# Patient Record
Sex: MALE | Race: WHITE | Employment: OTHER | URBAN - METROPOLITAN AREA
[De-identification: names, ages, dates, MRNs, and addresses within clinical notes are randomized per-mention and may not be internally consistent; named-entity substitution may affect disease eponyms.]

---

## 2017-03-10 RX ORDER — CELECOXIB 200 MG/1
CAPSULE ORAL
Qty: 90 CAP | Refills: 0 | Status: SHIPPED | OUTPATIENT
Start: 2017-03-10 | End: 2017-07-15 | Stop reason: SDUPTHER

## 2017-03-16 ENCOUNTER — TELEPHONE (OUTPATIENT)
Dept: INTERNAL MEDICINE CLINIC | Age: 72
End: 2017-03-16

## 2017-03-19 RX ORDER — HYDROCHLOROTHIAZIDE 25 MG/1
TABLET ORAL
Qty: 90 TAB | Refills: 1 | Status: SHIPPED | OUTPATIENT
Start: 2017-03-19 | End: 2017-12-03 | Stop reason: SDUPTHER

## 2017-07-16 RX ORDER — CELECOXIB 200 MG/1
CAPSULE ORAL
Qty: 90 CAP | Refills: 0 | Status: SHIPPED | OUTPATIENT
Start: 2017-07-16 | End: 2017-10-11 | Stop reason: SDUPTHER

## 2017-07-23 RX ORDER — ATORVASTATIN CALCIUM 10 MG/1
TABLET, FILM COATED ORAL
Qty: 90 TAB | Refills: 0 | Status: SHIPPED | OUTPATIENT
Start: 2017-07-23 | End: 2017-11-01 | Stop reason: SDUPTHER

## 2017-08-08 ENCOUNTER — OFFICE VISIT (OUTPATIENT)
Dept: INTERNAL MEDICINE CLINIC | Age: 72
End: 2017-08-08

## 2017-08-08 VITALS
HEIGHT: 73 IN | BODY MASS INDEX: 23.06 KG/M2 | HEART RATE: 76 BPM | WEIGHT: 174 LBS | TEMPERATURE: 98.6 F | SYSTOLIC BLOOD PRESSURE: 126 MMHG | OXYGEN SATURATION: 96 % | RESPIRATION RATE: 16 BRPM | DIASTOLIC BLOOD PRESSURE: 76 MMHG

## 2017-08-08 DIAGNOSIS — Z71.89 ACP (ADVANCE CARE PLANNING): ICD-10-CM

## 2017-08-08 DIAGNOSIS — Z96.652 H/O TOTAL KNEE REPLACEMENT, LEFT: ICD-10-CM

## 2017-08-08 DIAGNOSIS — E78.00 PURE HYPERCHOLESTEROLEMIA: ICD-10-CM

## 2017-08-08 DIAGNOSIS — Z13.39 SCREENING FOR ALCOHOLISM: ICD-10-CM

## 2017-08-08 DIAGNOSIS — M15.9 GENERALIZED OSTEOARTHRITIS OF MULTIPLE SITES: ICD-10-CM

## 2017-08-08 DIAGNOSIS — R73.01 IMPAIRED FASTING GLUCOSE: ICD-10-CM

## 2017-08-08 DIAGNOSIS — K64.9 HEMORRHOIDS, UNSPECIFIED HEMORRHOID TYPE: ICD-10-CM

## 2017-08-08 DIAGNOSIS — Z00.00 MEDICARE ANNUAL WELLNESS VISIT, SUBSEQUENT: Primary | ICD-10-CM

## 2017-08-08 DIAGNOSIS — I10 HYPERTENSION, ESSENTIAL: ICD-10-CM

## 2017-08-08 DIAGNOSIS — Z23 ENCOUNTER FOR IMMUNIZATION: ICD-10-CM

## 2017-08-08 RX ORDER — OXYCODONE AND ACETAMINOPHEN 5; 325 MG/1; MG/1
1 TABLET ORAL
Qty: 30 TAB | Refills: 0 | Status: SHIPPED | OUTPATIENT
Start: 2017-08-08 | End: 2018-08-10 | Stop reason: SDUPTHER

## 2017-08-08 RX ORDER — GUAIFENESIN 100 MG/5ML
81 LIQUID (ML) ORAL DAILY
COMMUNITY
End: 2019-08-20 | Stop reason: ALTCHOICE

## 2017-08-08 RX ORDER — AMOXICILLIN 500 MG/1
2000 CAPSULE ORAL ONCE
Qty: 4 CAP | Refills: 3 | Status: SHIPPED | OUTPATIENT
Start: 2017-08-08 | End: 2017-08-08

## 2017-08-08 NOTE — ACP (ADVANCE CARE PLANNING)
Advance Care Planning (ACP) Provider Note - Comprehensive     Date of ACP Conversation: 08/08/17  Persons included in Conversation:  patient      Authorized Decision Maker (if patient is incapable of making informed decisions): This person is:  Healthcare Agent/Medical Power of  under Advance Directive        General ACP for ALL Patients with Decision Making Capacity:  Importance of advance care planning, including choosing a healthcare agent to communicate patient's healthcare decisions if patient lost the ability to make decisions, such as after a sudden illness or accident  Understanding of the healthcare agent role was assessed and information provided  Opportunity offered to explore how cultural, Judaism, spiritual, or personal beliefs would affect decisions for future care  Exploration of values, goals, and preferences if recovery is not expected, even with continued medical treatment in the event of: Imminent death or severe, permanent brain injury     For Serious or Chronic Illness:  His medical problems were reviewed with them. Understanding of CPR, goals and expected outcomes, benefits and burdens discussed. Information on CPR success rates provided (e.g. for CPR in hospital, survival to d/c at two weeks is 22%, for chronically ill or elderly/frail survival is less than 3%); Individual asked to communicate understanding of information in his/her own words. Understanding of medical conditions    Interventions Provided:  Reviewed existing Advance Directive   Recommended communicating the plan and making copies for the healthcare agent, personal physician, and others as appropriate (e.g., health system)  Recommended review of completed ACP document annually or upon change in health status      He has an advanced directive - a copy has been provided & still reflects him wishes. Reviewed DNR/DNI and patient is not interested.

## 2017-08-08 NOTE — PATIENT INSTRUCTIONS
Medicare Wellness Visit, Male    The best way to live healthy is to have a healthy lifestyle by eating a well-balanced diet, exercising regularly, limiting alcohol and stopping smoking. Regular physical exams and screening tests are another way to keep healthy. Preventive exams provided by your health care provider can find health problems before they become diseases or illnesses. Preventive services including immunizations, screening tests, monitoring and exams can help you take care of your own health. All people over age 72 should have a pneumovax  and and a prevnar shot to prevent pneumonia. These are once in a lifetime unless you and your provider decide differently. All people over 65 should have a yearly flu shot and a tetanus vaccine every 10 years. Screening for diabetes mellitus with a blood sugar test should be done every year. Glaucoma is a disease of the eye due to increased ocular pressure that can lead to blindness and it should be done every year by an eye professional.    Cardiovascular screening tests that check for elevated lipids (fatty part of blood) which can lead to heart disease and strokes should be done every 5 years. Colorectal screening that evaluates for blood or polyps in your colon should be done yearly as a stool test or every five years as a flexible sigmoidoscope or every 10 years as a colonoscopy up to age 76. Men up to age 76 may need a screening blood test for prostate cancer at certain intervals, depending on their personal and family history. This decision is between the patient and his provider. If you have been a smoker or had family history of abdominal aortic aneurysms, you and your provider may decide to schedule an ultrasound test of your aorta. Hepatitis C screening is also recommended for anyone born between [birth year] through [address]. A shingles vaccine is also recommended once in a lifetime after age 61.     Your Medicare Wellness Exam is recommended annually. Here is a list of your current Health Maintenance items with a due date:  Health Maintenance Due   Topic Date Due    DTaP/Tdap/Td  (1 - Tdap) 02/15/1966    Shingles Vaccine  12/15/2004    Pneumococcal Vaccine (2 of 2 - PCV13) 11/05/2015    Colonoscopy  01/01/2016    Glaucoma Screening   10/21/2016    Annual Well Visit  12/11/2016    Flu Vaccine  08/01/2017            Advance Directives: Care Instructions  Your Care Instructions  An advance directive is a legal way to state your wishes at the end of your life. It tells your family and your doctor what to do if you can no longer say what you want. There are two main types of advance directives. You can change them any time that your wishes change. · A living will tells your family and your doctor your wishes about life support and other treatment. · A durable power of  for health care lets you name a person to make treatment decisions for you when you can't speak for yourself. This person is called a health care agent. If you do not have an advance directive, decisions about your medical care may be made by a doctor or a  who doesn't know you. It may help to think of an advance directive as a gift to the people who care for you. If you have one, they won't have to make tough decisions by themselves. Follow-up care is a key part of your treatment and safety. Be sure to make and go to all appointments, and call your doctor if you are having problems. It's also a good idea to know your test results and keep a list of the medicines you take. How can you care for yourself at home? · Discuss your wishes with your loved ones and your doctor. This way, there are no surprises. · Many states have a unique form. Or you might use a universal form that has been approved by many states. This kind of form can sometimes be completed and stored online.  Your electronic copy will then be available wherever you have a connection to the Internet. In most cases, doctors will respect your wishes even if you have a form from a different state. · You don't need a  to do an advance directive. But you may want to get legal advice. · Think about these questions when you prepare an advance directive:  ¨ Who do you want to make decisions about your medical care if you are not able to? Many people choose a family member or close friend. ¨ Do you know enough about life support methods that might be used? If not, talk to your doctor so you understand. ¨ What are you most afraid of that might happen? You might be afraid of having pain, losing your independence, or being kept alive by machines. ¨ Where would you prefer to die? Choices include your home, a hospital, or a nursing home. ¨ Would you like to have information about hospice care to support you and your family? ¨ Do you want to donate organs when you die? ¨ Do you want certain Denominational practices performed before you die? If so, put your wishes in the advance directive. · Read your advance directive every year, and make changes as needed. When should you call for help? Be sure to contact your doctor if you have any questions. Where can you learn more? Go to http://wally-payal.info/. Enter R264 in the search box to learn more about \"Advance Directives: Care Instructions. \"  Current as of: November 17, 2016  Content Version: 11.3  © 9476-3116 [employer]. Care instructions adapted under license by [employer] (which disclaims liability or warranty for this information). If you have questions about a medical condition or this instruction, always ask your healthcare professional. [address] any warranty or liability for your use of this information.

## 2017-08-08 NOTE — PROGRESS NOTES
Medicare wellness. Has a cyst on buttocks he would like you to look at. Requesting refill on Amoxicillin for dental procedures to have on hand.  Colonoscopy done at [facility], will request.

## 2017-08-08 NOTE — PROGRESS NOTES
Alex Tiwari is a 67 y.o. male who was seen in clinic today (8/8/2017) for a full physical.      Assessment & Plan:   Diagnoses and all orders for this visit:    1. Medicare annual wellness visit, subsequent    2. ACP (advance care planning)    3. Encounter for immunization  -     pneumococcal 13 kyle conj dip (PREVNAR-13) 0.5 mL syrg injection; 0.5 mL by IntraMUSCular route once for 1 dose.  -     diph,Pertuss,Acell,,Tet Vac-PF (ADACEL) 2 Lf-(2.5-5-3-5 mcg)-5Lf/0.5 mL susp; 0.5 mL by IntraMUSCular route once for 1 dose.  -     varicella zoster vacine live (VARICELLA-ZOSTER VACINE LIVE) 19,400 unit/0.65 mL susr injection; 1 Vial by SubCUTAneous route once for 1 dose. 4. Screening for alcoholism    5. Hypertension, essential- well controlled, continue current treatment pending review of labs   -     METABOLIC PANEL, COMPREHENSIVE  -     CBC W/O DIFF    6. Pure hypercholesterolemia- at goal, continue current treatment pending review of labs   -     METABOLIC PANEL, COMPREHENSIVE  -     LIPID PANEL    7. Impaired fasting glucose- due for labs, watch diet  -     METABOLIC PANEL, COMPREHENSIVE  -     HEMOGLOBIN A1C WITH EAG    8. Generalized osteoarthritis of multiple sites- this is a chronic problem that is stable. Per review of available records and patients , there are no sign of overuse, misuse, diversion, or concerning side effects. Today we reviewed: the risk of overdose, addiction, and dependency proper storage and disposal of medications the goals of treatment (improve functionality, quality of life, and pain) alternative treatment options including non-narcotic modalities the risks and benefits of continuing with a narcotic based pain regimen. Naloxone prescription is not indicated. The following changes were made to the treatment plan: nothing - medications refilled. [facility]  reviewed. Will defer UDS due to infrequent use & refills.   Will defer pain contact at this time, but will monitor usage closely via . -     oxyCODONE-acetaminophen (PERCOCET) 5-325 mg per tablet; Take 1 Tab by mouth daily as needed for Pain. 9. H/O total knee replacement, left  -     amoxicillin (AMOXIL) 500 mg capsule; Take 4 Caps by mouth once for 1 dose. 4 capsules prior to dermatology or dental appointments    10. Hemorrhoids, unspecified hemorrhoid type- declined exam, wants to talk to specialist, info provided. -     REFERRAL TO COLON AND RECTAL SURGERY         Follow-up Disposition:  Return in about 1 year (around 8/8/2018) for FULL PHYSICAL - 30 minutes. ------------------------------------------------------------------------------------------    Subjective: Annual Wellness Visit- Subsequent Visit    End of Life Planning: This was discussed with him today and he has an advanced directive - a copy has been provided. Reviewed DNR/DNI and patient is not interested. Depression Screen:   PHQ over the last two weeks 8/8/2017   Little interest or pleasure in doing things Not at all   Feeling down, depressed or hopeless Not at all   Total Score PHQ 2 0       Fall Risk:   Fall Risk Assessment, last 12 mths 8/8/2017   Able to walk? Yes   Fall in past 12 months? No       Abuse Screen:  Abuse Screening Questionnaire 8/8/2017   Do you ever feel afraid of your partner? N   Are you in a relationship with someone who physically or mentally threatens you? N   Is it safe for you to go home? Y       Alcohol Risk Factor Screening: On any occasion during the past 3 months, have you had more than 4 drinks containing alcohol? No  Do you average more than 14 drinks per week? No    Hearing Loss:  wears hearing aides    Activities of Daily Living:  Self-care. Requires assistance with: no ADLs  Exercise: very active    Cognition Screen:  appropriate for age attention/concentration and appropriate safety awareness    Adult Nutrition Screen:  No risk factors noted.   He has lost some weight due to changing his eating habits      Health Maintenance  Daily Aspirin: yes  AAA Screening: n/a (IPPE only)  Glaucoma Screening: Records requested      Immunizations:     Influenza: he will consider. Tetanus: not UTD- script provided. Shingles: not UTD - script provided. Pneumonia: due for Prevnar & script provided. Cancer screening:    Prostate: n/a, reviewed guidelines. Colon: UTD. No record available in the chart. Patient Care Team:  Xenia Agustin MD as PCP - General (Internal Medicine)  Mary Perez DO (Dermatology)  Tracy Mcdonald MD (Orthopedic Surgery)       In addition to the above issues we also reviewed the following acute and/or chronic problems:    Cardiovascular Review  The patient has hypertension and hyperlipidemia. Since last visit: no changes. He reports taking medications as instructed, no medication side effects noted, patient does not perform home BP monitoring. Diet and Lifestyle: generally follows a low fat low cholesterol diet, generally follows a low sodium diet, exercises regularly. Labs: reviewed and discussed with patient. Lab Results   Component Value Date/Time    Sodium 141 07/29/2016 03:20 AM    Potassium 3.6 07/29/2016 03:20 AM    Cholesterol, total 166 03/18/2016 10:50 AM    LDL, calculated 92 03/18/2016 10:50 AM    Triglyceride 106 03/18/2016 10:50 AM    INR 1.1 07/22/2016 09:24 AM        Chronic Pain  He presents do to generalized osteoarthritis that is effecting multiple joints. He describes the pain as aching. It is intermittent. He denies weakness, denies numbness, denies paresthesias. Exacerbating factors identifiable by patient are overuse. He has tried the following: NSAIDs and Oxycodone. These have been: effective. He is using Celebrex daily and narcotic prn (only at night).  reviewed: yes        The following sections were reviewed & updated as appropriate: PMH, PSH, FH, and SH.       Patient Active Problem List   Diagnosis Code    Hypertension, essential I10    Hyperlipidemia E78.5    Skin cancer C44.90    Pre-diabetes R73.03    Chronic lower back pain M54.5, G89.29    ACP (advance care planning) Z71.89    RBBB (right bundle branch block) I45.10    Mechanical loosening of internal left knee prosthetic joint ([employer] [facility] [address] T84.033A          Prior to Admission medications    Medication Sig Start Date End Date Taking? Authorizing Provider   aspirin 81 mg chewable tablet Take 81 mg by mouth daily. Yes Historical Provider   atorvastatin (LIPITOR) 10 mg tablet TAKE 1 TABLET BY MOUTH DAILY. 7/23/17  Yes Collette Beverage, MD   celecoxib (CELEBREX) 200 mg capsule TAKE 1 CAPSULE BY MOUTH DAILY 7/16/17  Yes Collette Beverage, MD   hydroCHLOROthiazide (HYDRODIURIL) 25 mg tablet TAKE 1 TABLET BY MOUTH EVERY DAY 3/19/17  Yes Collette Beverage, MD   amoxicillin (AMOXIL) 500 mg capsule Take 2,000 mg by mouth once. 4 capsules prior to dermatology or dental appointments 7/8/15  Yes Historical Provider   multivitamin (ONE A DAY) tablet Take 1 Tab by mouth daily. Yes Historical Provider          Allergies   Allergen Reactions    Acetaminophen Other (comments)     UNABLE TO URINATE, CAUSES FLANK PAIN              Review of Systems   Constitutional: Negative for chills and fever. Respiratory: Negative for cough and shortness of breath. Cardiovascular: Negative for chest pain and palpitations. Gastrointestinal: Negative for abdominal pain, blood in stool, constipation, diarrhea, heartburn, nausea and vomiting. Genitourinary:        He reports: nocturia x 3. He denies: urinary hesitancy, urinary frequency, weak stream.   Musculoskeletal: Positive for back pain (L sided lower back, only at night, sharp, no trauma, no radiation) and joint pain. Negative for myalgias. Neurological: Negative for tingling, focal weakness and headaches. Endo/Heme/Allergies: Does not bruise/bleed easily. Psychiatric/Behavioral: Negative for depression.  The patient is not nervous/anxious and does not have insomnia. Objective:   Physical Exam   Constitutional: No distress. HENT:   Mouth/Throat: Mucous membranes are normal.   Hearing aides bilaterally   Eyes: Conjunctivae are normal. No scleral icterus. Neck: Neck supple. Cardiovascular: Regular rhythm and normal heart sounds. No murmur heard. Pulmonary/Chest: Effort normal and breath sounds normal. He has no wheezes. He has no rales. Abdominal: Soft. Bowel sounds are normal. He exhibits no mass. There is no hepatosplenomegaly. There is no tenderness. Musculoskeletal: He exhibits no edema. Lumbar back: He exhibits normal range of motion, no tenderness, no bony tenderness, no pain and no spasm. Lymphadenopathy:     He has no cervical adenopathy. Skin: No rash noted. Psychiatric: He has a normal mood and affect. His behavior is normal.          Visit Vitals    /76    Pulse 76    Temp 98.6 °F (37 °C) (Oral)    Resp 16    Ht 6' 0.6\" (1.844 m)    Wt 174 lb (78.9 kg)    SpO2 96%    BMI 23.21 kg/m2          Advised him to call back or return to office if symptoms worsen/change/persist.  Discussed expected course/resolution/complications of diagnosis in detail with patient. Medication risks/benefits/costs/interactions/alternatives discussed with patient. He was given an after visit summary which includes diagnoses, current medications, & vitals. He expressed understanding with the diagnosis and plan.         Levander Canavan, MD

## 2017-08-08 NOTE — MR AVS SNAPSHOT
Visit Information Date & Time Provider Department Dept. Phone Encounter #  
 8/8/2017  9:00 AM Johnathan Wilson, [address] [facility] Internal Medicine [phone] [id number] Follow-up Instructions Return in about 1 year (around 8/8/2018) for FULL PHYSICAL - 30 minutes. Upcoming Health Maintenance Date Due DTaP/Tdap/Td series (1 - Tdap) 2/15/1966 ZOSTER VACCINE AGE 60> 12/15/2004 Pneumococcal 65+ High/Highest Risk (2 of 2 - PCV13) 11/5/2015 COLONOSCOPY 1/1/2016 GLAUCOMA SCREENING Q2Y 10/21/2016 MEDICARE YEARLY EXAM 12/11/2016 INFLUENZA AGE 9 TO ADULT 8/1/2017 Allergies as of 8/8/2017  Review Complete On: 8/8/2017 By: Ang Bonilla RN Severity Noted Reaction Type Reactions Acetaminophen  07/22/2016    Other (comments) UNABLE TO URINATE, CAUSES FLANK PAIN Current Immunizations  Reviewed on 8/8/2017 Name Date Pneumococcal Polysaccharide (PPSV-23) 11/5/2014 Reviewed by Ang Bonilla RN on 8/8/2017 at  9:02 AM  
You Were Diagnosed With   
  
 Codes Comments Medicare annual wellness visit, subsequent    -  Primary ICD-10-CM: Z00.00 ICD-9-CM: V70.0 ACP (advance care planning)     ICD-10-CM: Z71.89 ICD-9-CM: V65.49 Encounter for immunization     ICD-10-CM: I67 ICD-9-CM: V03.89 Screening for alcoholism     ICD-10-CM: Z13.89 ICD-9-CM: V79.1 Hypertension, essential     ICD-10-CM: I10 
ICD-9-CM: 401.9 Pure hypercholesterolemia     ICD-10-CM: E78.00 ICD-9-CM: 272.0 Impaired fasting glucose     ICD-10-CM: R73.01 
ICD-9-CM: 790.21 Generalized osteoarthritis of multiple sites     ICD-10-CM: M15.9 ICD-9-CM: 715.09   
 H/O total knee replacement, left     ICD-10-CM: H30.699 ICD-9-CM: V43.65 Hemorrhoids, unspecified hemorrhoid type     ICD-10-CM: K64.9 ICD-9-CM: 315. 6 Vitals BP Pulse Temp Resp Height(growth percentile) Weight(growth percentile) 126/76 76 98.6 °F (37 °C) (Oral) 16 6' 0.6\" (1.844 m) 174 lb (78.9 kg) SpO2 BMI Smoking Status 96% 23.21 kg/m2 Former Smoker BMI and BSA Data Body Mass Index Body Surface Area  
 23.21 kg/m 2 2.01 m 2 Preferred Pharmacy Pharmacy Name Phone [facility]/PHARMACY #[id number] [id number] [facility] [phone] Your Updated Medication List  
  
   
This list is accurate as of: 17  9:47 AM.  Always use your most recent med list.  
  
  
  
  
 amoxicillin 500 mg capsule Commonly known as:  AMOXIL Take 4 Caps by mouth once for 1 dose. 4 capsules prior to dermatology or dental appointments  
  
 aspirin 81 mg chewable tablet Take 81 mg by mouth daily. atorvastatin 10 mg tablet Commonly known as:  LIPITOR  
TAKE 1 TABLET BY MOUTH DAILY. celecoxib 200 mg capsule Commonly known as:  CELEBREX  
TAKE 1 CAPSULE BY MOUTH DAILY  
  
 diph,Pertuss(Acell),Tet Vac-PF 2 Lf-(2.5-5-3-5 mcg)-5Lf/0.5 mL susp Commonly known as:  ADACEL  
0.5 mL by IntraMUSCular route once for 1 dose.  
  
 hydroCHLOROthiazide 25 mg tablet Commonly known as:  HYDRODIURIL  
TAKE 1 TABLET BY MOUTH EVERY DAY  
  
 multivitamin tablet Commonly known as:  ONE A DAY Take 1 Tab by mouth daily. oxyCODONE-acetaminophen 5-325 mg per tablet Commonly known as:  PERCOCET Take 1 Tab by mouth daily as needed for Pain. pneumococcal 13 kyle conj dip 0.5 mL Syrg injection Commonly known as:  PREVNAR-13  
0.5 mL by IntraMUSCular route once for 1 dose. varicella zoster vacine live 19,400 unit/0.65 mL Susr injection Commonly known as:  varicella-zoster vacine live 1 Vial by SubCUTAneous route once for 1 dose. Prescriptions Printed Refills  
 pneumococcal 13 kyle conj dip (PREVNAR-13) 0.5 mL syrg injection 0 Si.5 mL by IntraMUSCular route once for 1 dose. Class: Print Route: IntraMUSCular diph,Pertuss,Acell,,Tet Vac-PF (ADACEL) 2 Lf-(2.5-5-3-5 mcg)-5Lf/0.5 mL susp 0 Si.5 mL by IntraMUSCular route once for 1 dose. Class: Print Route: IntraMUSCular  
 varicella zoster vacine live (VARICELLA-ZOSTER VACINE LIVE) 19,400 unit/0.65 mL susr injection 0 Si Vial by SubCUTAneous route once for 1 dose. Class: Print Route: SubCUTAneous  
 oxyCODONE-acetaminophen (PERCOCET) 5-325 mg per tablet 0 Sig: Take 1 Tab by mouth daily as needed for Pain. Class: Print Route: Oral  
  
Prescriptions Sent to Pharmacy Refills  
 amoxicillin (AMOXIL) 500 mg capsule 3 Sig: Take 4 Caps by mouth once for 1 dose. 4 capsules prior to dermatology or dental appointments Class: Normal  
 Pharmacy: [facility]/pharmacy #[id number], [address] [place] [address] Ph #: [phone] Route: Oral  
  
We Performed the Following CBC W/O DIFF [35137 CPT(R)] HEMOGLOBIN A1C WITH EAG [99549 CPT(R)] LIPID PANEL [15498 CPT(R)] METABOLIC PANEL, COMPREHENSIVE [60122 CPT(R)] REFERRAL TO COLON AND RECTAL SURGERY [REF17 Custom] Follow-up Instructions Return in about 1 year (around 2018) for FULL PHYSICAL - 30 minutes. Referral Information Referral ID Referred By Referred To  
  
 [id number] [facility] [address] Visits Status Start Date End Date 1 New Request 17 If your referral has a status of pending review or denied, additional information will be sent to support the outcome of this decision. Patient Instructions Medicare Wellness Visit, Male The best way to live healthy is to have a healthy lifestyle by eating a well-balanced diet, exercising regularly, limiting alcohol and stopping smoking. Regular physical exams and screening tests are another way to keep healthy. Preventive exams provided by your health care provider can find health problems before they become diseases or illnesses. Preventive services including immunizations, screening tests, monitoring and exams can help you take care of your own health. All people over age 72 should have a pneumovax  and and a prevnar shot to prevent pneumonia. These are once in a lifetime unless you and your provider decide differently. All people over 65 should have a yearly flu shot and a tetanus vaccine every 10 years. Screening for diabetes mellitus with a blood sugar test should be done every year. Glaucoma is a disease of the eye due to increased ocular pressure that can lead to blindness and it should be done every year by an eye professional. 
 
Cardiovascular screening tests that check for elevated lipids (fatty part of blood) which can lead to heart disease and strokes should be done every 5 years. Colorectal screening that evaluates for blood or polyps in your colon should be done yearly as a stool test or every five years as a flexible sigmoidoscope or every 10 years as a colonoscopy up to age 76. Men up to age 76 may need a screening blood test for prostate cancer at certain intervals, depending on their personal and family history. This decision is between the patient and his provider. If you have been a smoker or had family history of abdominal aortic aneurysms, you and your provider may decide to schedule an ultrasound test of your aorta. Hepatitis C screening is also recommended for anyone born between [birth year] through [address]. A shingles vaccine is also recommended once in a lifetime after age 61. Your Medicare Wellness Exam is recommended annually. Here is a list of your current Health Maintenance items with a due date: 
Health Maintenance Due Topic Date Due  
 DTaP/Tdap/Td  (1 - Tdap) 02/15/1966  Shingles Vaccine  12/15/2004  Pneumococcal Vaccine (2 of 2 - PCV13) 11/05/2015  Colonoscopy  01/01/2016  Glaucoma Screening   10/21/2016 [address] [place] [address] Annual Well Visit  12/11/2016  Flu Vaccine  08/01/2017 Advance Directives: Care Instructions Your Care Instructions An advance directive is a legal way to state your wishes at the end of your life. It tells your family and your doctor what to do if you can no longer say what you want. There are two main types of advance directives. You can change them any time that your wishes change. · A living will tells your family and your doctor your wishes about life support and other treatment. · A durable power of  for health care lets you name a person to make treatment decisions for you when you can't speak for yourself. This person is called a health care agent. If you do not have an advance directive, decisions about your medical care may be made by a doctor or a  who doesn't know you. It may help to think of an advance directive as a gift to the people who care for you. If you have one, they won't have to make tough decisions by themselves. Follow-up care is a key part of your treatment and safety. Be sure to make and go to all appointments, and call your doctor if you are having problems. It's also a good idea to know your test results and keep a list of the medicines you take. How can you care for yourself at home? · Discuss your wishes with your loved ones and your doctor. This way, there are no surprises. · Many states have a unique form. Or you might use a universal form that has been approved by many states. This kind of form can sometimes be completed and stored online. Your electronic copy will then be available wherever you have a connection to the Internet. In most cases, doctors will respect your wishes even if you have a form from a different state. · You don't need a  to do an advance directive. But you may want to get legal advice. · Think about these questions when you prepare an advance directive: ¨ Who do you want to make decisions about your medical care if you are not able to? Many people choose a family member or close friend. ¨ Do you know enough about life support methods that might be used? If not, talk to your doctor so you understand. ¨ What are you most afraid of that might happen? You might be afraid of having pain, losing your independence, or being kept alive by machines. ¨ Where would you prefer to die? Choices include your home, a hospital, or a nursing home. ¨ Would you like to have information about hospice care to support you and your family? ¨ Do you want to donate organs when you die? ¨ Do you want certain Quaker practices performed before you die? If so, put your wishes in the advance directive. · Read your advance directive every year, and make changes as needed. When should you call for help? Be sure to contact your doctor if you have any questions. Where can you learn more? Go to http://wally-payal.info/. Enter R264 in the search box to learn more about \"Advance Directives: Care Instructions. \" Current as of: November 17, 2016 Content Version: 11.3 © 4373-2717 [employer]. Care instructions adapted under license by [employer] (which disclaims liability or warranty for this information). If you have questions about a medical condition or this instruction, always ask your healthcare professional. [place] [address] any warranty or liability for your use of this information. Introducing [facility]! Dear Daniela Su: 
Thank you for requesting a Swipely account. Our records indicate that you already have an active Swipely account. You can access your account anytime at [email or web address] Did you know that you can access your hospital and ER discharge instructions at any time in WhenSoon? You can also review all of your test results from your hospital stay or ER visit. Additional Information If you have questions, please visit the Frequently Asked Questions section of the WhenSoon website at [email or web address] Remember, WhenSoon is NOT to be used for urgent needs. For medical emergencies, dial 911. Now available from your iPhone and Android! Please provide this summary of care documentation to your next provider. Your primary care clinician is listed as Xenia Agustin. If you have any questions after today's visit, please call [phone].

## 2017-08-18 ENCOUNTER — HOSPITAL ENCOUNTER (OUTPATIENT)
Dept: LAB | Age: 72
Discharge: HOME | End: 2017-08-18
Payer: MEDICARE

## 2017-08-18 PROCEDURE — 36415 COLL VENOUS BLD VENIPUNCTURE: CPT

## 2017-08-18 PROCEDURE — 80061 LIPID PANEL: CPT

## 2017-08-18 PROCEDURE — 85027 COMPLETE CBC AUTOMATED: CPT

## 2017-08-18 PROCEDURE — 80053 COMPREHEN METABOLIC PANEL: CPT

## 2017-08-18 PROCEDURE — 83036 HEMOGLOBIN GLYCOSYLATED A1C: CPT

## 2017-08-19 LAB
ALBUMIN SERPL-MCNC: 4.3 G/DL (ref 3.5–4.8)
ALBUMIN/GLOB SERPL: 1.7 {RATIO} (ref 1.2–2.2)
ALP SERPL-CCNC: 77 IU/L (ref 39–117)
ALT SERPL-CCNC: 13 IU/L (ref 0–44)
AST SERPL-CCNC: 19 IU/L (ref 0–40)
BILIRUB SERPL-MCNC: 0.7 MG/DL (ref 0–1.2)
BUN SERPL-MCNC: 20 MG/DL (ref 8–27)
BUN/CREAT SERPL: 20 (ref 10–24)
CALCIUM SERPL-MCNC: 9.4 MG/DL (ref 8.6–10.2)
CHLORIDE SERPL-SCNC: 99 MMOL/L (ref 96–106)
CHOLEST SERPL-MCNC: 179 MG/DL (ref 100–199)
CO2 SERPL-SCNC: 26 MMOL/L (ref 18–29)
CREAT SERPL-MCNC: 1 MG/DL (ref 0.76–1.27)
ERYTHROCYTE [DISTWIDTH] IN BLOOD BY AUTOMATED COUNT: 13.8 % (ref 12.3–15.4)
EST. AVERAGE GLUCOSE BLD GHB EST-MCNC: 126 MG/DL
GLOBULIN SER CALC-MCNC: 2.5 G/DL (ref 1.5–4.5)
GLUCOSE SERPL-MCNC: 106 MG/DL (ref 65–99)
HBA1C MFR BLD: 6 % (ref 4.8–5.6)
HCT VFR BLD AUTO: 46.4 % (ref 37.5–51)
HDLC SERPL-MCNC: 58 MG/DL
HGB BLD-MCNC: 15.8 G/DL (ref 12.6–17.7)
LDLC SERPL CALC-MCNC: 94 MG/DL (ref 0–99)
MCH RBC QN AUTO: 32 PG (ref 26.6–33)
MCHC RBC AUTO-ENTMCNC: 34.1 G/DL (ref 31.5–35.7)
MCV RBC AUTO: 94 FL (ref 79–97)
PLATELET # BLD AUTO: 258 X10E3/UL (ref 150–379)
POTASSIUM SERPL-SCNC: 3.7 MMOL/L (ref 3.5–5.2)
PROT SERPL-MCNC: 6.8 G/DL (ref 6–8.5)
RBC # BLD AUTO: 4.94 X10E6/UL (ref 4.14–5.8)
SODIUM SERPL-SCNC: 143 MMOL/L (ref 134–144)
TRIGL SERPL-MCNC: 134 MG/DL (ref 0–149)
VLDLC SERPL CALC-MCNC: 27 MG/DL (ref 5–40)
WBC # BLD AUTO: 8.5 X10E3/UL (ref 3.4–10.8)

## 2017-10-11 RX ORDER — CELECOXIB 200 MG/1
CAPSULE ORAL
Qty: 90 CAP | Refills: 2 | Status: SHIPPED | OUTPATIENT
Start: 2017-10-11 | End: 2018-07-22 | Stop reason: SDUPTHER

## 2017-11-01 RX ORDER — ATORVASTATIN CALCIUM 10 MG/1
TABLET, FILM COATED ORAL
Qty: 90 TAB | Refills: 3 | Status: SHIPPED | OUTPATIENT
Start: 2017-11-01 | End: 2018-10-18 | Stop reason: SDUPTHER

## 2017-12-03 RX ORDER — HYDROCHLOROTHIAZIDE 25 MG/1
TABLET ORAL
Qty: 90 TAB | Refills: 2 | Status: SHIPPED | OUTPATIENT
Start: 2017-12-03 | End: 2018-08-29 | Stop reason: SDUPTHER

## 2018-04-12 ENCOUNTER — TELEPHONE (OUTPATIENT)
Dept: INTERNAL MEDICINE CLINIC | Age: 73
End: 2018-04-12

## 2018-04-12 NOTE — TELEPHONE ENCOUNTER
Verified patient identity with two identifiers. Spoke with patient by phone, states this was given last fall at [facility].  Verified patient identity with two identifiers. Spoke with [facility] and verified date of TDAP immunization. Chart updated.

## 2018-07-13 ENCOUNTER — TELEPHONE (OUTPATIENT)
Dept: INTERNAL MEDICINE CLINIC | Age: 73
End: 2018-07-13

## 2018-07-13 NOTE — TELEPHONE ENCOUNTER
Pt's dentist called. Noticed on exam significant changes with bite in the last year. Nothing pathological at this time, but he did bring up the possibility of Padget. Pt has regular f/u next month and will address at that time.

## 2018-07-22 RX ORDER — CELECOXIB 200 MG/1
CAPSULE ORAL
Qty: 90 CAP | Refills: 1 | Status: SHIPPED | OUTPATIENT
Start: 2018-07-22 | End: 2019-01-15 | Stop reason: SDUPTHER

## 2018-08-10 ENCOUNTER — OFFICE VISIT (OUTPATIENT)
Dept: INTERNAL MEDICINE CLINIC | Age: 73
End: 2018-08-10

## 2018-08-10 VITALS
BODY MASS INDEX: 22.35 KG/M2 | HEART RATE: 76 BPM | WEIGHT: 168.6 LBS | TEMPERATURE: 98.3 F | OXYGEN SATURATION: 96 % | RESPIRATION RATE: 16 BRPM | HEIGHT: 73 IN | DIASTOLIC BLOOD PRESSURE: 78 MMHG | SYSTOLIC BLOOD PRESSURE: 108 MMHG

## 2018-08-10 DIAGNOSIS — Z13.39 SCREENING FOR ALCOHOLISM: ICD-10-CM

## 2018-08-10 DIAGNOSIS — Z13.31 SCREENING FOR DEPRESSION: ICD-10-CM

## 2018-08-10 DIAGNOSIS — Z23 ENCOUNTER FOR IMMUNIZATION: ICD-10-CM

## 2018-08-10 DIAGNOSIS — E78.00 PURE HYPERCHOLESTEROLEMIA: ICD-10-CM

## 2018-08-10 DIAGNOSIS — Z51.81 MEDICATION MONITORING ENCOUNTER: ICD-10-CM

## 2018-08-10 DIAGNOSIS — M15.9 PRIMARY OSTEOARTHRITIS INVOLVING MULTIPLE JOINTS: ICD-10-CM

## 2018-08-10 DIAGNOSIS — Z00.00 MEDICARE ANNUAL WELLNESS VISIT, SUBSEQUENT: Primary | ICD-10-CM

## 2018-08-10 DIAGNOSIS — L98.9 SKIN LESION: ICD-10-CM

## 2018-08-10 DIAGNOSIS — R63.4 WEIGHT LOSS, NON-INTENTIONAL: ICD-10-CM

## 2018-08-10 DIAGNOSIS — I10 HYPERTENSION, ESSENTIAL: ICD-10-CM

## 2018-08-10 DIAGNOSIS — Z12.5 PROSTATE CANCER SCREENING: ICD-10-CM

## 2018-08-10 DIAGNOSIS — R35.1 BENIGN PROSTATIC HYPERPLASIA WITH NOCTURIA: ICD-10-CM

## 2018-08-10 DIAGNOSIS — N40.1 BENIGN PROSTATIC HYPERPLASIA WITH NOCTURIA: ICD-10-CM

## 2018-08-10 DIAGNOSIS — Z71.89 ACP (ADVANCE CARE PLANNING): ICD-10-CM

## 2018-08-10 DIAGNOSIS — R73.01 IMPAIRED FASTING GLUCOSE: ICD-10-CM

## 2018-08-10 RX ORDER — TAMSULOSIN HYDROCHLORIDE 0.4 MG/1
0.4 CAPSULE ORAL DAILY
Qty: 30 CAP | Refills: 1 | Status: SHIPPED | OUTPATIENT
Start: 2018-08-10 | End: 2018-10-01 | Stop reason: SDUPTHER

## 2018-08-10 RX ORDER — OXYCODONE AND ACETAMINOPHEN 5; 325 MG/1; MG/1
1 TABLET ORAL
Qty: 28 TAB | Refills: 0 | Status: SHIPPED | OUTPATIENT
Start: 2018-08-10 | End: 2019-08-20

## 2018-08-10 NOTE — PROGRESS NOTES
Shy Donaldson is a 68 y.o. male who was seen in clinic today (8/10/2018) for a full physical.        Assessment & Plan:   Diagnoses and all orders for this visit:    1. Medicare annual wellness visit, subsequent    2. Encounter for immunization  -     varicella-zoster recombinant, PF, (SHINGRIX, PF,) 50 mcg/0.5 mL susr injection; 0.5 ml IM once and then repeat in 2-6 months. 3. ACP (advance care planning)    4. Screening for alcoholism  -     Annual  Alcohol Screen 15 min ()    5. Screening for depression  -     Depression Screen Annual    6. Pure hypercholesterolemia- well controlled, continue current treatment pending review of labs   -     CBC WITH AUTOMATED DIFF  -     LIPID PANEL    7. Hypertension, essential- at goal, continue current treatment pending review of labs   -     CBC WITH AUTOMATED DIFF    8. Impaired fasting glucose- due for labs, diet has been poor due to weight loss so curious to see what labs are  -     HEMOGLOBIN A1C WITH EAG    9. Primary osteoarthritis involving multiple joints- this is a chronic problem that is stable. Per review of available records and patients , there are no sign of overuse, misuse, diversion, or concerning side effects. Today we reviewed: the risk of overdose, addiction, and dependency proper storage and disposal of medications the goals of treatment (improve functionality, quality of life, and pain) alternative treatment options including non-narcotic modalities the risks and benefits of continuing with a narcotic based pain regimen. The following changes were made to the treatment plan: nothing - medications refilled. [facility]  reviewed and pain contract signed. We had a sujata and good discussion about concerns regarding regular use of narcotics. We discussed increasing Celebrex from daily to BID, but he is having some GI issues that maybe attributed to NSAIDs so will not make any changes. He is using meds sparingly (2-3/month).    - oxyCODONE-acetaminophen (PERCOCET) 5-325 mg per tablet; Take 1 Tab by mouth every four (4) hours as needed for Pain. Max Daily Amount: 6 Tabs. -     COMPLIANCE DRUG SCREEN/PRESCRIPTION MONITORING    10. Medication monitoring encounter  -     COMPLIANCE DRUG SCREEN/PRESCRIPTION MONITORING    11. Skin lesion- new dx, is concerning for skin cancer, he has f/u with dermatologist scheduled    12. Weight loss, non-intentional- this is a new problem, symptoms are: fluctuating, differential dx reviewed with the patient, exact etiology is unclear at this time. Initially sounded like it was due to shelter and diet changes (not having a set schedule so not eating regularly), however he has really ramped up his calorie intake with only slight increase in weight. Reviewed w/u. Due to his new BPH symptoms prostate cancer is always a possibility.     -     CBC WITH AUTOMATED DIFF  -     METABOLIC PANEL, COMPREHENSIVE    13. Benign prostatic hyperplasia with nocturia- new dx, new of the last year, reviewed BPH vs prostate ca, he is asking about Urolift procedure, will start w/ trying meds below first, reviewed expectations, and reviewed when to consider surgery. Will hold off on urology referral but if medication do not help will have him see specialist (regardless of PSA). -     tamsulosin (FLOMAX) 0.4 mg capsule; Take 1 Cap by mouth daily. 14. Prostate cancer screening  -     PSA SCREENING (SCREENING)         Follow-up Disposition:  Return in about 1 year (around 8/10/2019), or if symptoms worsen or fail to improve.        ------------------------------------------------------------------------------------------    Subjective: Annual Wellness Visit- Subsequent Visit    End of Life Planning: This was discussed with him today and he has an advanced directive - a copy has been provided. Reviewed DNR/DNI and patient is not interested.       Depression Screen:   PHQ over the last two weeks 8/10/2018   Little interest or pleasure in doing things Not at all   Feeling down, depressed, irritable, or hopeless Not at all   Total Score PHQ 2 0       Fall Risk:   Fall Risk Assessment, last 12 mths 8/10/2018   Able to walk? Yes   Fall in past 12 months? No       Abuse Screen:  Abuse Screening Questionnaire 8/10/2018   Do you ever feel afraid of your partner? N   Are you in a relationship with someone who physically or mentally threatens you? N   Is it safe for you to go home? Y         Alcohol Risk Factor Screening: On any occasion during the past 3 months, have you had more than 4 drinks containing alcohol? No  Do you average more than 14 drinks per week? No    Hearing Loss: The patient wears hearing aids. Cognition Screen:  Has your family/caregiver stated any concerns about your memory: no    Activities of Daily Living:    Requires assistance with: no ADLs  Home contains: handrails and grab bars  Exercise: very active    Adult Nutrition Screen:  Unplanned weight loss (5 pts)      Health Maintenance  Daily Aspirin: yes  AAA Screening: n/a (IPPE only)  Glaucoma Screening: UTD      Immunizations:     Influenza: he will plan on getting it this fall. Tetanus: up to date. Shingles: due for Shingrix - script provided. Pneumonia: up to date. Cancer screening:    Prostate: reviewed guidelines, n/a. Colon: guidelines reviewed, UTD. Patient Care Team:  Yajaira Miranda MD as PCP - General (Internal Medicine)  Falguni Carmona DO (Dermatology)  Valdemar Wharton MD (Orthopedic Surgery)  Marivel Tanner MD (Optometry)       In addition to the above issues we also reviewed the following acute and/or chronic problems:    Cardiovascular Review  The patient has hypertension and hyperlipidemia. Since last visit: weight has decreased. He reports taking medications as instructed, no medication side effects noted, patient does not perform home BP monitoring.   Diet and Lifestyle: not attempting to follow a low fat, low cholesterol diet, not attempting to follow a low sodium diet, exercises regularly. Labs: reviewed and discussed with patient. Chronic Pain (> 3 months)  He RTC today to discuss his osteoarthritis that is affecting all joints. Significant changes since last visit: none. He reports his symptoms are stable. He is able to do his normal daily activities. He reports using Celebrex 1 tab daily w/ good relief. Least pain over the last week has been 0/10. Worst pain over the last week has been 7/10. He reports the following adverse side effects: none. Pill count is consistent with her prescription: did not bring in, he is out    Aberrant behaviors: none.  reviewed: yes  Concomitant use of a benzodiazepine: no  Personal or family history of psychiatric, addiction, or substance abuse: no  Urine Drug Screen: due - order placed. Pain agreement on file: not on file - completed today             The following sections were reviewed & updated as appropriate: PMH, PSH, FH, and SH. Patient Active Problem List   Diagnosis Code    Hypertension, essential I10    Hyperlipidemia E78.5    Skin cancer C44.90    Pre-diabetes R73.03    Chronic lower back pain M54.5, G89.29    RBBB (right bundle branch block) I45.10          Prior to Admission medications    Medication Sig Start Date End Date Taking? Authorizing Provider   celecoxib (CELEBREX) 200 mg capsule TAKE ONE CAPSULE BY MOUTH EVERY DAY 7/22/18  Yes Susan Flores MD   hydroCHLOROthiazide (HYDRODIURIL) 25 mg tablet TAKE 1 TABLET BY MOUTH EVERY DAY 12/3/17  Yes Susan Flores MD   atorvastatin (LIPITOR) 10 mg tablet TAKE 1 TABLET BY MOUTH EVERY DAY 11/1/17  Yes Susan Flores MD   aspirin 81 mg chewable tablet Take 81 mg by mouth daily. Yes Historical Provider   oxyCODONE-acetaminophen (PERCOCET) 5-325 mg per tablet Take 1 Tab by mouth daily as needed for Pain.  8/8/17  Yes Susan Flores MD   multivitamin (ONE A DAY) tablet Take 1 Tab by mouth daily. Yes Historical Provider          Allergies   Allergen Reactions    Acetaminophen Other (comments)     UNABLE TO URINATE, CAUSES FLANK PAIN              Review of Systems   Constitutional: Positive for weight loss (He reports retiring and weight decreased to the low 160's and has started to pay attention to diet and weight is up to 168, he has increased calorie intake signifcantly, no obvious reason for the decrease. , he reports no appetite). Negative for chills and fever. Respiratory: Negative for cough and shortness of breath. Cardiovascular: Negative for chest pain and palpitations. Gastrointestinal: Negative for abdominal pain, blood in stool, constipation, diarrhea, heartburn, nausea and vomiting. He reports increase in belching, chronic   Genitourinary:        He reports: nocturia x 3 (new in the last 1-2 yrs), urinary hesitancy, urinary frequency. He denies: weak stream.       Denies trouble getting or maintaining an erection. Denies trouble with AM erections. Musculoskeletal: Positive for joint pain. Negative for myalgias. Neurological: Negative for tingling, focal weakness and headaches. Endo/Heme/Allergies: Does not bruise/bleed easily. Psychiatric/Behavioral: Negative for depression. The patient is not nervous/anxious and does not have insomnia. Objective:   Physical Exam   Eyes: Conjunctivae are normal. No scleral icterus. Neck: No thyromegaly present. Cardiovascular: Regular rhythm and normal heart sounds. No murmur heard. Pulses:       Dorsalis pedis pulses are 2+ on the right side, and 2+ on the left side. Posterior tibial pulses are 2+ on the right side, and 2+ on the left side. Pulmonary/Chest: Effort normal and breath sounds normal. He has no wheezes. He has no rales. Abdominal: Soft. Bowel sounds are normal. He exhibits no mass. There is no hepatosplenomegaly. There is no tenderness. Musculoskeletal: He exhibits no edema. Lymphadenopathy:     He has no cervical adenopathy. Skin:   R shoulder there is a 1.5cm lesion, erythematous ring and raised brown/black   Psychiatric: He has a normal mood and affect. His behavior is normal.          Visit Vitals    /78    Pulse 76    Temp 98.3 °F (36.8 °C) (Oral)    Resp 16    Ht 6' 0.5\" (1.842 m)    Wt 168 lb 9.6 oz (76.5 kg)    SpO2 96%    BMI 22.55 kg/m2          Advised him to call back or return to office if symptoms worsen/change/persist.  Discussed expected course/resolution/complications of diagnosis in detail with patient. Medication risks/benefits/costs/interactions/alternatives discussed with patient. He was given an after visit summary which includes diagnoses, current medications, & vitals. He expressed understanding with the diagnosis and plan. Aspects of this note may have been generated using voice recognition software. Despite editing, there may be some syntax errors.        Ian Li MD

## 2018-08-10 NOTE — PATIENT INSTRUCTIONS
Medicare Wellness Visit, Male    The best way to live healthy is to have a lifestyle where you eat a well-balanced diet, exercise regularly, limit alcohol use, and quit all forms of tobacco/nicotine, if applicable. Regular preventive services are another way to keep healthy. Preventive services (vaccines, screening tests, monitoring & exams) can help personalize your care plan, which helps you manage your own care. Screening tests can find health problems at the earliest stages, when they are easiest to treat. [facility] follows the current, evidence-based guidelines published by the [facility] ([facility]) when recommending preventive services for our patients. Because we follow these guidelines, sometimes recommendations change over time as research supports it. (For example, a prostate screening blood test is no longer routinely recommended for men with no symptoms.)    Of course, you and your provider may decide to screen more often for some diseases, based on your risk and co-morbidities (chronic disease you are already diagnosed with). Preventive services for you include:    - Medicare offers their members a free annual wellness visit, which is time for you and your primary care provider to discuss and plan for your preventive service needs. Take advantage of this benefit every year!    -All people over age 72 should receive the recommended pneumonia vaccines. Current USPSTF guidelines recommend a series of two vaccines for the best pneumonia protection.     -All adults should have a yearly flu vaccine and a tetanus vaccine every 10 years.  All adults age 61 years should receive a shingles vaccine once in their lifetime.      -All adults age 38-68 years who are overweight should have a diabetes screening test once every three years.     -Other screening tests & preventive services for persons with diabetes include: an eye exam to screen for diabetic retinopathy, a kidney function test, a foot exam, and stricter control over your cholesterol.     -Cardiovascular screening for adults with routine risk involves an electrocardiogram (ECG) at intervals determined by the provider.     -Colorectal cancer screenings should be done for adults age 54-65 years with normal risk. There are a number of acceptable methods of screening for this type of cancer. Each test has its own benefits and drawbacks. Discuss with your provider what is most appropriate for you during your annual wellness visit. The different tests include: colonoscopy (considered the best screening method), a fecal occult blood test, a fecal DNA test, and sigmoidoscopy.    -All adults born between [facility] should be screened once for Hepatitis C.    -An Abdominal Aortic Aneurysm (AAA) Screening is recommended for men age 73-68 who has ever smoked in their lifetime. Here is a list of your current Health Maintenance items (your personalized list of preventive services) with a due date:  Health Maintenance Due   Topic Date Due    Flu Vaccine  08/01/2018    Annual Well Visit  08/09/2018          Learning About Darryl Eisenberg  What is a living will? A living will is a legal form you use to write down the kind of care you want at the end of your life. It is used by the health professionals who will treat you if you aren't able to decide for yourself. If you put your wishes in writing, your loved ones and others will know what kind of care you want. They won't need to guess. This can ease your mind and be helpful to others. A living will is not the same as an estate or property will. An estate will explains what you want to happen with your money and property after you die. Is a living will a legal document? A living will is a legal document. Each state has its own laws about living junior. If you move to another state, make sure that your living will is legal in the state where you now live.  Or you might use a universal form that has been approved by many states. This kind of form can sometimes be completed and stored online. Your electronic copy will then be available wherever you have a connection to the Internet. In most cases, doctors will respect your wishes even if you have a form from a different state. · You don't need an  to complete a living will. But legal advice can be helpful if your state's laws are unclear, your health history is complicated, or your family can't agree on what should be in your living will. · You can change your living will at any time. Some people find that their wishes about end-of-life care change as their health changes. · In addition to making a living will, think about completing a medical power of  form. This form lets you name the person you want to make end-of-life treatment decisions for you (your \"health care agent\") if you're not able to. Many hospitals and nursing homes will give you the forms you need to complete a living will and a medical power of . · Your living will is used only if you can't make or communicate decisions for yourself anymore. If you become able to make decisions again, you can accept or refuse any treatment, no matter what you wrote in your living will. · Your state may offer an online registry. This is a place where you can store your living will online so the doctors and nurses who need to treat you can find it right away. What should you think about when creating a living will? Talk about your end-of-life wishes with your family members and your doctor. Let them know what you want. That way the people making decisions for you won't be surprised by your choices. Think about these questions as you make your living will:  · Do you know enough about life support methods that might be used?  If not, talk to your doctor so you know what might be done if you can't breathe on your own, your heart stops, or you're unable to swallow. · What things would you still want to be able to do after you receive life-support methods? Would you want to be able to walk? To speak? To eat on your own? To live without the help of machines? · If you have a choice, where do you want to be cared for? In your home? At a hospital or nursing home? · Do you want certain Adventist practices performed if you become very ill? · If you have a choice at the end of your life, where would you prefer to die? At home? In a hospital or nursing home? Somewhere else? · Would you prefer to be buried or cremated? · Do you want your organs to be donated after you die? What should you do with your living will? · Make sure that your family members and your health care agent have copies of your living will. · Give your doctor a copy of your living will to keep in your medical record. If you have more than one doctor, make sure that each one has a copy. · You may want to put a copy of your living will where it can be easily found. Where can you learn more? Go to http://wallyHardMetricspayal.info/. Enter O396 in the search box to learn more about \"Learning About Living [employer] [employer]. \"  Current as of: August 8, 2016  Content Version: 11.3  © 4317-3254 [employer]. Care instructions adapted under license by [employer] (which disclaims liability or warranty for this information). If you have questions about a medical condition or this instruction, always ask your healthcare professional. [place] [address] any warranty or liability for your use of this information. Abnormal Weight Loss: Care Instructions  Your Care Instructions    There are two types of weight loss-normal and abnormal. The normal kind happens when you are trying to lose weight by exercising more or eating less. The abnormal kind happens when you are not trying to lose weight. Many medical problems can cause abnormal weight loss.  These include problems with your thyroid gland, long-term infections, mouth or throat problems that make it hard to eat, and digestive problems. They also include depression and cancer. Some medicines also may cause you to lose weight. You can work with your doctor to find the cause of your weight loss. You will probably need tests to do this. Follow-up care is a key part of your treatment and safety. Be sure to make and go to all appointments, and call your doctor if you are having problems. It's also a good idea to know your test results and keep a list of the medicines you take. How can you care for yourself at home? · Weigh yourself at the same time every day. It's best to do it first thing in the morning after you empty your bladder. Be sure to always wear the same amount of clothing. · Write down any changes in your weight and the possible causes. Discuss these with your doctor. · Your doctor may want you to change your diet. Do your best to follow his or her advice. · Ask your doctor if you should see a dietitian. This is a person who can help you plan meals that work best for your lifestyle. · Note any changes in bowel habits. These may include changes in how often you have a bowel movement. Other changes include the color and size of your stools and how solid they are. · If you are prescribed medicines, take them exactly as prescribed. Call your doctor if you think you are having a problem with your medicine. You will get more details on the specific medicines your doctor prescribes. When should you call for help? Watch closely for changes in your health, and be sure to contact your doctor if:    · You do not get better as expected.     · You continue to lose weight. Where can you learn more? Go to http://wally-payal.info/. Enter A790 in the search box to learn more about \"Abnormal Weight Loss: Care Instructions. \"  Current as of: October 9, 2017  Content Version: 11.7  © 3067-1746 [employer]. Care instructions adapted under license by [employer] (which disclaims liability or warranty for this information). If you have questions about a medical condition or this instruction, always ask your healthcare professional. [place] [address] any warranty or liability for your use of this information.

## 2018-08-10 NOTE — ACP (ADVANCE CARE PLANNING)
Advance Care Planning    Advance Care Planning (ACP) Provider Note - Comprehensive     Date of ACP Conversation: 08/10/18  Persons included in Conversation:  patient  Length of ACP Conversation in minutes: <16 minutes (Non-Billable)    Authorized Decision Maker (if patient is incapable of making informed decisions): This person is: Healthcare Agent/Medical Power of  under Advance Directive      He has an advanced directive - a copy has been provided & still reflects him wishes. Reviewed DNR/DNI and patient is not interested. General ACP for ALL Patients with Decision Making Capacity:  Importance of advance care planning, including choosing a healthcare agent to communicate patient's healthcare decisions if patient lost the ability to make decisions, such as after a sudden illness or accident  Understanding of the healthcare agent role was assessed and information provided  Opportunity offered to explore how cultural, Mormonism, spiritual, or personal beliefs would affect decisions for future care  Exploration of values, goals, and preferences if recovery is not expected, even with continued medical treatment in the event of: Imminent death or severe, permanent brain injury    For Serious or Chronic Illness:  Understanding of CPR, goals and expected outcomes, benefits and burdens discussed.   Understanding of medical condition  Information on CPR success rates provided (e.g. for CPR in hospital, survival to d/c at two weeks is 22%, for chronically ill or elderly/frail survival is less than 3%)    Interventions Provided:  Recommended communicating the plan and making copies for the healthcare agent, personal physician, and others as appropriate (e.g., health system)  Recommended review of completed ACP document annually or upon change in health status

## 2018-08-10 NOTE — LETTER
Name:Nataly Nieves AXP:2[birth year]2 MR #:[id number] Provider Name:Pravin Burr MD  
*[id number]* BSMG-491 (5/16) Page 1 of 5 Initial [employer] CONTROLLED SUBSTANCE AGREEMENT I may be prescribed medications that are controlled substances as part  of my treatment plan for management of my medical condition(s). The goal of my treatment plan is to maintain and/or improve my health and wellbeing. Because controlled substances have an increased risk of abuse or harm, continual re-evaluation is needed determine if the goals of my treatment plan are being met for my safety and the safety of others. Amos Briones  am entering into this Controlled Substance Agreement with my provider, Luis Dennis MD at [address] [place] [address] . I understand that successful treatment requires mutual trust and honesty between me and my provider. I understand that there are state and federal laws and regulations which apply to the medications that my provider may prescribe that must be followed. I understand there are risks and benefits ts of taking the medicines that my provider may prescribe. I understand and agree that following this Agreement is necessary in continuing my provider-patient relationship and success of my treatment plan. As a part of my treatment plan, I agree to the following: COMMUNICATION: 
 
1. I will communicate fully with my provider about my medical condition(s), including the effect on my daily life and how well my medications are helping. I will tell my provider all of the medications that I take for any reason, including medications I receive from another health care provider, and will notify my provider about all issues, problems or concerns, including any side effects, which may be related to my medications. I understand that this information allows my provider to adjust my treatment plan to help manage my medical condition.  I understand that this information will become part of my permanent medical record. 2. I will notify my provider if I have a history of alcohol/drug misuse/addiction or if I have had treatment for alcohol/drug addiction in the past, or if I have a new problem with or concern about alcohol/drug use/addiction, because this increases the likelihood of high risk behaviors and may lead to serious medical conditions. 3. Females Only: I will notify my provider if I am or become pregnant, or if I intend to become pregnant, or if I intend to breastfeed. I understand that communication of these issues with my provider is important, due to possible effects my medication could have on an unborn fetus or breastfeeding child. Name:.Nitin Melendrez BF MR #:[id number] Provider Name:Pravin Schaefer MD  
*CZGC-[id number]* BSMG-491 () Page 2 of 5 Initial [employer] MISUSE OF MEDICATIONS / DRUGS: 
 
1. I agree to take all controlled substances as prescribed, and will not misuse or abuse any controlled substances prescribed by my provider. For my safety, I will not increase the amount of medicine I take without first talking with and getting permission from my provider. 2. If I have a medical emergency, another health care provider may prescribe me medication. If I seek emergency treatment, I will notify my provider within seventy-two (72) hours. 3. I understand that my provider may discuss my use and/or possible misuse/abuse of controlled substances and alcohol, as appropriate, with any health care provider involved in my care, pharmacist or legal authority. ILLEGAL DRUGS: 
 
1. I will not use illegal drugs of any kind, including but not limited to marijuana, heroin, cocaine, or any prescription drug which is not prescribed to me. DRUG DIVERSION / PRESCRIPTION FRAUD: 
 
1. I will not share, sell, trade, give away, or otherwise misuse my prescriptions or medications. 2. I will not alter any prescriptions provided to me by my provider. SINGLE PROVIDER: 
 
1. I agree that all controlled substances that I take will be prescribed only by my provider (or his/her covering provider) under this Agreement. This agreement does not prevent me from seeking emergency medical treatment or receiving pain management related to a surgery. PROTECTING MEDICATIONS: 
 
1. I am responsible for keeping my prescriptions and medications in a safe and secure place including safeguarding them from loss or theft. I understand that lost, stolen or damaged/destroyed prescriptions or medications will not be replaced. Name:.Nitin Melendrez ABP:[birth year] MR #:[id number] Provider Name:Pravin Schaefer MD  
*[id number]* BSMG-491 (5/16) Page 3 of 5 Initial EPIS PRESCRIPTION RENEWALS/REFILLS: 
 
1. I will follow my controlled substance medication schedule as prescribed by my provider. 2. I understand and agree that I will make any requests for renewals or refills of my prescriptions only at the time of an office visit or during my providers regular office hours subject to the prescription refill requirements of the individual practice. 3. I understand that my provider may not call in prescriptions for controlled substances to my pharmacy. 4. I understand that my provider may adjust or discontinue these medications as deemed appropriate for my medical treatment plan. This Agreement does not guarantee the prescription of controlled medications. 5. I agree that if my medications are adjusted or discontinued, I will properly dispose of any remaining medications. I understand that I will be required to dispose of any remaining controlled medications prior to being provided with any prescriptions for other controlled medications.  
 
 
1. I authorize my provider and my pharmacy to cooperate fully with any local, state, or federal law enforcement agency in the investigation of any possible misuse, sale, or other diversion of my controlled substance prescriptions or medications. RISKS: 
 
 
1. I understand that if I do not adhere to this Agreement in any way, my provider may change my prescriptions, stop prescribing controlled substances or end our provider-patient relationship. 2. If my provider decides to stop prescribing medication, or decides to end our provider-patient relationship,my provider may require that I taper my medications slowly. If necessary, my provider may also provide a prescription for other medications to treat my withdrawal symptoms. UNDERSTANDING THIS AGREEMENT: 
 
I understand that my provider may adjust or stop my prescriptions for controlled substances based on my medical condition and my treatment plan. I understand that this Agreement does not guarantee that I will be prescribed medications or controlled substances. I understand that controlled substances may be just one part 
of my treatment plan. My initial on each page and my signature below shows that I have read each page of this Agreement, I have had an opportunity to ask questions, and all of my questions have been answered to my satisfaction by my provider. By signing below, I agree to comply with this Agreement, and I understand that if I do not follow the Agreements listed above, my provider may stop 
 
 
 
_________________________________________  Date/Time 8/10/2018 11:01 AM   
             (Patient Signature)

## 2018-08-18 LAB
ALBUMIN SERPL-MCNC: 4.4 G/DL (ref 3.5–4.8)
ALBUMIN/GLOB SERPL: 1.8 {RATIO} (ref 1.2–2.2)
ALP SERPL-CCNC: 76 IU/L (ref 39–117)
ALT SERPL-CCNC: 17 IU/L (ref 0–44)
AST SERPL-CCNC: 22 IU/L (ref 0–40)
BASOPHILS # BLD AUTO: 0 X10E3/UL (ref 0–0.2)
BASOPHILS NFR BLD AUTO: 0 %
BILIRUB SERPL-MCNC: 0.7 MG/DL (ref 0–1.2)
BUN SERPL-MCNC: 30 MG/DL (ref 8–27)
BUN/CREAT SERPL: 30 (ref 10–24)
CALCIUM SERPL-MCNC: 9.4 MG/DL (ref 8.6–10.2)
CHLORIDE SERPL-SCNC: 98 MMOL/L (ref 96–106)
CHOLEST SERPL-MCNC: 163 MG/DL (ref 100–199)
CO2 SERPL-SCNC: 26 MMOL/L (ref 20–29)
CREAT SERPL-MCNC: 1.01 MG/DL (ref 0.76–1.27)
EOSINOPHIL # BLD AUTO: 0.2 X10E3/UL (ref 0–0.4)
EOSINOPHIL NFR BLD AUTO: 2 %
ERYTHROCYTE [DISTWIDTH] IN BLOOD BY AUTOMATED COUNT: 13.4 % (ref 12.3–15.4)
EST. AVERAGE GLUCOSE BLD GHB EST-MCNC: 126 MG/DL
GLOBULIN SER CALC-MCNC: 2.5 G/DL (ref 1.5–4.5)
GLUCOSE SERPL-MCNC: 109 MG/DL (ref 65–99)
HBA1C MFR BLD: 6 % (ref 4.8–5.6)
HCT VFR BLD AUTO: 44.4 % (ref 37.5–51)
HDLC SERPL-MCNC: 63 MG/DL
HGB BLD-MCNC: 15.4 G/DL (ref 13–17.7)
IMM GRANULOCYTES # BLD: 0 X10E3/UL (ref 0–0.1)
IMM GRANULOCYTES NFR BLD: 0 %
LDLC SERPL CALC-MCNC: 82 MG/DL (ref 0–99)
LYMPHOCYTES # BLD AUTO: 1.9 X10E3/UL (ref 0.7–3.1)
LYMPHOCYTES NFR BLD AUTO: 17 %
MCH RBC QN AUTO: 31.7 PG (ref 26.6–33)
MCHC RBC AUTO-ENTMCNC: 34.7 G/DL (ref 31.5–35.7)
MCV RBC AUTO: 91 FL (ref 79–97)
MONOCYTES # BLD AUTO: 1 X10E3/UL (ref 0.1–0.9)
MONOCYTES NFR BLD AUTO: 9 %
NEUTROPHILS # BLD AUTO: 7.9 X10E3/UL (ref 1.4–7)
NEUTROPHILS NFR BLD AUTO: 72 %
PLATELET # BLD AUTO: 244 X10E3/UL (ref 150–379)
POTASSIUM SERPL-SCNC: 3.3 MMOL/L (ref 3.5–5.2)
PROT SERPL-MCNC: 6.9 G/DL (ref 6–8.5)
PSA SERPL-MCNC: 2.2 NG/ML (ref 0–4)
RBC # BLD AUTO: 4.86 X10E6/UL (ref 4.14–5.8)
SODIUM SERPL-SCNC: 143 MMOL/L (ref 134–144)
TRIGL SERPL-MCNC: 91 MG/DL (ref 0–149)
VLDLC SERPL CALC-MCNC: 18 MG/DL (ref 5–40)
WBC # BLD AUTO: 11.2 X10E3/UL (ref 3.4–10.8)

## 2018-08-20 NOTE — PROGRESS NOTES
Results released to patient via [employer]. All labs are stable or at goal for him except for a few subtle abnormalities. WBC just barely high, will monitor. Low K, first time, will monitor. A1c and FBS stable. PSA acceptable.

## 2018-08-22 ENCOUNTER — HOSPITAL ENCOUNTER (OUTPATIENT)
Dept: LAB | Age: 73
Discharge: HOME | End: 2018-08-22
Payer: MEDICARE

## 2018-08-22 PROCEDURE — 82570 ASSAY OF URINE CREATININE: CPT

## 2018-08-28 LAB — DRUGS UR: NORMAL

## 2018-08-28 NOTE — PROGRESS NOTES
Results released to patient via [facility]. MIKA [employer] + for THC. Will not be able to refill narcotics.

## 2018-08-29 RX ORDER — HYDROCHLOROTHIAZIDE 25 MG/1
TABLET ORAL
Qty: 90 TAB | Refills: 3 | Status: SHIPPED | OUTPATIENT
Start: 2018-08-29 | End: 2018-11-21 | Stop reason: SDUPTHER

## 2018-10-01 DIAGNOSIS — N40.1 BENIGN PROSTATIC HYPERPLASIA WITH NOCTURIA: ICD-10-CM

## 2018-10-01 DIAGNOSIS — R35.1 BENIGN PROSTATIC HYPERPLASIA WITH NOCTURIA: ICD-10-CM

## 2018-10-01 RX ORDER — TAMSULOSIN HYDROCHLORIDE 0.4 MG/1
0.4 CAPSULE ORAL DAILY
Qty: 90 CAP | Refills: 1 | Status: SHIPPED | OUTPATIENT
Start: 2018-10-01 | End: 2019-03-29 | Stop reason: SDUPTHER

## 2018-10-10 ENCOUNTER — OFFICE VISIT (OUTPATIENT)
Dept: INTERNAL MEDICINE CLINIC | Age: 73
End: 2018-10-10

## 2018-10-10 VITALS
HEART RATE: 96 BPM | HEIGHT: 73 IN | SYSTOLIC BLOOD PRESSURE: 116 MMHG | WEIGHT: 169.3 LBS | OXYGEN SATURATION: 96 % | BODY MASS INDEX: 22.44 KG/M2 | TEMPERATURE: 97.9 F | RESPIRATION RATE: 16 BRPM | DIASTOLIC BLOOD PRESSURE: 78 MMHG

## 2018-10-10 DIAGNOSIS — M54.50 CHRONIC MIDLINE LOW BACK PAIN WITHOUT SCIATICA: ICD-10-CM

## 2018-10-10 DIAGNOSIS — R35.1 BENIGN PROSTATIC HYPERPLASIA WITH NOCTURIA: ICD-10-CM

## 2018-10-10 DIAGNOSIS — R14.2 BELCHING SYMPTOM: ICD-10-CM

## 2018-10-10 DIAGNOSIS — Z23 ENCOUNTER FOR IMMUNIZATION: ICD-10-CM

## 2018-10-10 DIAGNOSIS — G89.29 CHRONIC MIDLINE LOW BACK PAIN WITHOUT SCIATICA: ICD-10-CM

## 2018-10-10 DIAGNOSIS — N40.1 BENIGN PROSTATIC HYPERPLASIA WITH NOCTURIA: ICD-10-CM

## 2018-10-10 DIAGNOSIS — R63.4 WEIGHT LOSS, UNINTENTIONAL: Primary | ICD-10-CM

## 2018-10-10 NOTE — PROGRESS NOTES
Had some weight loss. Wants to discuss letter from dentist. Flomax helping. A number of things he would like to discuss. William Sandoval is a 68 y.o. male  who present for routine immunizations. he  denies any symptoms , reactions or allergies that would exclude them from being immunized today. Risks and adverse reactions were discussed and the VIS was given to them. All questions were addressed. he was observed for 5 min post injection. There were no reactions observed.     Aamir Shepherd RN

## 2018-10-10 NOTE — PROGRESS NOTES
Doyle Wong is a 68 y.o. male who was seen in clinic today (10/10/2018). Assessment & Plan:   Diagnoses and all orders for this visit:    1. Weight loss, unintentional- this is a chronic problem, symptoms are: worsened, differential dx reviewed with the patient, and favor due to poor PO intake. Reviewed nothing to suggest cancer or medications. Reviewed ways to increase PO intake, encouraged 3 meals per day, and can continue shakes/supplements. Encouraged to weight himself weekly and update me in 1 month. Recent labs reviewed and w/o any specific etiology but several lab abnormalities, likely not of a clinical significance. 2. Benign prostatic hyperplasia with nocturia- improved on medications, reviewed expectations, no changes. 3. Chronic midline low back pain without sciatica- stable, intermittent, reviewed + UDS and pt is okay with not getting any further refills of pain medications, he is aware that he can get medications for emergencies/surgeries just not chronic prescription. 4. Belching symptom- this is a chronic problem but new to me, symptoms are: stable, differential dx reviewed with the patient, exact etiology is unclear at this time. Reviewed trial of PPI/H2B to see if GERD related and he will try this. Did review probiotics and monitoring foods (lactose). Red flags were reviewed with the patient to RTC or notify me. 5. Encounter for immunization  -     INFLUENZA VACCINE INACTIVATED (IIV), SUBUNIT, ADJUVANTED, IM  -     ADMIN INFLUENZA VIRUS VAC         Follow-up Disposition:  Return if symptoms worsen or fail to improve. Subjective: Cyn Gonzales was seen today for Weight Loss    He RTC to today to talk about weight loss. He reports he is not a \"great eater\". Only eating 1-2 meals per day. They don't sit down for family meal.  He reports weight was down to 160 at home. He made a conscious effort to increase PO intake. He is having 1-2 shakes per day.    We reviewed his chart and he has lost 20+ lbs loss in 3-4 yrs. Colonoscopy in '16 was normal.  PSA level was 2.2 a few months ago. We discussed his abnormal UDS. He reports using marijuana on a regular basis. He reports it helps his mood. Has not had any side effect. He is asking about what this means. We wanted to discuss the conversation I had with his dentist.  Dentist raised the concerns about Pagets. He is not having any pain. He was asking about an OTC medication: Niacinamide. Brief Labs:     Lab Results   Component Value Date/Time    Sodium 143 08/17/2018 09:24 AM    Potassium 3.3 08/17/2018 09:24 AM    Creatinine 1.01 08/17/2018 09:24 AM    Cholesterol, total 163 08/17/2018 09:24 AM    HDL Cholesterol 63 08/17/2018 09:24 AM    LDL, calculated 82 08/17/2018 09:24 AM    Triglyceride 91 08/17/2018 09:24 AM    Hemoglobin A1c 6.0 08/17/2018 09:24 AM          Prior to Admission medications    Medication Sig Start Date End Date Taking? Authorizing Provider   tamsulosin (FLOMAX) 0.4 mg capsule Take 1 Cap by mouth daily. 10/1/18  Yes Zabrina Bell MD   hydroCHLOROthiazide (HYDRODIURIL) 25 mg tablet TAKE 1 TABLET BY MOUTH EVERY DAY 8/29/18  Yes Zabrina Bell MD   oxyCODONE-acetaminophen (PERCOCET) 5-325 mg per tablet Take 1 Tab by mouth every four (4) hours as needed for Pain. Max Daily Amount: 6 Tabs. 8/10/18  Yes Zabrina Bell MD   celecoxib (CELEBREX) 200 mg capsule TAKE ONE CAPSULE BY MOUTH EVERY DAY 7/22/18  Yes Zabrina Bell MD   atorvastatin (LIPITOR) 10 mg tablet TAKE 1 TABLET BY MOUTH EVERY DAY 11/1/17  Yes Zabrina Bell MD   aspirin 81 mg chewable tablet Take 81 mg by mouth daily. Yes Historical Provider   multivitamin (ONE A DAY) tablet Take 1 Tab by mouth daily.    Yes Historical Provider          Allergies   Allergen Reactions    Acetaminophen Other (comments)     UNABLE TO URINATE, CAUSES FLANK PAIN           Review of Systems   Constitutional: Positive for weight loss. Negative for chills, fever and malaise/fatigue. Respiratory: Negative for cough and shortness of breath. Cardiovascular: Negative for chest pain, palpitations and leg swelling. Gastrointestinal: Negative for abdominal pain, constipation, diarrhea, heartburn, nausea and vomiting. He reports increase in belching   Genitourinary: Negative for frequency, hematuria and urgency. Skin: Negative for rash. Neurological: Negative for tingling, sensory change, focal weakness and headaches. Objective:   Physical Exam   Constitutional: No distress. Eyes: Conjunctivae are normal. No scleral icterus. Cardiovascular: Regular rhythm and normal heart sounds. No murmur heard. Pulmonary/Chest: Effort normal and breath sounds normal. He has no wheezes. He has no rales. Abdominal: Soft. Bowel sounds are normal. He exhibits no mass. There is no hepatosplenomegaly. There is no tenderness. Lymphadenopathy:     He has no cervical adenopathy. Psychiatric: He has a normal mood and affect. His behavior is normal.         Visit Vitals    /78    Pulse 96    Temp 97.9 °F (36.6 °C) (Oral)    Resp 16    Ht 6' 0.5\" (1.842 m)    Wt 169 lb 4.8 oz (76.8 kg)    SpO2 96%    BMI 22.65 kg/m2         Disclaimer:  Advised him to call back or return to office if symptoms worsen/change/persist.  Discussed expected course/resolution/complications of diagnosis in detail with patient. Medication risks/benefits/costs/interactions/alternatives discussed with patient. He was given an after visit summary which includes diagnoses, current medications, & vitals. He expressed understanding with the diagnosis and plan. Aspects of this note may have been generated using voice recognition software. Despite editing, there may be some syntax errors.        Mitch Cardona MD

## 2018-10-18 RX ORDER — ATORVASTATIN CALCIUM 10 MG/1
TABLET, FILM COATED ORAL
Qty: 90 TAB | Refills: 3 | Status: SHIPPED | OUTPATIENT
Start: 2018-10-18 | End: 2019-10-05 | Stop reason: SDUPTHER

## 2018-11-21 NOTE — TELEPHONE ENCOUNTER
Last OV: 10-10-18  Next visit: 8-13-19  Last labs: 8-17-18    Refill request for HCTZ forwarded to provider for approval.

## 2018-11-22 RX ORDER — HYDROCHLOROTHIAZIDE 25 MG/1
TABLET ORAL
Qty: 90 TAB | Refills: 1 | Status: SHIPPED | OUTPATIENT
Start: 2018-11-22 | End: 2019-12-30

## 2019-01-15 RX ORDER — CELECOXIB 200 MG/1
CAPSULE ORAL
Qty: 90 CAP | Refills: 1 | Status: SHIPPED | OUTPATIENT
Start: 2019-01-15 | End: 2019-08-06 | Stop reason: SDUPTHER

## 2019-03-14 PROBLEM — K64.1 GRADE II HEMORRHOIDS: Status: ACTIVE | Noted: 2019-03-14

## 2019-03-29 DIAGNOSIS — N40.1 BENIGN PROSTATIC HYPERPLASIA WITH NOCTURIA: ICD-10-CM

## 2019-03-29 DIAGNOSIS — R35.1 BENIGN PROSTATIC HYPERPLASIA WITH NOCTURIA: ICD-10-CM

## 2019-03-29 RX ORDER — TAMSULOSIN HYDROCHLORIDE 0.4 MG/1
CAPSULE ORAL
Qty: 90 CAP | Refills: 1 | Status: SHIPPED | OUTPATIENT
Start: 2019-03-29 | End: 2020-09-15

## 2019-08-06 RX ORDER — CELECOXIB 200 MG/1
CAPSULE ORAL
Qty: 30 CAP | Refills: 5 | Status: SHIPPED | OUTPATIENT
Start: 2019-08-06 | End: 2019-12-03 | Stop reason: SDUPTHER

## 2019-08-20 ENCOUNTER — OFFICE VISIT (OUTPATIENT)
Dept: INTERNAL MEDICINE CLINIC | Age: 74
End: 2019-08-20

## 2019-08-20 VITALS
TEMPERATURE: 98.7 F | BODY MASS INDEX: 22.08 KG/M2 | SYSTOLIC BLOOD PRESSURE: 102 MMHG | DIASTOLIC BLOOD PRESSURE: 68 MMHG | HEART RATE: 76 BPM | RESPIRATION RATE: 16 BRPM | WEIGHT: 163 LBS | OXYGEN SATURATION: 97 % | HEIGHT: 72 IN

## 2019-08-20 DIAGNOSIS — N40.1 BENIGN PROSTATIC HYPERPLASIA WITH NOCTURIA: ICD-10-CM

## 2019-08-20 DIAGNOSIS — R73.03 PRE-DIABETES: ICD-10-CM

## 2019-08-20 DIAGNOSIS — Z71.89 ADVANCED CARE PLANNING/COUNSELING DISCUSSION: ICD-10-CM

## 2019-08-20 DIAGNOSIS — Z00.00 MEDICARE ANNUAL WELLNESS VISIT, SUBSEQUENT: Primary | ICD-10-CM

## 2019-08-20 DIAGNOSIS — Z12.5 PROSTATE CANCER SCREENING: ICD-10-CM

## 2019-08-20 DIAGNOSIS — E78.00 PURE HYPERCHOLESTEROLEMIA: ICD-10-CM

## 2019-08-20 DIAGNOSIS — M15.9 PRIMARY OSTEOARTHRITIS INVOLVING MULTIPLE JOINTS: ICD-10-CM

## 2019-08-20 DIAGNOSIS — Z13.6 SCREENING FOR CARDIOVASCULAR CONDITION: ICD-10-CM

## 2019-08-20 DIAGNOSIS — Z13.31 SCREENING FOR DEPRESSION: ICD-10-CM

## 2019-08-20 DIAGNOSIS — R63.4 WEIGHT LOSS, UNINTENTIONAL: ICD-10-CM

## 2019-08-20 DIAGNOSIS — Z23 ENCOUNTER FOR IMMUNIZATION: ICD-10-CM

## 2019-08-20 DIAGNOSIS — Z13.39 SCREENING FOR ALCOHOLISM: ICD-10-CM

## 2019-08-20 DIAGNOSIS — I10 HYPERTENSION, ESSENTIAL: ICD-10-CM

## 2019-08-20 DIAGNOSIS — R35.1 BENIGN PROSTATIC HYPERPLASIA WITH NOCTURIA: ICD-10-CM

## 2019-08-20 RX ORDER — AMOXICILLIN 500 MG/1
CAPSULE ORAL
Refills: 3 | COMMUNITY
Start: 2019-08-01

## 2019-08-20 NOTE — ACP (ADVANCE CARE PLANNING)
Advance Care Planning (ACP) Provider Note - Comprehensive     Date of ACP Conversation: 08/20/19  Persons included in Conversation:  patient  Length of ACP Conversation in minutes: <16 minutes (Non-Billable)    Authorized Decision Maker (if patient is incapable of making informed decisions):   Healthcare Agent/Medical Power of  under Advance Directive      He has an advanced directive - a copy has been provided & still reflects him wishes. Reviewed DNR/DNI and patient is not interested. General ACP for ALL Patients with Decision Making Capacity:  Importance of advance care planning, including choosing a healthcare agent to communicate patient's healthcare decisions if patient lost the ability to make decisions, such as after a sudden illness or accident  Understanding of the healthcare agent role was assessed and information provided  Opportunity offered to explore how cultural, Alevism, spiritual, or personal beliefs would affect decisions for future care  Exploration of values, goals, and preferences if recovery is not expected, even with continued medical treatment in the event of: Imminent death or severe, permanent brain injury    For Serious or Chronic Illness:  Understanding of CPR, goals and expected outcomes, benefits and burdens discussed.   Understanding of medical condition  Information on CPR success rates provided (e.g. for CPR in hospital, survival to d/c at two weeks is 22%, for chronically ill or elderly/frail survival is less than 3%)    Interventions Provided:  Recommended communicating the plan and making copies for the healthcare agent, personal physician, and others as appropriate (e.g., health system)  Recommended review of completed ACP document annually or upon change in health status

## 2019-08-20 NOTE — PATIENT INSTRUCTIONS
Medicare Wellness Visit, Male    The best way to live healthy is to have a lifestyle where you eat a well-balanced diet, exercise regularly, limit alcohol use, and quit all forms of tobacco/nicotine, if applicable. Regular preventive services are another way to keep healthy. Preventive services (vaccines, screening tests, monitoring & exams) can help personalize your care plan, which helps you manage your own care. Screening tests can find health problems at the earliest stages, when they are easiest to treat. [facility] follows the current, evidence-based guidelines published by the [facility] ([facility]) when recommending preventive services for our patients. Because we follow these guidelines, sometimes recommendations change over time as research supports it. (For example, a prostate screening blood test is no longer routinely recommended for men with no symptoms.)  Of course, you and your doctor may decide to screen more often for some diseases, based on your risk and co-morbidities (chronic disease you are already diagnosed with). Preventive services for you include:  - Medicare offers their members a free annual wellness visit, which is time for you and your primary care provider to discuss and plan for your preventive service needs. Take advantage of this benefit every year!  -All adults over age 72 should receive the recommended pneumonia vaccines. Current USPSTF guidelines recommend a series of two vaccines for the best pneumonia protection.   -All adults should have a flu vaccine yearly and an ECG.  All adults age 61 and older should receive a shingles vaccine once in their lifetime.    -All adults age 38-68 who are overweight should have a diabetes screening test once every three years.   -Other screening tests & preventive services for persons with diabetes include: an eye exam to screen for diabetic retinopathy, a kidney function test, a foot exam, and stricter control over your cholesterol.   -Cardiovascular screening for adults with routine risk involves an electrocardiogram (ECG) at intervals determined by the provider.   -Colorectal cancer screening should be done for adults age 54-65 with no increased risk factors for colorectal cancer. There are a number of acceptable methods of screening for this type of cancer. Each test has its own benefits and drawbacks. Discuss with your provider what is most appropriate for you during your annual wellness visit. The different tests include: colonoscopy (considered the best screening method), a fecal occult blood test, a fecal DNA test, and sigmoidoscopy.  -All adults born between [facility] should be screened once for Hepatitis C.  -An Abdominal Aortic Aneurysm (AAA) Screening is recommended for men age 73-68 who has ever smoked in their lifetime. Here is a list of your current Health Maintenance items (your personalized list of preventive services) with a due date:  Health Maintenance Due   Topic Date Due    Shingles Vaccine (1 of 2) 02/15/1995    AAA Screening  02/15/2010    Glaucoma Screening   07/28/2019    Flu Vaccine  08/01/2019    Annual Well Visit  08/11/2019          Learning About Living Felicia  What is a living will? A living will is a legal form you use to write down the kind of care you want at the end of your life. It is used by the health professionals who will treat you if you aren't able to decide for yourself. If you put your wishes in writing, your loved ones and others will know what kind of care you want. They won't need to guess. This can ease your mind and be helpful to others. A living will is not the same as an estate or property will. An estate will explains what you want to happen with your money and property after you die. Is a living will a legal document? A living will is a legal document. Each state has its own laws about living junior.  If you move to another state, make sure that your living will is legal in the state where you now live. Or you might use a universal form that has been approved by many states. This kind of form can sometimes be completed and stored online. Your electronic copy will then be available wherever you have a connection to the Internet. In most cases, doctors will respect your wishes even if you have a form from a different state. · You don't need an  to complete a living will. But legal advice can be helpful if your state's laws are unclear, your health history is complicated, or your family can't agree on what should be in your living will. · You can change your living will at any time. Some people find that their wishes about end-of-life care change as their health changes. · In addition to making a living will, think about completing a medical power of  form. This form lets you name the person you want to make end-of-life treatment decisions for you (your \"health care agent\") if you're not able to. Many hospitals and nursing homes will give you the forms you need to complete a living will and a medical power of . · Your living will is used only if you can't make or communicate decisions for yourself anymore. If you become able to make decisions again, you can accept or refuse any treatment, no matter what you wrote in your living will. · Your state may offer an online registry. This is a place where you can store your living will online so the doctors and nurses who need to treat you can find it right away. What should you think about when creating a living will? Talk about your end-of-life wishes with your family members and your doctor. Let them know what you want. That way the people making decisions for you won't be surprised by your choices. Think about these questions as you make your living will:  · Do you know enough about life support methods that might be used?  If not, talk to your doctor so you know what might be done if you can't breathe on your own, your heart stops, or you're unable to swallow. · What things would you still want to be able to do after you receive life-support methods? Would you want to be able to walk? To speak? To eat on your own? To live without the help of machines? · If you have a choice, where do you want to be cared for? In your home? At a hospital or nursing home? · Do you want certain Anabaptism practices performed if you become very ill? · If you have a choice at the end of your life, where would you prefer to die? At home? In a hospital or nursing home? Somewhere else? · Would you prefer to be buried or cremated? · Do you want your organs to be donated after you die? What should you do with your living will? · Make sure that your family members and your health care agent have copies of your living will. · Give your doctor a copy of your living will to keep in your medical record. If you have more than one doctor, make sure that each one has a copy. · You may want to put a copy of your living will where it can be easily found. Where can you learn more? Go to http://wally-payla.info/. Enter D871 in the search box to learn more about \"Learning About Living Irena Perez. \"  Current as of: August 8, 2016  Content Version: 11.3  © 7453-2648 [employer]. Care instructions adapted under license by [employer] (which disclaims liability or warranty for this information). If you have questions about a medical condition or this instruction, always ask your healthcare professional. [address] any warranty or liability for your use of this information.

## 2019-08-20 NOTE — PROGRESS NOTES
Zahida Roberson is a 76 y.o. male who was seen in clinic today (8/20/2019) for a full physical.          Assessment & Plan:   Diagnoses and all orders for this visit:    1. Medicare annual wellness visit, subsequent    2. Advanced care planning/counseling discussion    3. Encounter for immunization    4. Screening for alcoholism  -     UT ANNUAL ALCOHOL SCREEN 15 MIN    5. Screening for depression  -     DEPRESSION SCREEN ANNUAL    6. Prostate cancer screening  -     PSA SCREENING (SCREENING)    7. Screening for cardiovascular condition  -     US EXAM SCREENING AAA; Future    8. Weight loss, unintentional- chronic issue, overall stable the last year, feel this is life style related (sleeping 8-10 hrs and only eating 1-2 meals/day). Will repeat labs and check CXR. Reassured this has been present for a few years and w/o any new symptoms do not think there is anything pathologic.  -     METABOLIC PANEL, COMPREHENSIVE  -     CBC W/O DIFF  -     XR CHEST PA LAT; Future    9. Hypertension, essential- well controlled, low today, continue current treatment pending review of labs   -     METABOLIC PANEL, COMPREHENSIVE    10. Pure hypercholesterolemia- at goal, continue current treatment pending review of labs   -     METABOLIC PANEL, COMPREHENSIVE  -     LIPID PANEL    11. Benign prostatic hyperplasia with nocturia- well controlled off meds, okay to stay off meds    12. Primary osteoarthritis involving multiple joints- chronic, diffuse, using meds daily w/ okay relief, reviewed can increase to BID as needed, continue to f/u with surgeon prn. 13. Pre-diabetes- due for labs, will continue to monitor, due to weight loss do not restrict diet. -     METABOLIC PANEL, COMPREHENSIVE  -     HEMOGLOBIN A1C WITH EAG         Follow-up and Dispositions    · Return in about 6 months (around 2/20/2020), or if symptoms worsen or fail to improve, for Regular follow up. ------------------------------------------------------------------------------------------    Subjective: Annual Wellness Visit- Subsequent Visit    End of Life Planning: This was discussed with him today and he has an advanced directive - a copy has been provided. Reviewed DNR/DNI and patient is not interested. Depression Screen:   3 most recent PHQ Screens 8/20/2019   Little interest or pleasure in doing things Not at all   Feeling down, depressed, irritable, or hopeless Not at all   Total Score PHQ 2 0       Fall Risk:   Fall Risk Assessment, last 12 mths 8/20/2019   Able to walk? Yes   Fall in past 12 months? No       Abuse Screen:  Abuse Screening Questionnaire 8/20/2019   Do you ever feel afraid of your partner? N   Are you in a relationship with someone who physically or mentally threatens you? N   Is it safe for you to go home? Y         Alcohol Risk Factor Screening: On any occasion during the past 3 months, have you had more than 4 drinks containing alcohol? No  Do you average more than 14 drinks per week? No    Hearing Loss: Hearing is good, wears hearing aides.     Cognition Screen:  Has your family/caregiver stated any concerns about your memory: no  Cognition: appears appropriate for age attention/concentration and appears appropriate safety awareness    Activities of Daily Living:    Requires assistance with: no ADLs  Home contains: handrails and grab bars  Exercise: very active    Adult Nutrition Screen:  Unplanned weight loss (5 pts) - see A/P      Health Maintenance  Daily Aspirin: recommended to stop aspirin  AAA Screening: order placed  Glaucoma Screening: Records requested      Immunizations:    Influenza: he will plan on getting it this fall   Tetanus: up to date   Shingles: not up to date - he is on waiting list   Pneumonia: up to date  Cancer screening:   Prostate: guidelines reviewed, will do today and plan on stopping  Colon: guidelines reviewed, up to date      Patient Care Team:  Jeremías Flores MD as PCP - General (Internal Medicine)  Indira Devries DO (Dermatology)  Gonzalo Corbett MD (Orthopedic Surgery)  Hermelinda Johnson MD (Optometry)       In addition to the above issues we also reviewed the following acute and/or chronic problems:    Cardiovascular Review  The patient has hypertension and hyperlipidemia. Since last visit: weight has decreased. He reports taking medications as instructed, no medication side effects noted, patient does not perform home BP monitoring. Diet and Lifestyle: generally follows a low fat low cholesterol diet, generally follows a low sodium diet, exercises regularly. Labs: reviewed and discussed with patient. Urology Review  He is here today because of BPH. He is off Flomax. He reports noticing no improvement on medications. He stopped and it has not gotten worse. His symptoms include: nocturia x 2-3. He denies: urinary hesitancy, urinary frequency, double voiding, weak stream.      Chronic Pain (> 3 months)  He RTC today to discuss his osteoarthritis that is affecting all joints. Significant changes since last visit: did see ortho, told he should get a shoulder replacement. Due to tennis will defer. He reports his symptoms are stable. He is able to do his normal daily activities. He reports using Celebrex 1 tab daily w/ good relief. The following sections were reviewed & updated as appropriate: PMH, PSH, FH, and SH. Patient Active Problem List   Diagnosis Code    Hypertension, essential I10    Hyperlipidemia E78.5    Skin cancer C44.90    Pre-diabetes R73.03    Chronic lower back pain M54.5, G89.29    RBBB (right bundle branch block) I45.10    Benign prostatic hyperplasia with nocturia N40.1, R35.1    Grade II hemorrhoids K64.1          Prior to Admission medications    Medication Sig Start Date End Date Taking?  Authorizing Provider   amoxicillin (AMOXIL) 500 mg capsule TAKE 4 CAPSULES BY MOUTH 1 HOUR PRIOR TO DENTAL APPT 8/1/19  Yes Provider, Historical   celecoxib (CELEBREX) 200 mg capsule TAKE ONE CAPSULE BY MOUTH EVERY DAY 8/6/19  Yes Radha Lee MD   hydroCHLOROthiazide (HYDRODIURIL) 25 mg tablet TAKE 1 TABLET BY MOUTH EVERY DAY 11/22/18  Yes Radha Lee MD   atorvastatin (LIPITOR) 10 mg tablet TAKE 1 TABLET BY MOUTH EVERY DAY 10/18/18  Yes Radha Lee MD   aspirin 81 mg chewable tablet Take 81 mg by mouth daily. Yes Provider, Historical   multivitamin (ONE A DAY) tablet Take 1 Tab by mouth daily. Yes Provider, Historical   tamsulosin (FLOMAX) 0.4 mg capsule TAKE 1 CAPSULE BY MOUTH EVERY DAY 3/29/19   Radha Lee MD          Allergies   Allergen Reactions    Acetaminophen Other (comments)     UNABLE TO URINATE, CAUSES FLANK PAIN              Review of Systems   Constitutional: Positive for weight loss. Negative for chills and fever. Respiratory: Negative for cough and shortness of breath. Cardiovascular: Negative for chest pain and palpitations. Gastrointestinal: Negative for abdominal pain, blood in stool, constipation, diarrhea, heartburn, nausea and vomiting. Musculoskeletal: Positive for back pain and joint pain. Negative for myalgias. Neurological: Negative for tingling, focal weakness and headaches. Endo/Heme/Allergies: Does not bruise/bleed easily. Psychiatric/Behavioral: Negative for depression. The patient is not nervous/anxious and does not have insomnia. Objective:   Physical Exam   Constitutional: No distress. HENT:   Mouth/Throat: Mucous membranes are normal.   Eyes: Conjunctivae are normal. No scleral icterus. Neck: Neck supple. No thyromegaly present. Cardiovascular: Regular rhythm and normal heart sounds. No murmur heard. Pulmonary/Chest: Effort normal and breath sounds normal. He has no wheezes. He has no rales. Abdominal: Soft. Bowel sounds are normal. He exhibits no mass. There is no hepatosplenomegaly.  There is no tenderness. Musculoskeletal: He exhibits no edema. Lymphadenopathy:     He has no cervical adenopathy. Skin: No rash noted. Psychiatric: He has a normal mood and affect. His behavior is normal.          Visit Vitals  /68   Pulse 76   Temp 98.7 °F (37.1 °C) (Oral)   Resp 16   Ht 6' (1.829 m)   Wt 163 lb (73.9 kg)   SpO2 97%   BMI 22.11 kg/m²          Advised him to call back or return to office if symptoms worsen/change/persist.  Discussed expected course/resolution/complications of diagnosis in detail with patient. Medication risks/benefits/costs/interactions/alternatives discussed with patient. He was given an after visit summary which includes diagnoses, current medications, & vitals. He expressed understanding with the diagnosis and plan. Aspects of this note may have been generated using voice recognition software. Despite editing, there may be some syntax errors.        Samson Malave MD

## 2019-08-28 ENCOUNTER — HOSPITAL ENCOUNTER (OUTPATIENT)
Dept: ULTRASOUND IMAGING | Age: 74
Discharge: HOME | End: 2019-08-28
Attending: INTERNAL MEDICINE
Payer: MEDICARE

## 2019-08-28 ENCOUNTER — HOSPITAL ENCOUNTER (OUTPATIENT)
Dept: LAB | Age: 74
Discharge: HOME | End: 2019-08-28
Payer: MEDICARE

## 2019-08-28 ENCOUNTER — HOSPITAL ENCOUNTER (OUTPATIENT)
Dept: GENERAL RADIOLOGY | Age: 74
Discharge: HOME | End: 2019-08-28
Attending: INTERNAL MEDICINE
Payer: MEDICARE

## 2019-08-28 DIAGNOSIS — R63.4 WEIGHT LOSS, UNINTENTIONAL: ICD-10-CM

## 2019-08-28 DIAGNOSIS — Z13.6 SCREENING FOR CARDIOVASCULAR CONDITION: ICD-10-CM

## 2019-08-28 PROCEDURE — 84153 ASSAY OF PSA TOTAL: CPT

## 2019-08-28 PROCEDURE — 76706 US ABDL AORTA SCREEN AAA: CPT

## 2019-08-28 PROCEDURE — 80053 COMPREHEN METABOLIC PANEL: CPT

## 2019-08-28 PROCEDURE — 71046 X-RAY EXAM CHEST 2 VIEWS: CPT

## 2019-08-28 PROCEDURE — 85027 COMPLETE CBC AUTOMATED: CPT

## 2019-08-28 PROCEDURE — 83036 HEMOGLOBIN GLYCOSYLATED A1C: CPT

## 2019-08-28 PROCEDURE — 80061 LIPID PANEL: CPT

## 2019-08-28 PROCEDURE — 36415 COLL VENOUS BLD VENIPUNCTURE: CPT

## 2019-08-29 LAB
ALBUMIN SERPL-MCNC: 4.3 G/DL (ref 3.5–4.8)
ALBUMIN/GLOB SERPL: 1.7 {RATIO} (ref 1.2–2.2)
ALP SERPL-CCNC: 74 IU/L (ref 39–117)
ALT SERPL-CCNC: 15 IU/L (ref 0–44)
AST SERPL-CCNC: 18 IU/L (ref 0–40)
BILIRUB SERPL-MCNC: 0.7 MG/DL (ref 0–1.2)
BUN SERPL-MCNC: 21 MG/DL (ref 8–27)
BUN/CREAT SERPL: 22 (ref 10–24)
CALCIUM SERPL-MCNC: 9.4 MG/DL (ref 8.6–10.2)
CHLORIDE SERPL-SCNC: 101 MMOL/L (ref 96–106)
CHOLEST SERPL-MCNC: 181 MG/DL (ref 100–199)
CO2 SERPL-SCNC: 28 MMOL/L (ref 20–29)
CREAT SERPL-MCNC: 0.94 MG/DL (ref 0.76–1.27)
ERYTHROCYTE [DISTWIDTH] IN BLOOD BY AUTOMATED COUNT: 12.8 % (ref 12.3–15.4)
EST. AVERAGE GLUCOSE BLD GHB EST-MCNC: 117 MG/DL
GLOBULIN SER CALC-MCNC: 2.5 G/DL (ref 1.5–4.5)
GLUCOSE SERPL-MCNC: 92 MG/DL (ref 65–99)
HBA1C MFR BLD: 5.7 % (ref 4.8–5.6)
HCT VFR BLD AUTO: 43.9 % (ref 37.5–51)
HDLC SERPL-MCNC: 61 MG/DL
HGB BLD-MCNC: 15.1 G/DL (ref 13–17.7)
LDLC SERPL CALC-MCNC: 103 MG/DL (ref 0–99)
MCH RBC QN AUTO: 32.2 PG (ref 26.6–33)
MCHC RBC AUTO-ENTMCNC: 34.4 G/DL (ref 31.5–35.7)
MCV RBC AUTO: 94 FL (ref 79–97)
PLATELET # BLD AUTO: 239 X10E3/UL (ref 150–450)
POTASSIUM SERPL-SCNC: 3.9 MMOL/L (ref 3.5–5.2)
PROT SERPL-MCNC: 6.8 G/DL (ref 6–8.5)
PSA SERPL-MCNC: 1.9 NG/ML (ref 0–4)
RBC # BLD AUTO: 4.69 X10E6/UL (ref 4.14–5.8)
SODIUM SERPL-SCNC: 143 MMOL/L (ref 134–144)
TRIGL SERPL-MCNC: 87 MG/DL (ref 0–149)
VLDLC SERPL CALC-MCNC: 17 MG/DL (ref 5–40)
WBC # BLD AUTO: 9.2 X10E3/UL (ref 3.4–10.8)

## 2019-08-29 NOTE — PROGRESS NOTES
Results released to patient via [employer]. All labs are stable or at goal for him. FBS and A1c improved w/ weight loss. Lipids slightly worse but still acceptable. PSA stable to lower. Previously elevated WBC improved.

## 2019-09-20 DIAGNOSIS — R63.4 WEIGHT LOSS, UNINTENTIONAL: Primary | ICD-10-CM

## 2019-09-24 ENCOUNTER — CLINICAL SUPPORT (OUTPATIENT)
Dept: INTERNAL MEDICINE CLINIC | Age: 74
End: 2019-09-24

## 2019-09-24 ENCOUNTER — HOSPITAL ENCOUNTER (OUTPATIENT)
Dept: LAB | Age: 74
Discharge: HOME | End: 2019-09-24
Payer: MEDICARE

## 2019-09-24 DIAGNOSIS — Z23 ENCOUNTER FOR IMMUNIZATION: ICD-10-CM

## 2019-09-24 PROCEDURE — 84443 ASSAY THYROID STIM HORMONE: CPT

## 2019-09-24 PROCEDURE — 36415 COLL VENOUS BLD VENIPUNCTURE: CPT

## 2019-09-24 NOTE — PROGRESS NOTES
Pharmacy Note - Immunizations    Verle Paget is a 76 y.o.  male  who present for a flu shot. He denies any symptoms, reactions or allergies that would exclude them from being immunized today. Risks and adverse reactions were discussed and the VIS was given to them. All questions were addressed. Verbal order received from Dr. Liliam Malhotra    Patient was observed for 10 min post injection. There were no reactions observed.     Woodrow Bishop, PHARMD BCACP

## 2019-09-24 NOTE — PATIENT INSTRUCTIONS
Vaccine Information Statement    Influenza (Flu) Vaccine (Inactivated or Recombinant): What You Need to Know    Many Vaccine Information Statements are available in Latvian and other languages. See www.immunize.org/vis  Hojas de información sobre vacunas están disponibles en español y en muchos otros idiomas. Visite www.immunize.org/vis    1. Why get vaccinated? Influenza vaccine can prevent influenza (flu). Flu is a contagious disease that spreads around the [facility] every year, usually between October and May. Anyone can get the flu, but it is more dangerous for some people. Infants and young children, people 72years of age and older, pregnant women, and people with certain health conditions or a weakened immune system are at greatest risk of flu complications. Pneumonia, bronchitis, sinus infections and ear infections are examples of flu-related complications. If you have a medical condition, such as heart disease, cancer or diabetes, flu can make it worse. Flu can cause fever and chills, sore throat, muscle aches, fatigue, cough, headache, and runny or stuffy nose. Some people may have vomiting and diarrhea, though this is more common in children than adults. Each year thousands of people in the [facility] die from flu, and many more are hospitalized. Flu vaccine prevents millions of illnesses and flu-related visits to the doctor each year. 2. Influenza vaccines     CDC recommends everyone 10months of age and older get vaccinated every flu season. Children 6 months through 6years of age may need 2 doses during a single flu season. Everyone else needs only 1 dose each flu season. It takes about 2 weeks for protection to develop after vaccination. There are many flu viruses, and they are always changing. Each year a new flu vaccine is made to protect against three or four viruses that are likely to cause disease in the upcoming flu season.  Even when the vaccine doesnt exactly match these viruses, it may still provide some protection. Influenza vaccine does not cause flu. Influenza vaccine may be given at the same time as other vaccines. 3. Talk with your health care provider    Tell your vaccine provider if the person getting the vaccine:   Has had an allergic reaction after a previous dose of influenza vaccine, or has any severe, life-threatening allergies.  Has ever had Guillain-Barré Syndrome (also called GBS). In some cases, your health care provider may decide to postpone influenza vaccination to a future visit. People with minor illnesses, such as a cold, may be vaccinated. People who are moderately or severely ill should usually wait until they recover before getting influenza vaccine. Your health care provider can give you more information. 4. Risks of a reaction     Soreness, redness, and swelling where shot is given, fever, muscle aches, and headache can happen after influenza vaccine.  There may be a very small increased risk of Guillain-Barré Syndrome (GBS) after inactivated influenza vaccine (the flu shot). Brenna Laboy children who get the flu shot along with pneumococcal vaccine (PCV13), and/or DTaP vaccine at the same time might be slightly more likely to have a seizure caused by fever. Tell your health care provider if a child who is getting flu vaccine has ever had a seizure. People sometimes faint after medical procedures, including vaccination. Tell your provider if you feel dizzy or have vision changes or ringing in the ears. As with any medicine, there is a very remote chance of a vaccine causing a severe allergic reaction, other serious injury, or death. 5. What if there is a serious problem? An allergic reaction could occur after the vaccinated person leaves the clinic.  If you see signs of a severe allergic reaction (hives, swelling of the face and throat, difficulty breathing, a fast heartbeat, dizziness, or weakness), call 9-1-1 and get the person to the nearest hospital.    For other signs that concern you, call your health care provider. Adverse reactions should be reported to the Vaccine Adverse Event Reporting System (VAERS). Your health care provider will usually file this report, or you can do it yourself. Visit the VAERS website at www.vaers. [facility].gov or call 6-947.447.4435. VAERS is only for reporting reactions, and VAERS staff do not give medical advice. 6. The National Vaccine Injury Compensation Program    The [facility] Vaccine Injury Compensation Program (VICP) is a federal program that was created to compensate people who may have been injured by certain vaccines. Visit the VICP website at www.hrsa.gov/vaccinecompensation or call 2-418.234.5462 to learn about the program and about filing a claim. There is a time limit to file a claim for compensation. 7. How can I learn more?  Ask your health care provider.  Call your local or state health department.  Contact the Centers for Disease Control and Prevention (CDC):  - Call 0-366.907.7283 (1-800-CDC-INFO) or  - Visit CDCs influenza website at www.cdc.gov/flu    Vaccine Information Statement (Interim)  Inactivated Influenza Vaccine   8/15/2019  42 MURALI Caraballo [id number]   Department of Health and Human Services  Centers for Disease Control and Prevention    Office Use Only

## 2019-09-25 ENCOUNTER — TELEPHONE (OUTPATIENT)
Dept: INTERNAL MEDICINE CLINIC | Age: 74
End: 2019-09-25

## 2019-09-25 LAB — TSH SERPL DL<=0.005 MIU/L-ACNC: 0.83 UIU/ML (ref 0.45–4.5)

## 2019-09-25 NOTE — TELEPHONE ENCOUNTER
Spoke with patient and let him know that he had the live shingles vaccine and that there is a new shingles vaccine that is more effective that he can get.

## 2019-09-25 NOTE — TELEPHONE ENCOUNTER
Kandace Forrest (Self) [phone] (H)     Pt says that he had the shingles vaccine last year the one dose. He thought it was suppose to be 2 doses, but pharm told him the one dose was ordered. Should he get the two dose?

## 2019-10-06 RX ORDER — ATORVASTATIN CALCIUM 10 MG/1
TABLET, FILM COATED ORAL
Qty: 90 TAB | Refills: 1 | Status: SHIPPED | OUTPATIENT
Start: 2019-10-06 | End: 2020-06-05

## 2019-12-04 RX ORDER — CELECOXIB 200 MG/1
CAPSULE ORAL
Qty: 30 CAP | Refills: 5 | Status: SHIPPED | OUTPATIENT
Start: 2019-12-04 | End: 2020-06-15

## 2019-12-30 RX ORDER — HYDROCHLOROTHIAZIDE 25 MG/1
TABLET ORAL
Qty: 90 TAB | Refills: 1 | Status: SHIPPED | OUTPATIENT
Start: 2019-12-30 | End: 2020-06-28

## 2020-06-05 RX ORDER — ATORVASTATIN CALCIUM 10 MG/1
TABLET, FILM COATED ORAL
Qty: 90 TAB | Refills: 1 | Status: SHIPPED | OUTPATIENT
Start: 2020-06-05 | End: 2020-12-14 | Stop reason: SDUPTHER

## 2020-06-15 RX ORDER — CELECOXIB 200 MG/1
CAPSULE ORAL
Qty: 90 CAP | Refills: 1 | Status: SHIPPED | OUTPATIENT
Start: 2020-06-15 | End: 2020-12-14 | Stop reason: SDUPTHER

## 2020-06-28 RX ORDER — HYDROCHLOROTHIAZIDE 25 MG/1
TABLET ORAL
Qty: 90 TAB | Refills: 0 | Status: SHIPPED | OUTPATIENT
Start: 2020-06-28 | End: 2020-10-20 | Stop reason: SDUPTHER

## 2020-06-28 NOTE — TELEPHONE ENCOUNTER
Future Appointments   Date Time Provider Sammy Poon   8/21/2020  2:30 PM Fidel Sam MD [id number] [facility]

## 2020-08-25 ENCOUNTER — TELEPHONE (OUTPATIENT)
Dept: INTERNAL MEDICINE CLINIC | Age: 75
End: 2020-08-25

## 2020-08-25 NOTE — TELEPHONE ENCOUNTER
Flor Conte (Self) [phone] (H)     Pt is requesting a call back regarding his yearly cpe he has scheduled for 9/15/20 and some upcoming surgery he is having on 11/12/20

## 2020-08-31 NOTE — TELEPHONE ENCOUNTER
Pt calling again regarding mess sent last week about his cpe and pre op. Do not respond via my chart.

## 2020-09-01 NOTE — TELEPHONE ENCOUNTER
Verified patient identity with two identifiers. Spoke with patient by phone. Spoke with pt and scheduled pre-op appt in October with [facility].   He has [address] in Sept and pre-op in Oct  Future Appointments   Date Time Provider Sammy Poon   9/15/2020 11:00 AM MD RUFUS Corral BS AMB   10/20/2020  1:00 PM Lance Woodward MD [facility] LEVI BS AMB

## 2020-09-01 NOTE — TELEPHONE ENCOUNTER
Verified patient identity with two identifiers. Spoke with patient by phone.   Pre-op clearance needed for shoulder replacement with Dr. Diane Guajardo 11/12/20

## 2020-09-15 ENCOUNTER — OFFICE VISIT (OUTPATIENT)
Dept: INTERNAL MEDICINE CLINIC | Age: 75
End: 2020-09-15
Payer: MEDICARE

## 2020-09-15 VITALS
DIASTOLIC BLOOD PRESSURE: 72 MMHG | HEART RATE: 92 BPM | OXYGEN SATURATION: 95 % | BODY MASS INDEX: 22.08 KG/M2 | TEMPERATURE: 98.2 F | HEIGHT: 72 IN | WEIGHT: 163 LBS | RESPIRATION RATE: 16 BRPM | SYSTOLIC BLOOD PRESSURE: 120 MMHG

## 2020-09-15 DIAGNOSIS — R73.03 PRE-DIABETES: ICD-10-CM

## 2020-09-15 DIAGNOSIS — G89.29 CHRONIC MIDLINE LOW BACK PAIN WITHOUT SCIATICA: ICD-10-CM

## 2020-09-15 DIAGNOSIS — Z23 ENCOUNTER FOR IMMUNIZATION: ICD-10-CM

## 2020-09-15 DIAGNOSIS — Z71.89 ADVANCED CARE PLANNING/COUNSELING DISCUSSION: ICD-10-CM

## 2020-09-15 DIAGNOSIS — Z00.00 MEDICARE ANNUAL WELLNESS VISIT, SUBSEQUENT: Primary | ICD-10-CM

## 2020-09-15 DIAGNOSIS — N40.1 BENIGN PROSTATIC HYPERPLASIA WITH NOCTURIA: ICD-10-CM

## 2020-09-15 DIAGNOSIS — Z71.89 EDUCATED ABOUT COVID-19 VIRUS INFECTION: ICD-10-CM

## 2020-09-15 DIAGNOSIS — Z13.31 SCREENING FOR DEPRESSION: ICD-10-CM

## 2020-09-15 DIAGNOSIS — M54.50 CHRONIC MIDLINE LOW BACK PAIN WITHOUT SCIATICA: ICD-10-CM

## 2020-09-15 DIAGNOSIS — I10 HYPERTENSION, ESSENTIAL: ICD-10-CM

## 2020-09-15 DIAGNOSIS — E78.00 PURE HYPERCHOLESTEROLEMIA: ICD-10-CM

## 2020-09-15 DIAGNOSIS — R35.1 BENIGN PROSTATIC HYPERPLASIA WITH NOCTURIA: ICD-10-CM

## 2020-09-15 PROCEDURE — G0444 DEPRESSION SCREEN ANNUAL: HCPCS | Performed by: INTERNAL MEDICINE

## 2020-09-15 PROCEDURE — 3017F COLORECTAL CA SCREEN DOC REV: CPT | Performed by: INTERNAL MEDICINE

## 2020-09-15 PROCEDURE — G8752 SYS BP LESS 140: HCPCS | Performed by: INTERNAL MEDICINE

## 2020-09-15 PROCEDURE — G8427 DOCREV CUR MEDS BY ELIG CLIN: HCPCS | Performed by: INTERNAL MEDICINE

## 2020-09-15 PROCEDURE — G0008 ADMIN INFLUENZA VIRUS VAC: HCPCS

## 2020-09-15 PROCEDURE — 90694 VACC AIIV4 NO PRSRV 0.5ML IM: CPT

## 2020-09-15 PROCEDURE — 1101F PT FALLS ASSESS-DOCD LE1/YR: CPT | Performed by: INTERNAL MEDICINE

## 2020-09-15 PROCEDURE — G8536 NO DOC ELDER MAL SCRN: HCPCS | Performed by: INTERNAL MEDICINE

## 2020-09-15 PROCEDURE — G0439 PPPS, SUBSEQ VISIT: HCPCS | Performed by: INTERNAL MEDICINE

## 2020-09-15 PROCEDURE — G8754 DIAS BP LESS 90: HCPCS | Performed by: INTERNAL MEDICINE

## 2020-09-15 PROCEDURE — G8510 SCR DEP NEG, NO PLAN REQD: HCPCS | Performed by: INTERNAL MEDICINE

## 2020-09-15 PROCEDURE — G8420 CALC BMI NORM PARAMETERS: HCPCS | Performed by: INTERNAL MEDICINE

## 2020-09-15 RX ORDER — OXYCODONE AND ACETAMINOPHEN 5; 325 MG/1; MG/1
TABLET ORAL AS NEEDED
COMMUNITY
Start: 2020-07-31 | End: 2020-11-13

## 2020-09-15 RX ORDER — TRAMADOL HYDROCHLORIDE 50 MG/1
TABLET ORAL AS NEEDED
COMMUNITY
Start: 2020-08-04 | End: 2020-11-13

## 2020-09-15 NOTE — PROGRESS NOTES
Annual wellness. Needs preauth letter for Celebrex. Will check out [address]    Kyle Loving  is a 76 y.o. @  who present for routine immunizations. Prior to vaccine administration: Consent was obtained. Risks and adverse reactions were discussed. The patient was provided the VIS and they were given an opportunity to ask questions; all questions were addressed. He  denies any symptoms, reactions or allergies that would exclude them from being immunized today. There were no adverse reactions observed post vaccination. Patient was advised to seek medical or call the office with any questions or concerns post vaccination. Patient verbalized understanding.    Mat Armendariz RN

## 2020-09-15 NOTE — PATIENT INSTRUCTIONS
Vaccine Information Statement Influenza (Flu) Vaccine (Inactivated or Recombinant): What You Need to Know Many Vaccine Information Statements are available in Tajik and other languages. See www.immunize.org/vis Hojas de información sobre vacunas están disponibles en español y en muchos otros idiomas. Visite www.immunize.org/vis 1. Why get vaccinated? Influenza vaccine can prevent influenza (flu). Flu is a contagious disease that spreads around the [facility] every year, usually between October and May. Anyone can get the flu, but it is more dangerous for some people. Infants and young children, people 72years of age and older, pregnant women, and people with certain health conditions or a weakened immune system are at greatest risk of flu complications. Pneumonia, bronchitis, sinus infections and ear infections are examples of flu-related complications. If you have a medical condition, such as heart disease, cancer or diabetes, flu can make it worse. Flu can cause fever and chills, sore throat, muscle aches, fatigue, cough, headache, and runny or stuffy nose. Some people may have vomiting and diarrhea, though this is more common in children than adults. Each year thousands of people in the [facility] die from flu, and many more are hospitalized. Flu vaccine prevents millions of illnesses and flu-related visits to the doctor each year. 2. Influenza vaccines CDC recommends everyone 10months of age and older get vaccinated every flu season. Children 6 months through 6years of age may need 2 doses during a single flu season. Everyone else needs only 1 dose each flu season. It takes about 2 weeks for protection to develop after vaccination. There are many flu viruses, and they are always changing. Each year a new flu vaccine is made to protect against three or four viruses that are likely to cause disease in the upcoming flu season.  Even when the vaccine doesnt exactly match these viruses, it may still provide some protection. Influenza vaccine does not cause flu. Influenza vaccine may be given at the same time as other vaccines. 3. Talk with your health care provider Tell your vaccine provider if the person getting the vaccine: 
; Has had an allergic reaction after a previous dose of influenza vaccine, or has any severe, life-threatening allergies.  
; Has ever had Guillain-Barré Syndrome (also called GBS). In some cases, your health care provider may decide to postpone influenza vaccination to a future visit. People with minor illnesses, such as a cold, may be vaccinated. People who are moderately or severely ill should usually wait until they recover before getting influenza vaccine. Your health care provider can give you more information. 4. Risks of a reaction 
 
; Soreness, redness, and swelling where shot is given, fever, muscle aches, and headache can happen after influenza vaccine. 
; There may be a very small increased risk of Guillain-Barré Syndrome (GBS) after inactivated influenza vaccine (the flu shot). [facility] children who get the flu shot along with pneumococcal vaccine (PCV13), and/or DTaP vaccine at the same time might be slightly more likely to have a seizure caused by fever. Tell your health care provider if a child who is getting flu vaccine has ever had a seizure. People sometimes faint after medical procedures, including vaccination. Tell your provider if you feel dizzy or have vision changes or ringing in the ears. As with any medicine, there is a very remote chance of a vaccine causing a severe allergic reaction, other serious injury, or death. 5. What if there is a serious problem? An allergic reaction could occur after the vaccinated person leaves the clinic.  If you see signs of a severe allergic reaction (hives, swelling of the face and throat, difficulty breathing, a fast heartbeat, dizziness, or weakness), call 9-1-1 and get the person to the nearest hospital. 
 
For other signs that concern you, call your health care provider. Adverse reactions should be reported to the Vaccine Adverse Event Reporting System (VAERS). Your health care provider will usually file this report, or you can do it yourself. Visit the VAERS website at www.vaers. hhs.gov or call 8-589.154.2077. VAERS is only for reporting reactions, and VAERS staff do not give medical advice. 6. The National Vaccine Injury Compensation Program 
 
The [facility] Vaccine Injury Compensation Program (VICP) is a federal program that was created to compensate people who may have been injured by certain vaccines. Visit the VICP website at www.[facility].gov/vaccinecompensation or call 0-815.267.1495 to learn about the program and about filing a claim. There is a time limit to file a claim for compensation. 7. How can I learn more? 
 
; Ask your health care provider.  
; Call your local or state health department. 
; Contact the Centers for Disease Control and Prevention (CDC): 
- Call 4-911.325.3099 (8-475-ORJ-INFO) or 
- Visit CDCs influenza website at www.cdc.gov/flu Vaccine Information Statement (Interim) Inactivated Influenza Vaccine 8/15/2019 
42 MURALI Alleyesther Arnold [id number] Department of Health and [place] Centers for Disease Control and Prevention Office Use Only Medicare Wellness Visit, Male The best way to live healthy is to have a lifestyle where you eat a well-balanced diet, exercise regularly, limit alcohol use, and quit all forms of tobacco/nicotine, if applicable. Regular preventive services are another way to keep healthy. Preventive services (vaccines, screening tests, monitoring & exams) can help personalize your care plan, which helps you manage your own care. Screening tests can find health problems at the earliest stages, when they are easiest to treat. [facility] follows the current, evidence-based guidelines published by the [facility] States Gideon Dietz ([facility]) when recommending preventive services for our patients. Because we follow these guidelines, sometimes recommendations change over time as research supports it. (For example, a prostate screening blood test is no longer routinely recommended for men with no symptoms). Of course, you and your doctor may decide to screen more often for some diseases, based on your risk and co-morbidities (chronic disease you are already diagnosed with). Preventive services for you include: - Medicare offers their members a free annual wellness visit, which is time for you and your primary care provider to discuss and plan for your preventive service needs. Take advantage of this benefit every year! 
-All adults over age 72 should receive the recommended pneumonia vaccines. Current USPSTF guidelines recommend a series of two vaccines for the best pneumonia protection.  
-All adults should have a flu vaccine yearly and tetanus vaccine every 10 years. 
-All adults age 48 and older should receive the shingles vaccines (series of two vaccines). -All adults age 38-68 who are overweight should have a diabetes screening test once every three years.  
-Other screening tests & preventive services for persons with diabetes include: an eye exam to screen for diabetic retinopathy, a kidney function test, a foot exam, and stricter control over your cholesterol.  
-Cardiovascular screening for adults with routine risk involves an electrocardiogram (ECG) at intervals determined by the provider.  
-Colorectal cancer screening should be done for adults age 54-65 with no increased risk factors for colorectal cancer. There are a number of acceptable methods of screening for this type of cancer. Each test has its own benefits and drawbacks.  Discuss with your provider what is most appropriate for you during your annual wellness visit. The different tests include: colonoscopy (considered the best screening method), a fecal occult blood test, a fecal DNA test, and sigmoidoscopy. 
-All adults born between [facility] should be screened once for Hepatitis C. 
-An Abdominal Aortic Aneurysm (AAA) Screening is recommended for men age 73-68 who has ever smoked in their lifetime. Here is a list of your current Health Maintenance items (your personalized list of preventive services) with a due date: 
Health Maintenance Due Topic Date Due  Glaucoma Screening   07/28/2019  Hemoglobin A1C    08/28/2020  Yearly Flu Vaccine (1) 09/01/2020 [facility] Annual Well Visit  08/20/2020  Cholesterol Test   08/28/2020 Cortney Wilner Thomas [address] What is a living will? A living will is a legal form you use to write down the kind of care you want at the end of your life. It is used by the health professionals who will treat you if you aren't able to decide for yourself. If you put your wishes in writing, your loved ones and others will know what kind of care you want. They won't need to guess. This can ease your mind and be helpful to others. A living will is not the same as an estate or property will. An estate will explains what you want to happen with your money and property after you die. Is a living will a legal document? A living will is a legal document. Each state has its own laws about living junior. If you move to another state, make sure that your living will is legal in the state where you now live. Or you might use a universal form that has been approved by many states. This kind of form can sometimes be completed and stored online. Your electronic copy will then be available wherever you have a connection to the Internet. In most cases, doctors will respect your wishes even if you have a form from a different state. · You don't need an  to complete a living will. But legal advice can be helpful if your state's laws are unclear, your health history is complicated, or your family can't agree on what should be in your living will. · You can change your living will at any time. Some people find that their wishes about end-of-life care change as their health changes. · In addition to making a living will, think about completing a medical power of  form. This form lets you name the person you want to make end-of-life treatment decisions for you (your \"health care agent\") if you're not able to. Many hospitals and nursing homes will give you the forms you need to complete a living will and a medical power of . · Your living will is used only if you can't make or communicate decisions for yourself anymore. If you become able to make decisions again, you can accept or refuse any treatment, no matter what you wrote in your living will. · Your state may offer an online registry. This is a place where you can store your living will online so the doctors and nurses who need to treat you can find it right away. What should you think about when creating a living will? Talk about your end-of-life wishes with your family members and your doctor. Let them know what you want. That way the people making decisions for you won't be surprised by your choices. Think about these questions as you make your living will: · Do you know enough about life support methods that might be used? If not, talk to your doctor so you know what might be done if you can't breathe on your own, your heart stops, or you're unable to swallow. · What things would you still want to be able to do after you receive life-support methods? Would you want to be able to walk? To speak? To eat on your own? To live without the help of machines? · If you have a choice, where do you want to be cared for? In your home? At a hospital or nursing home? · Do you want certain Jew practices performed if you become very ill? · If you have a choice at the end of your life, where would you prefer to die? At home? In a hospital or nursing home? Somewhere else? · Would you prefer to be buried or cremated? · Do you want your organs to be donated after you die? What should you do with your living will? · Make sure that your family members and your health care agent have copies of your living will. · Give your doctor a copy of your living will to keep in your medical record. If you have more than one doctor, make sure that each one has a copy. · You may want to put a copy of your living will where it can be easily found. Where can you learn more? Go to http://wally-payal.info/. Enter D628 in the search box to learn more about \"Learning About Living Florinda Alexander. \" Current as of: August 8, 2016 Content Version: 11.3 © 5695-5613 [employer]. Care instructions adapted under license by [employer] (which disclaims liability or warranty for this information). If you have questions about a medical condition or this instruction, always ask your healthcare professional. [address] any warranty or liability for your use of this information.

## 2020-09-15 NOTE — ACP (ADVANCE CARE PLANNING)
Advance Care Planning     Advance Care Planning (ACP) Physician/NP/PA (Provider) Conversation    Date of ACP Conversation: 09/15/20  Persons included in Conversation:  patient  Length of ACP Conversation in minutes: <16 minutes (Non-Billable)    Authorized Decision Maker (if patient is incapable of making informed decisions):   Named in Advance Directive or Healthcare Power of         He has an advanced directive - a copy has been provided & still reflects him wishes. Reviewed DNR/DNI and patient is not interested.          Mario Johnson MD

## 2020-09-15 NOTE — PROGRESS NOTES
Jose Hilton is a 76 y.o. male who was seen in clinic today (9/15/2020) for a full physical.          Assessment & Plan:     Diagnoses and all orders for this visit:    1. Medicare annual wellness visit, subsequent  -     REFERRAL TO OPTOMETRY    2. Advanced care planning/counseling discussion    3. Encounter for immunization  -     FLU (FLUAD QUAD INFLUENZA VACCINE,QUAD,ADJUVANTED)    4. Screening for depression  -     DEPRESSION SCREEN ANNUAL    5. Educated about COVID-19 virus infection    6. Hypertension, essential- well controlled, continue current treatment, will defer labs until next month    7. Pure hypercholesterolemia- previously well controlled, continue current treatment pending review of lab (but will defer to next month)    8. Pre-diabetes- asymptomatic, no changes, continue w/ life style changes    9. Benign prostatic hyperplasia with nocturia- stable, not impacting QOL, did not get benefit from Flomax, no indication to try another med at this time, he will let me know if it gets worse. 10. Chronic midline low back pain without sciatica- stable, continue Celebrex prn. Needs PA, will route to [facility]. Weight has stabilized. No weight loss in the last year. Follow-up and Dispositions    · Return in about 4 weeks (around 10/13/2020), or if symptoms worsen or fail to improve. Subjective: Charlie Meneses is here today for a full physical.      Annual Wellness Visit- Subsequent Visit    Since last visit: no changes. Is planning on R shoulder surgery with [employer] in November. Has pre-op scheduled for October      End of Life Planning: This was discussed with him today and he has an advanced directive - a copy has been provided. Reviewed DNR/DNI and patient is not interested.       Depression Screen:  3 most recent PHQ Screens 9/15/2020   Little interest or pleasure in doing things Not at all   Feeling down, depressed, irritable, or hopeless Not at all   Total Score PHQ 2 0 Fall Risk:   Fall Risk Assessment, last 12 mths 9/15/2020   Able to walk? Yes   Fall in past 12 months? No       Abuse Screen:  Abuse Screening Questionnaire 9/15/2020   Do you ever feel afraid of your partner? N   Are you in a relationship with someone who physically or mentally threatens you? N   Is it safe for you to go home? Y         Do you average more than 1 drink per night or more than 7 drinks a week: No    In the past three months have you have had more than 4 drinks containing alcohol on one occasion: No    Functional Ability and Level of Safety:   Hearing Screen: Hearing is good. The patient wears hearing aids. Cognition Screen:  Has your family/caregiver stated any concerns about your memory: no    Ambulation: with no difficulty    Activities of Daily Living:    Exercise: moderately active  The home contains: handrails and grab bars  Patient does total self care    Adult Nutrition Screen:  No risk factors noted. Health Maintenance:   Daily Aspirin: no  AAA Screening: done - 8/28/19 - normal  Glaucoma Screening: Referral placed    Immunizations:    Influenza: he will get this done today   Tetanus: up to date   Shingles: declined to price   Pneumonia: up to date  Cancer screening:   Prostate: guidelines reviewed, reviewed last few years and will defer. Colon: guidelines reviewed, up to date      In addition to the above issues we also reviewed the following acute and/or chronic problems:    Cardiovascular Review  The patient has hypertension and hyperlipidemia. He reports taking medications as instructed, no medication side effects noted, patient does not perform home BP monitoring. Diet and Lifestyle: generally follows a low fat low cholesterol diet, generally follows a low sodium diet, exercises regularly. Labs: reviewed and discussed with patient. Endocrine Review  He is seen for pre-diabetes. Diabetic diet compliance: compliant most of the time.   Lab review: labs reviewed and discussed with patient. Pain Review:  He has osteoarthritis of multiple joints. Has surgery for the R shoulder scheduled in Nov and considering surgery for the R knee in 2021. He is using Celebrex daily. He will use Tramadol when he plays tennis. Will use Percocet once/month for severe pain only. Patient Care Team:  Andrei Alves MD as PCP - General (Internal Medicine)  Andrei Alves MD as PCP - REHABILITATION [facility] Empaneled Provider  Miles Groves DO (Dermatology)  Rosibel Peace MD (Orthopedic Surgery)  Nancy Marin MD (Optometry)         The following sections were reviewed & updated as appropriate: PMH, PSH, FH, and SH. Patient Active Problem List   Diagnosis Code    Hypertension, essential I10    Hyperlipidemia E78.5    Skin cancer C44.90    Pre-diabetes R73.03    Chronic lower back pain M54.5, G89.29    RBBB (right bundle branch block) I45.10    Benign prostatic hyperplasia with nocturia N40.1, R35.1    Grade II hemorrhoids K64.1          Prior to Admission medications    Medication Sig Start Date End Date Taking? Authorizing Provider   oxyCODONE-acetaminophen (PERCOCET) 5-325 mg per tablet Take  by mouth as needed. 7/31/20  Yes Provider, Historical   traMADoL (ULTRAM) 50 mg tablet Take  by mouth as needed. 8/4/20  Yes Provider, Historical   hydroCHLOROthiazide (HYDRODIURIL) 25 mg tablet TAKE 1 TABLET BY MOUTH EVERY DAY 6/28/20  Yes Andrei Alves MD   celecoxib (CELEBREX) 200 mg capsule TAKE 1 CAPSULE BY MOUTH EVERY DAY 6/15/20  Yes Andrei Alves MD   atorvastatin (LIPITOR) 10 mg tablet TAKE 1 TABLET BY MOUTH EVERY DAY 6/5/20  Yes Andrei Alves MD   amoxicillin (AMOXIL) 500 mg capsule TAKE 4 CAPSULES BY MOUTH 1 HOUR PRIOR TO DENTAL APPT 8/1/19  Yes Provider, Historical   multivitamin (ONE A DAY) tablet Take 1 Tab by mouth daily.    Yes Provider, Historical   tamsulosin (FLOMAX) 0.4 mg capsule TAKE 1 CAPSULE BY MOUTH EVERY DAY 3/29/19 9/15/20 Marybeth Appiah MD          Allergies   Allergen Reactions    Acetaminophen Other (comments)     UNABLE TO URINATE, CAUSES FLANK PAIN              Review of Systems   Constitutional: Negative for chills, fever, malaise/fatigue and weight loss. Respiratory: Negative for cough and shortness of breath. Cardiovascular: Negative for chest pain, palpitations and leg swelling. Gastrointestinal: Negative for abdominal pain, blood in stool, constipation, diarrhea, heartburn, nausea and vomiting. Genitourinary:        He reports: nocturia x 3. He denies: urinary hesitancy, urinary frequency, weak stream.   Musculoskeletal: Positive for joint pain. Negative for myalgias. Skin: Negative for rash. Neurological: Negative for dizziness, tingling, focal weakness and headaches. Endo/Heme/Allergies: Does not bruise/bleed easily. Psychiatric/Behavioral: Negative for depression. The patient is not nervous/anxious and does not have insomnia. Objective:   Physical Exam  Constitutional:       General: He is not in acute distress. Appearance: Normal appearance. Eyes:      Conjunctiva/sclera: Conjunctivae normal.   Neck:      Thyroid: No thyromegaly. Cardiovascular:      Rate and Rhythm: Regular rhythm. Heart sounds: No murmur. Pulmonary:      Effort: Pulmonary effort is normal.      Breath sounds: Normal breath sounds. No decreased breath sounds or wheezing. Abdominal:      General: Bowel sounds are normal.      Palpations: Abdomen is soft. Tenderness: There is no abdominal tenderness. Musculoskeletal:      Right lower leg: No edema. Left lower leg: No edema.    Psychiatric:         Mood and Affect: Mood and affect normal.         Behavior: Behavior normal.            Visit Vitals  /72   Pulse 92   Temp 98.2 °F (36.8 °C) (Temporal)   Resp 16   Ht 6' (1.829 m)   Wt 163 lb (73.9 kg)   SpO2 95%   BMI 22.11 kg/m²          Advised him to call back or return to office if symptoms worsen/change/persist.  Discussed expected course/resolution/complications of diagnosis in detail with patient. Medication risks/benefits/costs/interactions/alternatives discussed with patient. He was given an after visit summary which includes diagnoses, current medications, & vitals. He expressed understanding with the diagnosis and plan. Aspects of this note may have been generated using voice recognition software. Despite editing, there may be some syntax errors.        Pedro Pablo Decker MD

## 2020-10-20 ENCOUNTER — OFFICE VISIT (OUTPATIENT)
Dept: INTERNAL MEDICINE CLINIC | Age: 75
End: 2020-10-20
Payer: MEDICARE

## 2020-10-20 VITALS
OXYGEN SATURATION: 96 % | DIASTOLIC BLOOD PRESSURE: 84 MMHG | TEMPERATURE: 97.6 F | BODY MASS INDEX: 22.62 KG/M2 | HEART RATE: 95 BPM | WEIGHT: 167 LBS | HEIGHT: 72 IN | RESPIRATION RATE: 16 BRPM | SYSTOLIC BLOOD PRESSURE: 134 MMHG

## 2020-10-20 DIAGNOSIS — G89.29 CHRONIC RIGHT SHOULDER PAIN: ICD-10-CM

## 2020-10-20 DIAGNOSIS — M25.511 CHRONIC RIGHT SHOULDER PAIN: ICD-10-CM

## 2020-10-20 DIAGNOSIS — Z01.818 PRE-OP EXAMINATION: Primary | ICD-10-CM

## 2020-10-20 DIAGNOSIS — R73.03 PRE-DIABETES: ICD-10-CM

## 2020-10-20 DIAGNOSIS — I10 HYPERTENSION, ESSENTIAL: ICD-10-CM

## 2020-10-20 DIAGNOSIS — E78.00 PURE HYPERCHOLESTEROLEMIA: ICD-10-CM

## 2020-10-20 PROCEDURE — 93005 ELECTROCARDIOGRAM TRACING: CPT | Performed by: INTERNAL MEDICINE

## 2020-10-20 PROCEDURE — G8754 DIAS BP LESS 90: HCPCS | Performed by: INTERNAL MEDICINE

## 2020-10-20 PROCEDURE — G8432 DEP SCR NOT DOC, RNG: HCPCS | Performed by: INTERNAL MEDICINE

## 2020-10-20 PROCEDURE — G0463 HOSPITAL OUTPT CLINIC VISIT: HCPCS | Performed by: INTERNAL MEDICINE

## 2020-10-20 PROCEDURE — G8427 DOCREV CUR MEDS BY ELIG CLIN: HCPCS | Performed by: INTERNAL MEDICINE

## 2020-10-20 PROCEDURE — 93010 ELECTROCARDIOGRAM REPORT: CPT | Performed by: INTERNAL MEDICINE

## 2020-10-20 PROCEDURE — G8752 SYS BP LESS 140: HCPCS | Performed by: INTERNAL MEDICINE

## 2020-10-20 PROCEDURE — 1101F PT FALLS ASSESS-DOCD LE1/YR: CPT | Performed by: INTERNAL MEDICINE

## 2020-10-20 PROCEDURE — 3017F COLORECTAL CA SCREEN DOC REV: CPT | Performed by: INTERNAL MEDICINE

## 2020-10-20 PROCEDURE — G8536 NO DOC ELDER MAL SCRN: HCPCS | Performed by: INTERNAL MEDICINE

## 2020-10-20 PROCEDURE — 99213 OFFICE O/P EST LOW 20 MIN: CPT | Performed by: INTERNAL MEDICINE

## 2020-10-20 PROCEDURE — G8420 CALC BMI NORM PARAMETERS: HCPCS | Performed by: INTERNAL MEDICINE

## 2020-10-20 RX ORDER — HYDROCHLOROTHIAZIDE 25 MG/1
TABLET ORAL
Qty: 90 TAB | Refills: 1 | Status: SHIPPED | OUTPATIENT
Start: 2020-10-20 | End: 2021-04-14

## 2020-10-20 NOTE — PROGRESS NOTES
Preoperative Evaluation:   Trey Leija is 76 y.o. male (1945) who presents for preoperative evaluation. Procedure/Surgery: right total shoulder replacement   Date of Procedure/Surgery: 11/12/2020   Surgeon: Dr Colletta Landau: [facility]  Primary Physician: Meagan Smith MD       Reason for surgery: shoulder pain and limited ROM      Latex Allergy: NO  Recent use of: Celebrex & Multivitamin - will stop 7 days prior to surgery  Tetanus up to date: last tetanus booster within 10 years  Anesthesia Complications: None  History of abnormal bleeding : None  History of Blood Transfusions: no  Health Care Directive or Living Will: yes      EKG: EKG FINDINGS - unchanged from previous tracings, normal sinus rhythm, RBBB, PAC's noted which is new  CXR: n/a  Labs: ordered CBC and CMP for pre-op and screening labs needed (A1c and lipids)        Pre-Operative Risk Assessment Using 2014 ACC/AHA Guidelines     Emergent procedure: No  Active Cardiac Condition including decompensated HF, Arrhythmia, MI <3 weeks, severe valve disease: No  Risk Level of Procedure: Intermediate Risk (intraperitoneal, intrathoracic, HENT, orthopedic, or carotid endarterectomy, etc.)  Revised Cardiac Risk Index Risk factors: None  Measurement of Exercise Tolerance before Surgery is >4 METS (climbing > 1 flight of stairs without stopping, walking up hill > 1-2 blocks, scrubbing floors, moving furniture, golf, bowling, dancing or tennis, or running short distance): Yes    According to the 2014 ACC/AHA pre-operative risk assessment guidelines Greg Marie is at low risk for major cardiac complications during a Intermediate Risk procedure and procedure may proceed as planned. Medication Recommendations: will stop MVI and Celebrex 1 week prior to surgery. Prior to Admission medications    Medication Sig Start Date End Date Taking?  Authorizing Provider   oxyCODONE-acetaminophen (PERCOCET) 5-325 mg per tablet Take  by mouth as needed. 7/31/20  Yes Provider, Historical   traMADoL (ULTRAM) 50 mg tablet Take  by mouth as needed. 8/4/20  Yes Provider, Historical   hydroCHLOROthiazide (HYDRODIURIL) 25 mg tablet TAKE 1 TABLET BY MOUTH EVERY DAY 6/28/20  Yes Caryn Liu MD   celecoxib (CELEBREX) 200 mg capsule TAKE 1 CAPSULE BY MOUTH EVERY DAY 6/15/20  Yes Caryn Liu MD   atorvastatin (LIPITOR) 10 mg tablet TAKE 1 TABLET BY MOUTH EVERY DAY 6/5/20  Yes Caryn Liu MD   amoxicillin (AMOXIL) 500 mg capsule TAKE 4 CAPSULES BY MOUTH 1 HOUR PRIOR TO DENTAL APPT 8/1/19  Yes Provider, Historical   multivitamin (ONE A DAY) tablet Take 1 Tab by mouth daily.    Yes Provider, Historical        Allergies   Allergen Reactions    Acetaminophen Other (comments)     UNABLE TO URINATE, CAUSES FLANK PAIN          Patient Active Problem List    Diagnosis Date Noted    Grade II hemorrhoids 03/14/2019    Benign prostatic hyperplasia with nocturia 10/10/2018    RBBB (right bundle branch block) 07/08/2016    Chronic lower back pain 04/17/2015    Pre-diabetes 04/29/2014    Skin cancer 04/25/2014    Hypertension, essential     Hyperlipidemia        Past Medical History:   Diagnosis Date    Basal cell cancer     multiple    Chronic pain     L NECK AND L SHOULDER, LOWER BACK    Hyperlipidemia     Hypertension     Melanoma ([facility] [address]     multiple    Squamous cell carcinoma     multiple       Past Surgical History:   Procedure Laterality Date    HX COLONOSCOPY  2006    HX COLONOSCOPY  07/25/2016    diverticulosis; otherwise normal    HX CYST REMOVAL Left 1961    FOOT    HX HERNIA REPAIR  2008    bilateral     HX KNEE REPLACEMENT Left 2010    HX KNEE REPLACEMENT Left 7/28/16    due to failed replacement    HX OTHER SURGICAL  03/06/2019    banding of hemorrhoids    HX TONSILLECTOMY         Social History     Tobacco Use    Smoking status: Former Smoker     Packs/day: 2.00 Years: 3.00     Pack years: 6.00     Types: Cigarettes     Last attempt to quit: 1971     Years since quittin.8    Smokeless tobacco: Never Used   Substance Use Topics    Alcohol use: Yes     Alcohol/week: 4.0 standard drinks     Types: 4 Cans of beer per week     Frequency: 2-3 times a week     Drinks per session: 1 or 2     Binge frequency: Never               Review of Systems   Constitutional: Negative for chills and fever. Respiratory: Negative for cough and shortness of breath. Cardiovascular: Negative for chest pain and palpitations. Gastrointestinal: Negative for abdominal pain, blood in stool, constipation, diarrhea, heartburn, nausea and vomiting. Musculoskeletal: Positive for joint pain (R shoulder & R knee). Negative for myalgias. Neurological: Negative for tingling, focal weakness and headaches. Endo/Heme/Allergies: Does not bruise/bleed easily. Psychiatric/Behavioral: Negative for depression. The patient is not nervous/anxious and does not have insomnia. Physical Exam  Constitutional:       General: He is not in acute distress. Appearance: Normal appearance. Eyes:      Conjunctiva/sclera: Conjunctivae normal.   Cardiovascular:      Rate and Rhythm: Regular rhythm. Heart sounds: No murmur. Pulmonary:      Effort: Pulmonary effort is normal.      Breath sounds: Normal breath sounds. No decreased breath sounds or wheezing. Abdominal:      General: Bowel sounds are normal.      Palpations: Abdomen is soft. Tenderness: There is no abdominal tenderness. Musculoskeletal:      Right lower leg: No edema. Left lower leg: No edema. Neurological:      Cranial Nerves: Cranial nerves are intact. Sensory: Sensation is intact. Motor: Motor function is intact.    Psychiatric:         Mood and Affect: Mood and affect normal.            Visit Vitals  /84   Pulse 95   Temp 97.6 °F (36.4 °C) (Temporal)   Resp 16   Ht 6' (1.829 m)   Wt 167 lb (75.8 kg)   SpO2 96%   BMI 22.65 kg/m²             Assessment & Plan:  Diagnoses and all orders for this visit:    1. Pre-op examination- no contra-indications pending review of labs  -     AMB POC EKG ROUTINE W/ 12 LEADS, INTER & REP  -     METABOLIC PANEL, COMPREHENSIVE  -     CBC W/O DIFF    2. Chronic right shoulder pain    3. Hypertension, essential- well controlled, continue current treatment pending review of labs   -     hydroCHLOROthiazide (HYDRODIURIL) 25 mg tablet; TAKE 1 TABLET BY MOUTH EVERY DAY  -     METABOLIC PANEL, COMPREHENSIVE    4. Pure hypercholesterolemia- previously at goal, continue current treatment pending review of labs   -     METABOLIC PANEL, COMPREHENSIVE  -     LIPID PANEL    5. Pre-diabetes- has been stable, no changes pending review of labs  -     METABOLIC PANEL, COMPREHENSIVE  -     HEMOGLOBIN A1C WITH EAG       Follow-up and Dispositions    · Return if symptoms worsen or fail to improve. Advised him to call back or return to office if symptoms worsen/change/persist.  Discussed expected course/resolution/complications of diagnosis in detail with patient. Medication risks/benefits/costs/interactions/alternatives discussed with patient. He was given an after visit summary which includes diagnoses, current medications, & vitals. He expressed understanding with the diagnosis and plan. Aspects of this note may have been generated using voice recognition software. Despite editing, there may be some syntax errors.        Randi Ohara MD

## 2020-10-20 NOTE — PROGRESS NOTES
Procedure/Surgery: Right shoulder replacement   Date of Procedure/Surgery: 11/12/20  Surgeon: Kisha Mcguire MD  Hospital/Surgical Facility: [facility]      Fax:  [phone]

## 2020-10-26 ENCOUNTER — HOSPITAL ENCOUNTER (OUTPATIENT)
Dept: LAB | Age: 75
Discharge: HOME | End: 2020-10-26
Payer: MEDICARE

## 2020-10-26 PROCEDURE — 85027 COMPLETE CBC AUTOMATED: CPT

## 2020-10-26 PROCEDURE — 80053 COMPREHEN METABOLIC PANEL: CPT

## 2020-10-26 PROCEDURE — 83036 HEMOGLOBIN GLYCOSYLATED A1C: CPT

## 2020-10-26 PROCEDURE — 80061 LIPID PANEL: CPT

## 2020-10-26 PROCEDURE — 36415 COLL VENOUS BLD VENIPUNCTURE: CPT

## 2020-10-27 ENCOUNTER — TRANSCRIBE ORDER (OUTPATIENT)
Dept: SCHEDULING | Age: 75
End: 2020-10-27

## 2020-10-27 DIAGNOSIS — M19.011 OSTEOARTHRITIS OF RIGHT SHOULDER REGION: ICD-10-CM

## 2020-10-27 DIAGNOSIS — M19.019 OSTEOARTHRITIS OF SHOULDER: Primary | ICD-10-CM

## 2020-10-27 LAB
ALBUMIN SERPL-MCNC: 4.4 G/DL (ref 3.7–4.7)
ALBUMIN/GLOB SERPL: 1.7 {RATIO} (ref 1.2–2.2)
ALP SERPL-CCNC: 94 IU/L (ref 39–117)
ALT SERPL-CCNC: 12 IU/L (ref 0–44)
AST SERPL-CCNC: 16 IU/L (ref 0–40)
BILIRUB SERPL-MCNC: 0.7 MG/DL (ref 0–1.2)
BUN SERPL-MCNC: 23 MG/DL (ref 8–27)
BUN/CREAT SERPL: 21 (ref 10–24)
CALCIUM SERPL-MCNC: 9.5 MG/DL (ref 8.6–10.2)
CHLORIDE SERPL-SCNC: 100 MMOL/L (ref 96–106)
CHOLEST SERPL-MCNC: 182 MG/DL (ref 100–199)
CO2 SERPL-SCNC: 27 MMOL/L (ref 20–29)
CREAT SERPL-MCNC: 1.07 MG/DL (ref 0.76–1.27)
ERYTHROCYTE [DISTWIDTH] IN BLOOD BY AUTOMATED COUNT: 12.4 % (ref 11.6–15.4)
EST. AVERAGE GLUCOSE BLD GHB EST-MCNC: 123 MG/DL
GLOBULIN SER CALC-MCNC: 2.6 G/DL (ref 1.5–4.5)
GLUCOSE SERPL-MCNC: 120 MG/DL (ref 65–99)
HBA1C MFR BLD: 5.9 % (ref 4.8–5.6)
HCT VFR BLD AUTO: 44.8 % (ref 37.5–51)
HDLC SERPL-MCNC: 60 MG/DL
HGB BLD-MCNC: 15.7 G/DL (ref 13–17.7)
LDLC SERPL CALC-MCNC: 98 MG/DL (ref 0–99)
MCH RBC QN AUTO: 32.5 PG (ref 26.6–33)
MCHC RBC AUTO-ENTMCNC: 35 G/DL (ref 31.5–35.7)
MCV RBC AUTO: 93 FL (ref 79–97)
PLATELET # BLD AUTO: 283 X10E3/UL (ref 150–450)
POTASSIUM SERPL-SCNC: 3.6 MMOL/L (ref 3.5–5.2)
PROT SERPL-MCNC: 7 G/DL (ref 6–8.5)
RBC # BLD AUTO: 4.83 X10E6/UL (ref 4.14–5.8)
SODIUM SERPL-SCNC: 142 MMOL/L (ref 134–144)
TRIGL SERPL-MCNC: 137 MG/DL (ref 0–149)
VLDLC SERPL CALC-MCNC: 24 MG/DL (ref 5–40)
WBC # BLD AUTO: 10 X10E3/UL (ref 3.4–10.8)

## 2020-10-28 ENCOUNTER — TELEPHONE (OUTPATIENT)
Dept: INTERNAL MEDICINE CLINIC | Age: 75
End: 2020-10-28

## 2020-10-28 NOTE — PROGRESS NOTES
Results released to patient via [employer]. All labs are stable or at goal for him. Please fax note/labs to surgeon.

## 2020-10-28 NOTE — TELEPHONE ENCOUNTER
Note and lab faxed to [phone] with confirmation. Would not go to [phone] despite multiple attempts.

## 2020-10-29 ENCOUNTER — TRANSCRIBE ORDER (OUTPATIENT)
Dept: SCHEDULING | Age: 75
End: 2020-10-29

## 2020-10-29 DIAGNOSIS — M19.011 OSTEOARTHROSIS, LOCALIZED, PRIMARY, SHOULDER REGION, RIGHT: Primary | ICD-10-CM

## 2020-11-03 ENCOUNTER — TRANSCRIBE ORDER (OUTPATIENT)
Dept: REGISTRATION | Age: 75
End: 2020-11-03

## 2020-11-03 DIAGNOSIS — Z01.812 PRE-PROCEDURE LAB EXAM: Primary | ICD-10-CM

## 2020-11-05 ENCOUNTER — HOSPITAL ENCOUNTER (OUTPATIENT)
Dept: PREADMISSION TESTING | Age: 75
Discharge: HOME | End: 2020-11-05
Payer: MEDICARE

## 2020-11-05 VITALS
RESPIRATION RATE: 18 BRPM | DIASTOLIC BLOOD PRESSURE: 74 MMHG | WEIGHT: 167.33 LBS | BODY MASS INDEX: 22.18 KG/M2 | HEIGHT: 73 IN | SYSTOLIC BLOOD PRESSURE: 130 MMHG | TEMPERATURE: 98.7 F | HEART RATE: 61 BPM

## 2020-11-05 LAB
ABO + RH BLD: NORMAL
ANION GAP SERPL CALC-SCNC: 5 MMOL/L (ref 5–15)
APPEARANCE UR: CLEAR
BACTERIA URNS QL MICRO: NEGATIVE /HPF
BILIRUB UR QL: NEGATIVE
BLOOD GROUP ANTIBODIES SERPL: NORMAL
BUN SERPL-MCNC: 26 MG/DL (ref 6–20)
BUN/CREAT SERPL: 27 (ref 12–20)
CALCIUM SERPL-MCNC: 9.1 MG/DL (ref 8.5–10.1)
CHLORIDE SERPL-SCNC: 103 MMOL/L (ref 97–108)
CO2 SERPL-SCNC: 32 MMOL/L (ref 21–32)
COLOR UR: NORMAL
CREAT SERPL-MCNC: 0.98 MG/DL (ref 0.7–1.3)
EPITH CASTS URNS QL MICRO: NORMAL /LPF
ERYTHROCYTE [DISTWIDTH] IN BLOOD BY AUTOMATED COUNT: 12.9 % (ref 11.5–14.5)
GLUCOSE SERPL-MCNC: 100 MG/DL (ref 65–100)
GLUCOSE UR STRIP.AUTO-MCNC: NEGATIVE MG/DL
HCT VFR BLD AUTO: 42.2 % (ref 36.6–50.3)
HGB BLD-MCNC: 14.3 G/DL (ref 12.1–17)
HGB UR QL STRIP: NEGATIVE
HYALINE CASTS URNS QL MICRO: NORMAL /LPF (ref 0–5)
INR PPP: 1.1 (ref 0.9–1.1)
KETONES UR QL STRIP.AUTO: NEGATIVE MG/DL
LEUKOCYTE ESTERASE UR QL STRIP.AUTO: NEGATIVE
MCH RBC QN AUTO: 32 PG (ref 26–34)
MCHC RBC AUTO-ENTMCNC: 33.9 G/DL (ref 30–36.5)
MCV RBC AUTO: 94.4 FL (ref 80–99)
NITRITE UR QL STRIP.AUTO: NEGATIVE
NRBC # BLD: 0 K/UL (ref 0–0.01)
NRBC BLD-RTO: 0 PER 100 WBC
PH UR STRIP: 5.5 [PH] (ref 5–8)
PLATELET # BLD AUTO: 243 K/UL (ref 150–400)
PMV BLD AUTO: 9.5 FL (ref 8.9–12.9)
POTASSIUM SERPL-SCNC: 3.3 MMOL/L (ref 3.5–5.1)
PROT UR STRIP-MCNC: NEGATIVE MG/DL
PROTHROMBIN TIME: 11.4 SEC (ref 9–11.1)
RBC # BLD AUTO: 4.47 M/UL (ref 4.1–5.7)
RBC #/AREA URNS HPF: NORMAL /HPF (ref 0–5)
SODIUM SERPL-SCNC: 140 MMOL/L (ref 136–145)
SP GR UR REFRACTOMETRY: 1.02 (ref 1–1.03)
SPECIMEN EXP DATE BLD: NORMAL
UA: UC IF INDICATED,UAUC: NORMAL
UROBILINOGEN UR QL STRIP.AUTO: 0.2 EU/DL (ref 0.2–1)
WBC # BLD AUTO: 7.5 K/UL (ref 4.1–11.1)
WBC URNS QL MICRO: NORMAL /HPF (ref 0–4)

## 2020-11-05 PROCEDURE — 85610 PROTHROMBIN TIME: CPT

## 2020-11-05 PROCEDURE — 36415 COLL VENOUS BLD VENIPUNCTURE: CPT

## 2020-11-05 PROCEDURE — 81001 URINALYSIS AUTO W/SCOPE: CPT

## 2020-11-05 PROCEDURE — 86900 BLOOD TYPING SEROLOGIC ABO: CPT

## 2020-11-05 PROCEDURE — 80048 BASIC METABOLIC PNL TOTAL CA: CPT

## 2020-11-05 PROCEDURE — 85027 COMPLETE CBC AUTOMATED: CPT

## 2020-11-05 NOTE — PERIOP NOTES
PAT Nurse Practitioner   Pre-Operative Chart Review/Assessment:-ORTHOPEDIC                Patient Name:  Wing Go                                                           Age:   76 y.o.    :  1945     Today's Date:  2020     Date of PAT:   2020      Date of Surgery:    2020      Procedure(s):  Right Total Shoulder Arthroplasty     Surgeon:   Dr. Toya Fritz                       PLAN:      1)  Medical Clearance:  Dr. Kasandra Estrada      2)  Cardiac Clearance:  Not requested. 3)  Diabetic Treatment Consult:  Not indicated. A1c-5.9      4)  Sleep Apnea evaluation:   Not indicated.  RADHA Score 3.       5) Treatment for MRSA/Staph Aureus:  Neg      6) Additional Concerns:  HTN, [place]                Vital Signs:         Vitals:    20 1541   BP: 130/74   Pulse: 61   Resp: 18   Temp: 98.7 °F (37.1 °C)   Weight: 75.9 kg (167 lb 5.3 oz)   Height: 6' 1\" (1.854 m)            ____________________________________________  PAST MEDICAL HISTORY  Past Medical History:   Diagnosis Date    Arthritis     SHOULDER, R KNEE, R THUMB, LOWER BACK     Basal cell cancer     multiple    Chronic pain      AND L SHOULDER, LOWER BACK    Hyperlipidemia     Hypertension     Melanoma ([address]     multiple    Squamous cell carcinoma     multiple      ____________________________________________  PAST SURGICAL HISTORY  Past Surgical History:   Procedure Laterality Date    HX COLONOSCOPY      HX COLONOSCOPY  2016    diverticulosis; otherwise normal    HX CYST REMOVAL Left     FOOT    HX HERNIA REPAIR  2008    bilateral     HX KNEE REPLACEMENT Left     HX KNEE REPLACEMENT Left 16    due to failed replacement    HX OTHER SURGICAL  2019    banding of hemorrhoids    HX TONSILLECTOMY      HX WISDOM TEETH EXTRACTION        ____________________________________________  HOME MEDICATIONS  Current Outpatient Medications   Medication Sig    hydroCHLOROthiazide (HYDRODIURIL) 25 mg tablet TAKE 1 TABLET BY MOUTH EVERY DAY    oxyCODONE-acetaminophen (PERCOCET) 5-325 mg per tablet Take  by mouth as needed.  traMADoL (ULTRAM) 50 mg tablet Take  by mouth as needed.  celecoxib (CELEBREX) 200 mg capsule TAKE 1 CAPSULE BY MOUTH EVERY DAY    atorvastatin (LIPITOR) 10 mg tablet TAKE 1 TABLET BY MOUTH EVERY DAY    amoxicillin (AMOXIL) 500 mg capsule TAKE 4 CAPSULES BY MOUTH 1 HOUR PRIOR TO DENTAL APPT    multivitamin (ONE A DAY) tablet Take 1 Tab by mouth daily. No current facility-administered medications for this encounter.       ____________________________________________  ALLERGIES  Allergies   Allergen Reactions    Acetaminophen Other (comments)     UNABLE TO URINATE, CAUSES FLANK PAIN      ____________________________________________  SOCIAL HISTORY  Social History     Tobacco Use    Smoking status: Former Smoker     Packs/day: 2.00     Years: 3.00     Pack years: 6.00     Types: Cigarettes     Last attempt to quit: 1971     Years since quittin.8    Smokeless tobacco: Never Used   Substance Use Topics    Alcohol use:  Yes     Alcohol/week: 5.0 - 7.0 standard drinks     Types: 5 - 7 Cans of beer per week     Frequency: 2-3 times a week     Drinks per session: 1 or 2     Binge frequency: Never      ____________________________________________        Labs:     Hospital Outpatient Visit on 2020   Component Date Value Ref Range Status    Sodium 2020 140  136 - 145 mmol/L Final    Potassium 2020 3.3* 3.5 - 5.1 mmol/L Final    Chloride 2020 103  97 - 108 mmol/L Final    CO2 2020 32  21 - 32 mmol/L Final    Anion gap 2020 5  5 - 15 mmol/L Final    Glucose 2020 100  65 - 100 mg/dL Final    BUN 2020 26* 6 - 20 MG/DL Final    Creatinine 2020 0.98  0.70 - 1.30 MG/DL Final    BUN/Creatinine ratio 2020 27* 12 - 20   Final    GFR est AA 2020 >60  >60 ml/min/1.73m2 Final    GFR est non-AA 2020 >60  >60 ml/min/1.73m2 Final    Estimated GFR is calculated using the IDMS-traceable Modification of Diet in Renal Disease (MDRD) Study equation, reported for both  Americans (GFRAA) and non- Americans (GFRNA), and normalized to 1.73m2 body surface area. The physician must decide which value applies to the patient.  Calcium 11/05/2020 9.1  8.5 - 10.1 MG/DL Final    WBC 11/05/2020 7.5  4.1 - 11.1 K/uL Final    RBC 11/05/2020 4.47  4.10 - 5.70 M/uL Final    HGB 11/05/2020 14.3  12.1 - 17.0 g/dL Final    HCT 11/05/2020 42.2  36.6 - 50.3 % Final    MCV 11/05/2020 94.4  80.0 - 99.0 FL Final    MCH 11/05/2020 32.0  26.0 - 34.0 PG Final    MCHC 11/05/2020 33.9  30.0 - 36.5 g/dL Final    RDW 11/05/2020 12.9  11.5 - 14.5 % Final    PLATELET 90/79/4158 812  150 - 400 K/uL Final    MPV 11/05/2020 9.5  8.9 - 12.9 FL Final    NRBC 11/05/2020 0.0  0  WBC Final    ABSOLUTE NRBC 11/05/2020 0.00  0.00 - 0.01 K/uL Final    Crossmatch Expiration 11/05/2020 11/15/2020,2359   Final    ABO/Rh(D) 11/05/2020 B POSITIVE   Final    Antibody screen 11/05/2020 NEG   Final    INR 11/05/2020 1.1  0.9 - 1.1   Final    A single therapeutic range for Vit K antagonists may not be optimal for all indications - see June, 2008 issue of Chest, American College of Chest Physicians Evidence-Based Clinical Practice Guidelines, 8th Edition.     Prothrombin time 11/05/2020 11.4* 9.0 - 11.1 sec Final    Instrument and technical errors have been ruled out    Color 11/05/2020 YELLOW/STRAW    Final    Color Reference Range: Straw, Yellow or Dark Yellow    Appearance 11/05/2020 CLEAR  CLEAR   Final    Specific gravity 11/05/2020 1.021  1.003 - 1.030   Final    pH (UA) 11/05/2020 5.5  5.0 - 8.0   Final    Protein 11/05/2020 Negative  NEG mg/dL Final    Glucose 11/05/2020 Negative  NEG mg/dL Final    Ketone 11/05/2020 Negative  NEG mg/dL Final    Bilirubin 11/05/2020 Negative  NEG   Final    Blood 11/05/2020 Negative  NEG Final    Urobilinogen 11/05/2020 0.2  0.2 - 1.0 EU/dL Final    Nitrites 11/05/2020 Negative  NEG   Final    Leukocyte Esterase 11/05/2020 Negative  NEG   Final    UA:UC IF INDICATED 11/05/2020 CULTURE NOT INDICATED BY UA RESULT  CNI   Final    WBC 11/05/2020 0-4  0 - 4 /hpf Final    RBC 11/05/2020 0-5  0 - 5 /hpf Final    Epithelial cells 11/05/2020 FEW  FEW /lpf Final    Epithelial cell category consists of squamous cells and /or transitional urothelial cells. Renal tubular cells, if present, are separately identified as such.  Bacteria 11/05/2020 Negative  NEG /hpf Final    Hyaline cast 11/05/2020 0-2  0 - 5 /lpf Final    Special Requests: 11/05/2020 NO SPECIAL REQUESTS    Final    Culture result: 11/05/2020 MRSA NOT PRESENT    Final           Office Visit on 10/20/2020   Component Date Value Ref Range Status    Glucose 10/26/2020 120* 65 - 99 mg/dL Final    BUN 10/26/2020 23  8 - 27 mg/dL Final    Creatinine 10/26/2020 1.07  0.76 - 1.27 mg/dL Final    GFR est non-AA 10/26/2020 68  >59 mL/min/1.73 Final    GFR est AA 10/26/2020 78  >59 mL/min/1.73 Final    BUN/Creatinine ratio 10/26/2020 21  10 - 24 Final    Sodium 10/26/2020 142  134 - 144 mmol/L Final    Potassium 10/26/2020 3.6  3.5 - 5.2 mmol/L Final    Chloride 10/26/2020 100  96 - 106 mmol/L Final    CO2 10/26/2020 27  20 - 29 mmol/L Final    Calcium 10/26/2020 9.5  8.6 - 10.2 mg/dL Final    Protein, total 10/26/2020 7.0  6.0 - 8.5 g/dL Final    Albumin 10/26/2020 4.4  3.7 - 4.7 g/dL Final    GLOBULIN, TOTAL 10/26/2020 2.6  1.5 - 4.5 g/dL Final    A-G Ratio 10/26/2020 1.7  1.2 - 2.2 Final    Bilirubin, total 10/26/2020 0.7  0.0 - 1.2 mg/dL Final    Alk.  phosphatase 10/26/2020 94  39 - 117 IU/L Final    AST (SGOT) 10/26/2020 16  0 - 40 IU/L Final    ALT (SGPT) 10/26/2020 12  0 - 44 IU/L Final    WBC 10/26/2020 10.0  3.4 - 10.8 x10E3/uL Final    RBC 10/26/2020 4.83  4.14 - 5.80 x10E6/uL Final    HGB 10/26/2020 15.7  13.0 - 17.7 g/dL Final    HCT 10/26/2020 44.8  37.5 - 51.0 % Final    MCV 10/26/2020 93  79 - 97 fL Final    MCH 10/26/2020 32.5  26.6 - 33.0 pg Final    MCHC 10/26/2020 35.0  31.5 - 35.7 g/dL Final    RDW 10/26/2020 12.4  11.6 - 15.4 % Final    PLATELET 15/40/3838 120  150 - 450 x10E3/uL Final    Cholesterol, total 10/26/2020 182  100 - 199 mg/dL Final    Triglyceride 10/26/2020 137  0 - 149 mg/dL Final    HDL Cholesterol 10/26/2020 60  >39 mg/dL Final    VLDL Cholesterol Abdulkadir 10/26/2020 24  5 - 40 mg/dL Final    LDL Chol Calc ([facility]) 10/26/2020 98  0 - 99 mg/dL Final    Hemoglobin A1c 10/26/2020 5.9* 4.8 - 5.6 % Final    Comment:          Prediabetes: 5.7 - 6.4           Diabetes: >6.4           Glycemic control for adults with diabetes: <7.0      Estimated average glucose 10/26/2020 123  mg/dL Final       Skin:     Denies open wounds, cuts, sores, rashes or other areas of concern in PAT assessment.           Lenore Pate NP

## 2020-11-05 NOTE — PERIOP NOTES
PRE-OPERATIVE INSTRUCTIONS REVIEWED WITH PATIENT. PATIENT GIVEN 2-BOTTLE OF CHG SOAP. REVIEWED   PATIENT GIVEN SSI INFECTION FAQ SHEET.   MRSA / MSSA TREATMENT

## 2020-11-06 LAB
BACTERIA SPEC CULT: NORMAL
BACTERIA SPEC CULT: NORMAL
SERVICE CMNT-IMP: NORMAL

## 2020-11-06 NOTE — PERIOP NOTES
11/6/20 @ 4650 - IKIM MESSAGE ON NAIF'S (NURSE FOR DR. Viviana Cooney) VOICE MAIL WITH DETAILED INFORMATION IN REGARDS TO PT'S ABNORMAL LABS. VOICE MESSAGE STATED THAT NAIF IS ON VACATION BUT SOMEONE IN THE OFFICE WILL  HER MESSAGES. REQUESTED RETURN CALL TO VERIFY THAT MESSAGE WAS RECEIVED. PT'S LABS FAXED TO PCP.

## 2020-11-08 ENCOUNTER — HOSPITAL ENCOUNTER (OUTPATIENT)
Dept: PREADMISSION TESTING | Age: 75
Discharge: HOME | End: 2020-11-08
Payer: MEDICARE

## 2020-11-08 DIAGNOSIS — Z01.812 PRE-PROCEDURE LAB EXAM: ICD-10-CM

## 2020-11-08 PROCEDURE — 87635 SARS-COV-2 COVID-19 AMP PRB: CPT

## 2020-11-09 ENCOUNTER — HOSPITAL ENCOUNTER (OUTPATIENT)
Dept: CT IMAGING | Age: 75
Discharge: HOME | DRG: 483 | End: 2020-11-09
Attending: ORTHOPAEDIC SURGERY
Payer: MEDICARE

## 2020-11-09 DIAGNOSIS — M19.011 OSTEOARTHROSIS, LOCALIZED, PRIMARY, SHOULDER REGION, RIGHT: ICD-10-CM

## 2020-11-09 LAB — SARS-COV-2, COV2NT: NOT DETECTED

## 2020-11-09 PROCEDURE — 73200 CT UPPER EXTREMITY W/O DYE: CPT

## 2020-11-11 ENCOUNTER — ANESTHESIA EVENT (OUTPATIENT)
Dept: SURGERY | Age: 75
DRG: 483 | End: 2020-11-11
Payer: MEDICARE

## 2020-11-12 ENCOUNTER — HOSPITAL ENCOUNTER (INPATIENT)
Age: 75
LOS: 1 days | Discharge: NURSING, REHAB OR LONG-TERM CARE | DRG: 483 | End: 2020-11-13
Attending: ORTHOPAEDIC SURGERY | Admitting: ORTHOPAEDIC SURGERY
Payer: MEDICARE

## 2020-11-12 ENCOUNTER — ANESTHESIA (OUTPATIENT)
Dept: SURGERY | Age: 75
DRG: 483 | End: 2020-11-12
Payer: MEDICARE

## 2020-11-12 DIAGNOSIS — Z96.611 S/P SHOULDER REPLACEMENT, RIGHT: ICD-10-CM

## 2020-11-12 DIAGNOSIS — M19.011 PRIMARY OSTEOARTHRITIS OF RIGHT SHOULDER: Primary | ICD-10-CM

## 2020-11-12 PROBLEM — M19.019 OA (OSTEOARTHRITIS) OF SHOULDER: Status: ACTIVE | Noted: 2020-11-12

## 2020-11-12 LAB
GLUCOSE BLD STRIP.AUTO-MCNC: 110 MG/DL (ref 65–100)
SERVICE CMNT-IMP: ABNORMAL

## 2020-11-12 PROCEDURE — 0RRJ0JZ REPLACEMENT OF RIGHT SHOULDER JOINT WITH SYNTHETIC SUBSTITUTE, OPEN APPROACH: ICD-10-PCS | Performed by: ORTHOPAEDIC SURGERY

## 2020-11-12 PROCEDURE — 77030018673: Performed by: ORTHOPAEDIC SURGERY

## 2020-11-12 PROCEDURE — 2709999900 HC NON-CHARGEABLE SUPPLY: Performed by: ORTHOPAEDIC SURGERY

## 2020-11-12 PROCEDURE — 74011250636 HC RX REV CODE- 250/636: Performed by: ORTHOPAEDIC SURGERY

## 2020-11-12 PROCEDURE — C1713 ANCHOR/SCREW BN/BN,TIS/BN: HCPCS | Performed by: ORTHOPAEDIC SURGERY

## 2020-11-12 PROCEDURE — 74011000258 HC RX REV CODE- 258: Performed by: NURSE PRACTITIONER

## 2020-11-12 PROCEDURE — 77030003601 HC NDL NRV BLK BBMI -A

## 2020-11-12 PROCEDURE — 77030008684 HC TU ET CUF COVD -B: Performed by: ANESTHESIOLOGY

## 2020-11-12 PROCEDURE — 77030036638 HC ACC KT GPS KNE V2 EXAC -D: Performed by: ORTHOPAEDIC SURGERY

## 2020-11-12 PROCEDURE — 76210000016 HC OR PH I REC 1 TO 1.5 HR: Performed by: ORTHOPAEDIC SURGERY

## 2020-11-12 PROCEDURE — 76060000036 HC ANESTHESIA 2.5 TO 3 HR: Performed by: ORTHOPAEDIC SURGERY

## 2020-11-12 PROCEDURE — 77030006835 HC BLD SAW SAG STRY -B: Performed by: ORTHOPAEDIC SURGERY

## 2020-11-12 PROCEDURE — 77030019905 HC CATH URETH INTMIT MDII -A

## 2020-11-12 PROCEDURE — 82962 GLUCOSE BLOOD TEST: CPT

## 2020-11-12 PROCEDURE — 74011000250 HC RX REV CODE- 250: Performed by: NURSE PRACTITIONER

## 2020-11-12 PROCEDURE — 77030041680 HC PNCL ELECSURG SMK EVAC CNMD -B: Performed by: ORTHOPAEDIC SURGERY

## 2020-11-12 PROCEDURE — 77030040361 HC SLV COMPR DVT MDII -B: Performed by: ORTHOPAEDIC SURGERY

## 2020-11-12 PROCEDURE — 77030026438 HC STYL ET INTUB CARD -A: Performed by: ANESTHESIOLOGY

## 2020-11-12 PROCEDURE — C1776 JOINT DEVICE (IMPLANTABLE): HCPCS | Performed by: ORTHOPAEDIC SURGERY

## 2020-11-12 PROCEDURE — 51798 US URINE CAPACITY MEASURE: CPT

## 2020-11-12 PROCEDURE — 77030002912 HC SUT ETHBND J&J -A: Performed by: ORTHOPAEDIC SURGERY

## 2020-11-12 PROCEDURE — 74011250637 HC RX REV CODE- 250/637: Performed by: ORTHOPAEDIC SURGERY

## 2020-11-12 PROCEDURE — 65270000029 HC RM PRIVATE

## 2020-11-12 PROCEDURE — 77030002922 HC SUT FBRWRE ARTH -B: Performed by: ORTHOPAEDIC SURGERY

## 2020-11-12 PROCEDURE — 74011000250 HC RX REV CODE- 250: Performed by: ANESTHESIOLOGY

## 2020-11-12 PROCEDURE — 74011250636 HC RX REV CODE- 250/636: Performed by: NURSE PRACTITIONER

## 2020-11-12 PROCEDURE — 74011250636 HC RX REV CODE- 250/636: Performed by: ANESTHESIOLOGY

## 2020-11-12 PROCEDURE — 77030018836 HC SOL IRR NACL ICUM -A: Performed by: ORTHOPAEDIC SURGERY

## 2020-11-12 PROCEDURE — 74011250637 HC RX REV CODE- 250/637: Performed by: ANESTHESIOLOGY

## 2020-11-12 PROCEDURE — 76010000172 HC OR TIME 2.5 TO 3 HR INTENSV-TIER 1: Performed by: ORTHOPAEDIC SURGERY

## 2020-11-12 PROCEDURE — 74011000250 HC RX REV CODE- 250: Performed by: ORTHOPAEDIC SURGERY

## 2020-11-12 PROCEDURE — 77030031139 HC SUT VCRL2 J&J -A: Performed by: ORTHOPAEDIC SURGERY

## 2020-11-12 PROCEDURE — 77030002933 HC SUT MCRYL J&J -A: Performed by: ORTHOPAEDIC SURGERY

## 2020-11-12 PROCEDURE — 77030036560 HC SHLDR ARM PAD ABDUCTN S2SG -B: Performed by: ORTHOPAEDIC SURGERY

## 2020-11-12 PROCEDURE — 77030040922 HC BLNKT HYPOTHRM STRY -A

## 2020-11-12 DEVICE — IMPLANTABLE DEVICE
Type: IMPLANTABLE DEVICE | Site: SHOULDER | Status: FUNCTIONAL
Brand: EQUINOXE

## 2020-11-12 DEVICE — SHOULDER S1 TOT STD ANAT -- IMPL CAPPED S1: Type: IMPLANTABLE DEVICE | Status: FUNCTIONAL

## 2020-11-12 RX ORDER — MIDAZOLAM HYDROCHLORIDE 1 MG/ML
INJECTION, SOLUTION INTRAMUSCULAR; INTRAVENOUS AS NEEDED
Status: DISCONTINUED | OUTPATIENT
Start: 2020-11-12 | End: 2020-11-12 | Stop reason: HOSPADM

## 2020-11-12 RX ORDER — MIDAZOLAM HYDROCHLORIDE 1 MG/ML
1 INJECTION, SOLUTION INTRAMUSCULAR; INTRAVENOUS
Status: DISCONTINUED | OUTPATIENT
Start: 2020-11-12 | End: 2020-11-12 | Stop reason: HOSPADM

## 2020-11-12 RX ORDER — ONDANSETRON 2 MG/ML
4 INJECTION INTRAMUSCULAR; INTRAVENOUS AS NEEDED
Status: DISCONTINUED | OUTPATIENT
Start: 2020-11-12 | End: 2020-11-12 | Stop reason: HOSPADM

## 2020-11-12 RX ORDER — SODIUM CHLORIDE 0.9 % (FLUSH) 0.9 %
5-40 SYRINGE (ML) INJECTION AS NEEDED
Status: DISCONTINUED | OUTPATIENT
Start: 2020-11-12 | End: 2020-11-12 | Stop reason: HOSPADM

## 2020-11-12 RX ORDER — AMOXICILLIN 250 MG
1 CAPSULE ORAL 2 TIMES DAILY
Status: DISCONTINUED | OUTPATIENT
Start: 2020-11-12 | End: 2020-11-13 | Stop reason: HOSPADM

## 2020-11-12 RX ORDER — CEFAZOLIN SODIUM/WATER 2 G/20 ML
2 SYRINGE (ML) INTRAVENOUS EVERY 8 HOURS
Status: COMPLETED | OUTPATIENT
Start: 2020-11-12 | End: 2020-11-13

## 2020-11-12 RX ORDER — CEFAZOLIN SODIUM/WATER 2 G/20 ML
2 SYRINGE (ML) INTRAVENOUS ONCE
Status: DISCONTINUED | OUTPATIENT
Start: 2020-11-12 | End: 2020-11-12 | Stop reason: SDUPTHER

## 2020-11-12 RX ORDER — ASPIRIN 325 MG
325 TABLET, DELAYED RELEASE (ENTERIC COATED) ORAL 2 TIMES DAILY
Status: DISCONTINUED | OUTPATIENT
Start: 2020-11-12 | End: 2020-11-13 | Stop reason: HOSPADM

## 2020-11-12 RX ORDER — ROCURONIUM BROMIDE 10 MG/ML
INJECTION, SOLUTION INTRAVENOUS AS NEEDED
Status: DISCONTINUED | OUTPATIENT
Start: 2020-11-12 | End: 2020-11-12 | Stop reason: HOSPADM

## 2020-11-12 RX ORDER — PHENYLEPHRINE HCL IN 0.9% NACL 0.4MG/10ML
SYRINGE (ML) INTRAVENOUS AS NEEDED
Status: DISCONTINUED | OUTPATIENT
Start: 2020-11-12 | End: 2020-11-12

## 2020-11-12 RX ORDER — MIDAZOLAM HYDROCHLORIDE 1 MG/ML
1 INJECTION, SOLUTION INTRAMUSCULAR; INTRAVENOUS AS NEEDED
Status: DISCONTINUED | OUTPATIENT
Start: 2020-11-12 | End: 2020-11-12 | Stop reason: HOSPADM

## 2020-11-12 RX ORDER — ONDANSETRON 2 MG/ML
4 INJECTION INTRAMUSCULAR; INTRAVENOUS
Status: DISCONTINUED | OUTPATIENT
Start: 2020-11-12 | End: 2020-11-13 | Stop reason: HOSPADM

## 2020-11-12 RX ORDER — FENTANYL CITRATE 50 UG/ML
50 INJECTION, SOLUTION INTRAMUSCULAR; INTRAVENOUS AS NEEDED
Status: COMPLETED | OUTPATIENT
Start: 2020-11-12 | End: 2020-11-12

## 2020-11-12 RX ORDER — SODIUM CHLORIDE 0.9 % (FLUSH) 0.9 %
5-40 SYRINGE (ML) INJECTION EVERY 8 HOURS
Status: DISCONTINUED | OUTPATIENT
Start: 2020-11-12 | End: 2020-11-13 | Stop reason: HOSPADM

## 2020-11-12 RX ORDER — POLYETHYLENE GLYCOL 3350 17 G/17G
17 POWDER, FOR SOLUTION ORAL DAILY
Status: DISCONTINUED | OUTPATIENT
Start: 2020-11-13 | End: 2020-11-13 | Stop reason: HOSPADM

## 2020-11-12 RX ORDER — EPHEDRINE SULFATE/0.9% NACL/PF 50 MG/5 ML
SYRINGE (ML) INTRAVENOUS AS NEEDED
Status: DISCONTINUED | OUTPATIENT
Start: 2020-11-12 | End: 2020-11-12 | Stop reason: HOSPADM

## 2020-11-12 RX ORDER — ROPIVACAINE HYDROCHLORIDE 5 MG/ML
INJECTION, SOLUTION EPIDURAL; INFILTRATION; PERINEURAL
Status: COMPLETED | OUTPATIENT
Start: 2020-11-12 | End: 2020-11-12

## 2020-11-12 RX ORDER — OXYCODONE HYDROCHLORIDE 5 MG/1
5 TABLET ORAL AS NEEDED
Status: DISCONTINUED | OUTPATIENT
Start: 2020-11-12 | End: 2020-11-12 | Stop reason: HOSPADM

## 2020-11-12 RX ORDER — EPINEPHRINE 1 MG/ML
INJECTION, SOLUTION, CONCENTRATE INTRAVENOUS AS NEEDED
Status: DISCONTINUED | OUTPATIENT
Start: 2020-11-12 | End: 2020-11-12 | Stop reason: HOSPADM

## 2020-11-12 RX ORDER — SODIUM CHLORIDE 0.9 % (FLUSH) 0.9 %
5-40 SYRINGE (ML) INJECTION AS NEEDED
Status: DISCONTINUED | OUTPATIENT
Start: 2020-11-12 | End: 2020-11-13 | Stop reason: HOSPADM

## 2020-11-12 RX ORDER — EPHEDRINE SULFATE/0.9% NACL/PF 50 MG/5 ML
SYRINGE (ML) INTRAVENOUS AS NEEDED
Status: DISCONTINUED | OUTPATIENT
Start: 2020-11-12 | End: 2020-11-12

## 2020-11-12 RX ORDER — HYDROMORPHONE HYDROCHLORIDE 1 MG/ML
0.5 INJECTION, SOLUTION INTRAMUSCULAR; INTRAVENOUS; SUBCUTANEOUS
Status: DISCONTINUED | OUTPATIENT
Start: 2020-11-12 | End: 2020-11-13 | Stop reason: HOSPADM

## 2020-11-12 RX ORDER — PROPOFOL 10 MG/ML
INJECTION, EMULSION INTRAVENOUS AS NEEDED
Status: DISCONTINUED | OUTPATIENT
Start: 2020-11-12 | End: 2020-11-12 | Stop reason: HOSPADM

## 2020-11-12 RX ORDER — KETAMINE HYDROCHLORIDE 10 MG/ML
INJECTION, SOLUTION INTRAMUSCULAR; INTRAVENOUS AS NEEDED
Status: DISCONTINUED | OUTPATIENT
Start: 2020-11-12 | End: 2020-11-12 | Stop reason: HOSPADM

## 2020-11-12 RX ORDER — BACITRACIN 500 [USP'U]/G
OINTMENT TOPICAL AS NEEDED
Status: DISCONTINUED | OUTPATIENT
Start: 2020-11-12 | End: 2020-11-12 | Stop reason: HOSPADM

## 2020-11-12 RX ORDER — FENTANYL CITRATE 50 UG/ML
INJECTION, SOLUTION INTRAMUSCULAR; INTRAVENOUS AS NEEDED
Status: DISCONTINUED | OUTPATIENT
Start: 2020-11-12 | End: 2020-11-12 | Stop reason: HOSPADM

## 2020-11-12 RX ORDER — SODIUM CHLORIDE 9 MG/ML
100 INJECTION, SOLUTION INTRAVENOUS CONTINUOUS
Status: DISPENSED | OUTPATIENT
Start: 2020-11-12 | End: 2020-11-13

## 2020-11-12 RX ORDER — FENTANYL CITRATE 50 UG/ML
25 INJECTION, SOLUTION INTRAMUSCULAR; INTRAVENOUS
Status: DISCONTINUED | OUTPATIENT
Start: 2020-11-12 | End: 2020-11-12 | Stop reason: HOSPADM

## 2020-11-12 RX ORDER — ATORVASTATIN CALCIUM 10 MG/1
10 TABLET, FILM COATED ORAL DAILY
Status: DISCONTINUED | OUTPATIENT
Start: 2020-11-13 | End: 2020-11-13 | Stop reason: HOSPADM

## 2020-11-12 RX ORDER — CEFAZOLIN SODIUM 1 G/3ML
INJECTION, POWDER, FOR SOLUTION INTRAMUSCULAR; INTRAVENOUS AS NEEDED
Status: DISCONTINUED | OUTPATIENT
Start: 2020-11-12 | End: 2020-11-12 | Stop reason: HOSPADM

## 2020-11-12 RX ORDER — FACIAL-BODY WIPES
10 EACH TOPICAL DAILY PRN
Status: DISCONTINUED | OUTPATIENT
Start: 2020-11-14 | End: 2020-11-13 | Stop reason: HOSPADM

## 2020-11-12 RX ORDER — DIPHENHYDRAMINE HYDROCHLORIDE 50 MG/ML
12.5 INJECTION, SOLUTION INTRAMUSCULAR; INTRAVENOUS AS NEEDED
Status: DISCONTINUED | OUTPATIENT
Start: 2020-11-12 | End: 2020-11-12 | Stop reason: HOSPADM

## 2020-11-12 RX ORDER — HYDROCHLOROTHIAZIDE 25 MG/1
25 TABLET ORAL DAILY
Status: DISCONTINUED | OUTPATIENT
Start: 2020-11-13 | End: 2020-11-13 | Stop reason: HOSPADM

## 2020-11-12 RX ORDER — GENTAMICIN SULFATE 40 MG/ML
INJECTION, SOLUTION INTRAMUSCULAR; INTRAVENOUS AS NEEDED
Status: DISCONTINUED | OUTPATIENT
Start: 2020-11-12 | End: 2020-11-12 | Stop reason: HOSPADM

## 2020-11-12 RX ORDER — ONDANSETRON 2 MG/ML
INJECTION INTRAMUSCULAR; INTRAVENOUS AS NEEDED
Status: DISCONTINUED | OUTPATIENT
Start: 2020-11-12 | End: 2020-11-12 | Stop reason: HOSPADM

## 2020-11-12 RX ORDER — DEXTROSE, SODIUM CHLORIDE, SODIUM LACTATE, POTASSIUM CHLORIDE, AND CALCIUM CHLORIDE 5; .6; .31; .03; .02 G/100ML; G/100ML; G/100ML; G/100ML; G/100ML
125 INJECTION, SOLUTION INTRAVENOUS CONTINUOUS
Status: DISCONTINUED | OUTPATIENT
Start: 2020-11-12 | End: 2020-11-12 | Stop reason: HOSPADM

## 2020-11-12 RX ORDER — SODIUM CHLORIDE, SODIUM LACTATE, POTASSIUM CHLORIDE, CALCIUM CHLORIDE 600; 310; 30; 20 MG/100ML; MG/100ML; MG/100ML; MG/100ML
125 INJECTION, SOLUTION INTRAVENOUS CONTINUOUS
Status: DISCONTINUED | OUTPATIENT
Start: 2020-11-12 | End: 2020-11-12 | Stop reason: HOSPADM

## 2020-11-12 RX ORDER — SODIUM CHLORIDE 0.9 % (FLUSH) 0.9 %
5-40 SYRINGE (ML) INJECTION EVERY 8 HOURS
Status: DISCONTINUED | OUTPATIENT
Start: 2020-11-12 | End: 2020-11-12 | Stop reason: HOSPADM

## 2020-11-12 RX ORDER — BACITRACIN 500 [USP'U]/G
OINTMENT TOPICAL 3 TIMES DAILY
Status: DISCONTINUED | OUTPATIENT
Start: 2020-11-12 | End: 2020-11-12

## 2020-11-12 RX ORDER — SUCCINYLCHOLINE CHLORIDE 20 MG/ML
INJECTION INTRAMUSCULAR; INTRAVENOUS AS NEEDED
Status: DISCONTINUED | OUTPATIENT
Start: 2020-11-12 | End: 2020-11-12 | Stop reason: HOSPADM

## 2020-11-12 RX ORDER — FAMOTIDINE 20 MG/1
20 TABLET, FILM COATED ORAL 2 TIMES DAILY
Status: DISCONTINUED | OUTPATIENT
Start: 2020-11-12 | End: 2020-11-13 | Stop reason: HOSPADM

## 2020-11-12 RX ORDER — GLYCOPYRROLATE 0.2 MG/ML
INJECTION INTRAMUSCULAR; INTRAVENOUS AS NEEDED
Status: DISCONTINUED | OUTPATIENT
Start: 2020-11-12 | End: 2020-11-12 | Stop reason: HOSPADM

## 2020-11-12 RX ORDER — OXYCODONE HYDROCHLORIDE 5 MG/1
5 TABLET ORAL
Status: DISCONTINUED | OUTPATIENT
Start: 2020-11-12 | End: 2020-11-13 | Stop reason: HOSPADM

## 2020-11-12 RX ORDER — HYDROXYZINE HYDROCHLORIDE 10 MG/1
10 TABLET, FILM COATED ORAL
Status: DISCONTINUED | OUTPATIENT
Start: 2020-11-12 | End: 2020-11-13 | Stop reason: HOSPADM

## 2020-11-12 RX ORDER — NEOSTIGMINE METHYLSULFATE 1 MG/ML
INJECTION INTRAVENOUS AS NEEDED
Status: DISCONTINUED | OUTPATIENT
Start: 2020-11-12 | End: 2020-11-12 | Stop reason: HOSPADM

## 2020-11-12 RX ORDER — NALOXONE HYDROCHLORIDE 0.4 MG/ML
0.4 INJECTION, SOLUTION INTRAMUSCULAR; INTRAVENOUS; SUBCUTANEOUS AS NEEDED
Status: DISCONTINUED | OUTPATIENT
Start: 2020-11-12 | End: 2020-11-13 | Stop reason: HOSPADM

## 2020-11-12 RX ORDER — LIDOCAINE HYDROCHLORIDE 10 MG/ML
0.5 INJECTION, SOLUTION EPIDURAL; INFILTRATION; INTRACAUDAL; PERINEURAL AS NEEDED
Status: DISCONTINUED | OUTPATIENT
Start: 2020-11-12 | End: 2020-11-12 | Stop reason: HOSPADM

## 2020-11-12 RX ORDER — DEXAMETHASONE SODIUM PHOSPHATE 4 MG/ML
INJECTION, SOLUTION INTRA-ARTICULAR; INTRALESIONAL; INTRAMUSCULAR; INTRAVENOUS; SOFT TISSUE AS NEEDED
Status: DISCONTINUED | OUTPATIENT
Start: 2020-11-12 | End: 2020-11-12 | Stop reason: HOSPADM

## 2020-11-12 RX ORDER — LIDOCAINE HYDROCHLORIDE 20 MG/ML
INJECTION, SOLUTION EPIDURAL; INFILTRATION; INTRACAUDAL; PERINEURAL AS NEEDED
Status: DISCONTINUED | OUTPATIENT
Start: 2020-11-12 | End: 2020-11-12 | Stop reason: HOSPADM

## 2020-11-12 RX ORDER — OXYCODONE HYDROCHLORIDE 5 MG/1
10 TABLET ORAL
Status: DISCONTINUED | OUTPATIENT
Start: 2020-11-12 | End: 2020-11-13 | Stop reason: HOSPADM

## 2020-11-12 RX ORDER — MORPHINE SULFATE 10 MG/ML
2 INJECTION, SOLUTION INTRAMUSCULAR; INTRAVENOUS
Status: DISCONTINUED | OUTPATIENT
Start: 2020-11-12 | End: 2020-11-12 | Stop reason: HOSPADM

## 2020-11-12 RX ADMIN — GLYCOPYRROLATE 0.4 MG: 0.2 INJECTION, SOLUTION INTRAMUSCULAR; INTRAVENOUS at 13:58

## 2020-11-12 RX ADMIN — ONDANSETRON 4 MG: 2 INJECTION INTRAMUSCULAR; INTRAVENOUS at 16:02

## 2020-11-12 RX ADMIN — MIDAZOLAM 0.5 MG: 1 INJECTION INTRAMUSCULAR; INTRAVENOUS at 18:30

## 2020-11-12 RX ADMIN — FAMOTIDINE 20 MG: 20 TABLET ORAL at 20:21

## 2020-11-12 RX ADMIN — FENTANYL CITRATE 50 MCG: 50 INJECTION, SOLUTION INTRAMUSCULAR; INTRAVENOUS at 10:28

## 2020-11-12 RX ADMIN — TRANEXAMIC ACID 1 G: 100 INJECTION, SOLUTION INTRAVENOUS at 12:08

## 2020-11-12 RX ADMIN — MIDAZOLAM HYDROCHLORIDE 3 MG: 1 INJECTION, SOLUTION INTRAMUSCULAR; INTRAVENOUS at 11:42

## 2020-11-12 RX ADMIN — SODIUM CHLORIDE, SODIUM LACTATE, POTASSIUM CHLORIDE, AND CALCIUM CHLORIDE: 600; 310; 30; 20 INJECTION, SOLUTION INTRAVENOUS at 14:13

## 2020-11-12 RX ADMIN — OXYCODONE HYDROCHLORIDE 5 MG: 5 TABLET ORAL at 16:34

## 2020-11-12 RX ADMIN — CEFAZOLIN SODIUM 2 G: 300 INJECTION, POWDER, LYOPHILIZED, FOR SOLUTION INTRAVENOUS at 20:21

## 2020-11-12 RX ADMIN — CEFAZOLIN 2 G: 330 INJECTION, POWDER, FOR SOLUTION INTRAMUSCULAR; INTRAVENOUS at 12:04

## 2020-11-12 RX ADMIN — ASPIRIN 325 MG: 325 TABLET, COATED ORAL at 20:21

## 2020-11-12 RX ADMIN — Medication 10 MG: at 13:48

## 2020-11-12 RX ADMIN — ROCURONIUM BROMIDE 10 MG: 10 SOLUTION INTRAVENOUS at 11:50

## 2020-11-12 RX ADMIN — SODIUM CHLORIDE 100 ML/HR: 900 INJECTION, SOLUTION INTRAVENOUS at 16:04

## 2020-11-12 RX ADMIN — NEOSTIGMINE METHYLSULFATE 2 MG: 1 INJECTION, SOLUTION INTRAVENOUS at 13:58

## 2020-11-12 RX ADMIN — ROCURONIUM BROMIDE 30 MG: 10 SOLUTION INTRAVENOUS at 11:56

## 2020-11-12 RX ADMIN — Medication 10 MG: at 13:12

## 2020-11-12 RX ADMIN — OXYCODONE HYDROCHLORIDE 5 MG: 5 TABLET ORAL at 21:40

## 2020-11-12 RX ADMIN — MIDAZOLAM HYDROCHLORIDE 2 MG: 1 INJECTION, SOLUTION INTRAMUSCULAR; INTRAVENOUS at 11:46

## 2020-11-12 RX ADMIN — FENTANYL CITRATE 50 MCG: 50 INJECTION, SOLUTION INTRAMUSCULAR; INTRAVENOUS at 10:25

## 2020-11-12 RX ADMIN — ROPIVACAINE HYDROCHLORIDE 30 ML: 5 INJECTION, SOLUTION EPIDURAL; INFILTRATION; PERINEURAL at 10:32

## 2020-11-12 RX ADMIN — SODIUM CHLORIDE, SODIUM LACTATE, POTASSIUM CHLORIDE, AND CALCIUM CHLORIDE 125 ML/HR: 600; 310; 30; 20 INJECTION, SOLUTION INTRAVENOUS at 10:11

## 2020-11-12 RX ADMIN — KETAMINE HYDROCHLORIDE 30 MG: 10 INJECTION, SOLUTION INTRAMUSCULAR; INTRAVENOUS at 12:13

## 2020-11-12 RX ADMIN — LIDOCAINE HYDROCHLORIDE 0.5 ML: 10 INJECTION, SOLUTION EPIDURAL; INFILTRATION; INTRACAUDAL; PERINEURAL at 10:11

## 2020-11-12 RX ADMIN — PROPOFOL 200 MG: 10 INJECTION, EMULSION INTRAVENOUS at 11:50

## 2020-11-12 RX ADMIN — FENTANYL CITRATE 100 MCG: 50 INJECTION, SOLUTION INTRAMUSCULAR; INTRAVENOUS at 11:50

## 2020-11-12 RX ADMIN — MIDAZOLAM 2 MG: 1 INJECTION INTRAMUSCULAR; INTRAVENOUS at 10:25

## 2020-11-12 RX ADMIN — LIDOCAINE HYDROCHLORIDE 100 MG: 20 INJECTION, SOLUTION EPIDURAL; INFILTRATION; INTRACAUDAL; PERINEURAL at 11:50

## 2020-11-12 RX ADMIN — Medication 10 MG: at 11:56

## 2020-11-12 RX ADMIN — Medication 10 MG: at 13:25

## 2020-11-12 RX ADMIN — ONDANSETRON HYDROCHLORIDE 4 MG: 2 INJECTION, SOLUTION INTRAMUSCULAR; INTRAVENOUS at 11:54

## 2020-11-12 RX ADMIN — SUCCINYLCHOLINE CHLORIDE 180 MG: 20 INJECTION, SOLUTION INTRAMUSCULAR; INTRAVENOUS at 11:50

## 2020-11-12 RX ADMIN — DEXAMETHASONE SODIUM PHOSPHATE 4 MG: 4 INJECTION, SOLUTION INTRAMUSCULAR; INTRAVENOUS at 11:54

## 2020-11-12 RX ADMIN — PHENYLEPHRINE HYDROCHLORIDE 100 MCG/MIN: 10 INJECTION INTRAVENOUS at 11:54

## 2020-11-12 NOTE — PERIOP NOTES
TRANSFER - OUT REPORT:    Verbal report given to NATA Carvajal(name) on Josafat Santiago  being transferred to room 570(unit) for routine post - op       Report consisted of patients Situation, Background, Assessment and   Recommendations(SBAR). Time Pre op antibiotic given:1204  Anesthesia Stop time: 8113  Morton Present on Transfer to floor:n/a  Order for Morton on Chart:n/a  Discharge Prescriptions with Chart:n/a    Information from the following report(s) SBAR, Kardex, OR Summary, Procedure Summary, Intake/Output, MAR, Recent Results and Cardiac Rhythm SR was reviewed with the receiving nurse. Opportunity for questions and clarification was provided. Is the patient on 02? NO     Is the patient on a monitor? NO    Is the nurse transporting with the patient? NO    Surgical Waiting Area notified of patient's transfer from PACU?  YES      The following personal items collected during your admission accompanied patient upon transfer:   Dental Appliance:    Vision:    Hearing Aid:    Jewelry:    Clothing: Clothing: (clothing bag, glasses, hearing aids(2) to pacu)  Other Valuables:    Valuables sent to safe:

## 2020-11-12 NOTE — ANESTHESIA POSTPROCEDURE EVALUATION
Procedure(s):  RIGHT TOTAL SHOULDER ARTHROPLASTY . general    Anesthesia Post Evaluation        Patient location during evaluation: PACU  Patient participation: complete - patient participated  Level of consciousness: awake and alert  Pain management: adequate  Airway patency: patent  Anesthetic complications: no  Cardiovascular status: acceptable  Respiratory status: acceptable  Hydration status: acceptable  Comments: I have seen and evaluated the patient and is ready for discharge. Tracey Roberson MD    Post anesthesia nausea and vomiting:  none      INITIAL Post-op Vital signs:   Vitals Value Taken Time   /80 11/12/2020  2:50 PM   Temp 36.8 °C (98.2 °F) 11/12/2020  2:34 PM   Pulse 85 11/12/2020  2:51 PM   Resp 18 11/12/2020  2:51 PM   SpO2 94 % 11/12/2020  2:51 PM   Vitals shown include unvalidated device data.

## 2020-11-12 NOTE — BRIEF OP NOTE
Brief Postoperative Note    Patient: Debbie Servin  YOB: 1945  MRN: [id number]    Date of Procedure: 11/12/2020     Pre-Op Diagnosis: OSTEOARTHRITIS RIGHT SHOULDER    Post-Op Diagnosis: Same as preoperative diagnosis. Procedure(s):  RIGHT TOTAL SHOULDER ARTHROPLASTY     Surgeon(s): MD Curtis Mireles MD    Surgical Assistant: Surg Asst-1: Kyle Thayer    Anesthesia: General with interscalene block    Estimated Blood Loss (mL): 162     Complications: None    Specimens: bone discarded    Implants:   Implant Name Type Inv.  Item Serial No.  Lot No. LRB No. Used Action   CEMEX SYSTEM GENTA FAST    NA [employer] [id number] Right 1 Implanted   STEM HUM [id number] SHLDR SHERI PRESSFIT EQUINOXE - [id number]  STEM HUM [id number] SHLDR SHERI PRESSFIT EQUINOXE [id number] [employer]_WD NA Right 1 Implanted   CAGE SAUL L Amina [place] - [id number]  CAGE Kathern Melanie [id number] [employer] INC_WD NA Right 1 Implanted   HEAD HUM [id number] SH SHLDR FOR HEMIARTHROPLASTY EQUINOXE - [id number]  HEAD HUM [id number] SH SHLDR FOR HEMIARTHROPLASTY EQUINOXE [id number] [employer]_WD NA Right 1 Implanted   PLATE BNE 1.5PW AZALIA REPLICATOR O/S EQUINOXE - [id number]  PLATE BNE 3.1EA AZALIA REPLICATOR O/S EQUINOXE [id number] [employer]_WD NA Right 1 Implanted   SCREW BNE AD PED L47MM [id number] CANC SHLDR NONLOCKING - [id number]  SCREW BNE AD PED L47MM [id number] CANC SHLDR NONLOCKING [id number] [employer]_WD NA Right 1 Implanted       Drains: * No LDAs found *    Findings: oa    Electronically Signed by Cee Jones MD on 11/12/2020 at 1:43 PM

## 2020-11-12 NOTE — PERIOP NOTES
1032: RT neck Interscalene nerve block done by Dr Ne Dodson using 30cc of 0.5% ropivicaine with nerve stimulator, with pt monitored, O2 @ 3l/m/nc and IV sedation given. Pt tolerated procedure well. Groggy but talkative. VSS post block: 121/89-62-(10-12), SaO2=98% on 3l/m/nc. See anesthesia note.

## 2020-11-12 NOTE — ANESTHESIA PROCEDURE NOTES
Peripheral Block    Start time: 11/12/2020 10:22 AM  End time: 11/12/2020 10:32 AM  Performed by: Martin Palomares MD  Authorized by: Martin Palomares MD       Pre-procedure: Indications: at surgeon's request and post-op pain management    Preanesthetic Checklist: risks and benefits discussed, site marked and timeout performed    Timeout Time: 10:22          Block Type:   Block Type:   Interscalene  Laterality:  Right  Monitoring:  Standard ASA monitoring, continuous pulse ox, frequent vital sign checks, heart rate, responsive to questions and oxygen  Injection Technique:  Single shot  Procedures: ultrasound guided and nerve stimulator    Patient Position: supine  Prep: betadine and povidone-iodine 7.5% surgical scrub    Location:  Interscalene  Needle Type:  Stimuplex  Needle Gauge:  22 G  Needle Localization:  Nerve stimulator and ultrasound guidance  Motor Response comment:   Motor Response: minimal motor response >0.4 mA   Medication Injected:  Ropivacaine (PF) (NAROPIN)(0.5%) 5 mg/mL injection, 30 mL  Med Admin Time: 11/12/2020 10:32 AM    Assessment:  Number of attempts:  1  Injection Assessment:  Incremental injection every 5 mL, local visualized surrounding nerve on ultrasound, negative aspiration for blood, no paresthesia, negative aspiration for CSF and ultrasound image on chart  Patient tolerance:  Patient tolerated the procedure well with no immediate complications

## 2020-11-12 NOTE — PERIOP NOTES
3/4 oz sterile betadine paint mixed with 500 ml NaCl--used for irrigation after the implants were placed into the patient's shoulder.

## 2020-11-12 NOTE — H&P
[facility] PRE-OP HISTORY AND PHYSICAL    Subjective:     Patient is a 76 y.o. male presented with a history of right shoulder pain. Onset of symptoms was gradual with gradually worsening course since that time. He is being admitted for surgical management of this condition. Patient Active Problem List    Diagnosis Date Noted    Grade II hemorrhoids 03/14/2019    Benign prostatic hyperplasia with nocturia 10/10/2018    RBBB (right bundle branch block) 07/08/2016    Chronic lower back pain 04/17/2015    Pre-diabetes 04/29/2014    Skin cancer 04/25/2014    Hypertension, essential     Hyperlipidemia      Past Medical History:   Diagnosis Date    Arthritis     SHOULDER, R KNEE, R THUMB, LOWER BACK     Basal cell cancer     multiple    Chronic pain      AND L SHOULDER, LOWER BACK    Hyperlipidemia     Hypertension     Melanoma ([facility] [address]     multiple    Squamous cell carcinoma     multiple      Past Surgical History:   Procedure Laterality Date    HX COLONOSCOPY  2006    HX COLONOSCOPY  07/25/2016    diverticulosis; otherwise normal    HX CYST REMOVAL Left 1961    FOOT    HX HERNIA REPAIR  2008    bilateral     HX KNEE REPLACEMENT Left 2010    HX KNEE REPLACEMENT Left 7/28/16    due to failed replacement    HX OTHER SURGICAL  03/06/2019    banding of hemorrhoids    HX TONSILLECTOMY      HX WISDOM TEETH EXTRACTION        Prior to Admission medications    Medication Sig Start Date End Date Taking? Authorizing Provider   hydroCHLOROthiazide (HYDRODIURIL) 25 mg tablet TAKE 1 TABLET BY MOUTH EVERY DAY 10/20/20  Yes Yeimi Velazquez MD   oxyCODONE-acetaminophen (PERCOCET) 5-325 mg per tablet Take  by mouth as needed.  7/31/20  Yes Provider, Historical   celecoxib (CELEBREX) 200 mg capsule TAKE 1 CAPSULE BY MOUTH EVERY DAY 6/15/20  Yes Yeimi Velazquez MD   atorvastatin (LIPITOR) 10 mg tablet TAKE 1 TABLET BY MOUTH EVERY DAY 6/5/20  Yes Yeimi Velazquez MD   multivitamin (ONE A DAY) tablet Take 1 Tab by mouth daily. Yes Provider, Historical   traMADoL (ULTRAM) 50 mg tablet Take  by mouth as needed. 20   Provider, Historical   amoxicillin (AMOXIL) 500 mg capsule TAKE 4 CAPSULES BY MOUTH 1 HOUR PRIOR TO DENTAL APPT 19   Provider, Historical     Allergies   Allergen Reactions    Acetaminophen Other (comments)     UNABLE TO URINATE, CAUSES FLANK PAIN      Social History     Tobacco Use    Smoking status: Former Smoker     Packs/day: 2.00     Years: 3.00     Pack years: 6.00     Types: Cigarettes     Last attempt to quit: 1971     Years since quittin.8    Smokeless tobacco: Never Used   Substance Use Topics    Alcohol use: Yes     Alcohol/week: 5.0 - 7.0 standard drinks     Types: 5 - 7 Cans of beer per week     Frequency: 2-3 times a week     Drinks per session: 1 or 2     Binge frequency: Never      Family History   Problem Relation Age of Onset    Stroke Mother     Cancer Mother         BREAST    Heart Failure Father     No Known Problems Daughter     Anesth Problems Neg Hx       Review of Systems  A comprehensive review of systems was negative except for that written in the HPI. Objective:     Patient Vitals for the past 8 hrs:   BP Temp Pulse Resp SpO2 Height Weight   20 0942 123/81 99.1 °F (37.3 °C) 79 16 95 % 6' 1\" (1.854 m) 75.9 kg (167 lb 5.3 oz)     Visit Vitals  /81 (BP 1 Location: Left arm, BP Patient Position: At rest)   Pulse 79   Temp 99.1 °F (37.3 °C)   Resp 16   Ht 6' 1\" (1.854 m)   Wt 75.9 kg (167 lb 5.3 oz)   SpO2 95%   BMI 22.08 kg/m²     General:  Alert, cooperative, no distress, appears stated age. Head:  Normocephalic, without obvious abnormality, atraumatic. Eyes:  Conjunctivae/corneas clear. PERRL, EOMs intact. Fundi benign   Ears:  Normal TMs and external ear canals both ears. Nose: Nares normal. Septum midline. Mucosa normal. No drainage or sinus tenderness.    Throat: Lips, mucosa, and tongue normal. Teeth and gums normal.   Neck: Supple, symmetrical, trachea midline, no adenopathy, thyroid: no enlargement/tenderness/nodules, no carotid bruit and no JVD. Back:   Symmetric, no curvature. ROM normal. No CVA tenderness. Lungs:   Clear to auscultation bilaterally. Chest wall:  No tenderness or deformity. Heart:  Regular rate and rhythm, S1, S2 normal, no murmur, click, rub or gallop. Abdomen:   Soft, non-tender. Bowel sounds normal. No masses,  No organomegaly. Extremities:  Right shoulder: pain with loss of ROM. NVI   Pulses: 2+ and symmetric all extremities. Skin: Skin color, texture, turgor normal. No rashes or lesions   Lymph nodes: Cervical, supraclavicular, and axillary nodes normal.   Neurologic: CNII-XII intact. Normal strength, sensation and reflexes throughout. Assessment:     Active Problems:    * No active hospital problems. *      Plan:     The various methods of treatment have been discussed with the patient and family. After consideration of risks, benefits and other options for treatment, the patient has consented to surgical intervention. Risks of infection, DVT, PE, MI, CVA and unforeseen events described to the patient. Additionally discussed the possibility of not being able to resolve all preop pain. Patient understands metal and plastic will be implanted in the body and understands the potential for revision surgery. Patient wishes to proceed with elective surgery.

## 2020-11-12 NOTE — ANESTHESIA PREPROCEDURE EVALUATION
Anesthetic History   No history of anesthetic complications            Review of Systems / Medical History  Patient summary reviewed, nursing notes reviewed and pertinent labs reviewed    Pulmonary  Within defined limits                 Neuro/Psych   Within defined limits           Cardiovascular    Hypertension        Dysrhythmias            GI/Hepatic/Renal  Within defined limits              Endo/Other  Within defined limits           Other Findings              Physical Exam    Airway  Mallampati: I  TM Distance: > 6 cm  Neck ROM: normal range of motion   Mouth opening: Normal     Cardiovascular  Regular rate and rhythm,  S1 and S2 normal,  no murmur, click, rub, or gallop             Dental  No notable dental hx       Pulmonary  Breath sounds clear to auscultation               Abdominal  GI exam deferred       Other Findings            Anesthetic Plan    ASA: 2  Anesthesia type: general      Post-op pain plan if not by surgeon: peripheral nerve block single    Induction: Intravenous  Anesthetic plan and risks discussed with: Patient

## 2020-11-13 VITALS
HEIGHT: 73 IN | HEART RATE: 78 BPM | DIASTOLIC BLOOD PRESSURE: 70 MMHG | TEMPERATURE: 99.9 F | RESPIRATION RATE: 17 BRPM | SYSTOLIC BLOOD PRESSURE: 130 MMHG | WEIGHT: 167.33 LBS | OXYGEN SATURATION: 94 % | BODY MASS INDEX: 22.18 KG/M2

## 2020-11-13 PROCEDURE — 97110 THERAPEUTIC EXERCISES: CPT

## 2020-11-13 PROCEDURE — 77030032497 HC WRP SHLDR WO BGS SOLM -B

## 2020-11-13 PROCEDURE — 74011000250 HC RX REV CODE- 250: Performed by: ORTHOPAEDIC SURGERY

## 2020-11-13 PROCEDURE — 51798 US URINE CAPACITY MEASURE: CPT

## 2020-11-13 PROCEDURE — 74011250637 HC RX REV CODE- 250/637: Performed by: ORTHOPAEDIC SURGERY

## 2020-11-13 PROCEDURE — 97165 OT EVAL LOW COMPLEX 30 MIN: CPT

## 2020-11-13 PROCEDURE — 77030005513 HC CATH URETH FOL11 MDII -B

## 2020-11-13 PROCEDURE — 97535 SELF CARE MNGMENT TRAINING: CPT

## 2020-11-13 PROCEDURE — 2709999900 HC NON-CHARGEABLE SUPPLY

## 2020-11-13 PROCEDURE — 74011250637 HC RX REV CODE- 250/637: Performed by: PHYSICIAN ASSISTANT

## 2020-11-13 PROCEDURE — 74011250636 HC RX REV CODE- 250/636: Performed by: ORTHOPAEDIC SURGERY

## 2020-11-13 RX ORDER — ASPIRIN 81 MG/1
325 TABLET ORAL 2 TIMES DAILY WITH MEALS
Qty: 28 TAB | Refills: 0 | Status: SHIPPED | OUTPATIENT
Start: 2020-11-13 | End: 2020-11-27

## 2020-11-13 RX ORDER — AMOXICILLIN 250 MG
1 CAPSULE ORAL 2 TIMES DAILY
Qty: 30 TAB | Refills: 0 | Status: SHIPPED | OUTPATIENT
Start: 2020-11-13 | End: 2020-12-14

## 2020-11-13 RX ORDER — TAMSULOSIN HYDROCHLORIDE 0.4 MG/1
0.4 CAPSULE ORAL DAILY
Qty: 15 CAP | Refills: 0 | Status: SHIPPED | OUTPATIENT
Start: 2020-11-14 | End: 2020-12-14

## 2020-11-13 RX ORDER — TAMSULOSIN HYDROCHLORIDE 0.4 MG/1
0.4 CAPSULE ORAL DAILY
Status: DISCONTINUED | OUTPATIENT
Start: 2020-11-13 | End: 2020-11-13 | Stop reason: HOSPADM

## 2020-11-13 RX ORDER — OXYCODONE HYDROCHLORIDE 10 MG/1
10 TABLET ORAL
Qty: 50 TAB | Refills: 0 | Status: SHIPPED | OUTPATIENT
Start: 2020-11-13 | End: 2020-11-26

## 2020-11-13 RX ADMIN — OXYCODONE HYDROCHLORIDE 5 MG: 5 TABLET ORAL at 06:35

## 2020-11-13 RX ADMIN — OXYCODONE HYDROCHLORIDE 5 MG: 5 TABLET ORAL at 09:37

## 2020-11-13 RX ADMIN — TAMSULOSIN HYDROCHLORIDE 0.4 MG: 0.4 CAPSULE ORAL at 09:35

## 2020-11-13 RX ADMIN — MULTIPLE VITAMINS W/ MINERALS TAB 1 TABLET: TAB at 09:36

## 2020-11-13 RX ADMIN — OXYCODONE HYDROCHLORIDE 10 MG: 5 TABLET ORAL at 15:32

## 2020-11-13 RX ADMIN — FAMOTIDINE 20 MG: 20 TABLET ORAL at 09:36

## 2020-11-13 RX ADMIN — ASPIRIN 325 MG: 325 TABLET, COATED ORAL at 09:36

## 2020-11-13 RX ADMIN — CEFAZOLIN SODIUM 2 G: 300 INJECTION, POWDER, LYOPHILIZED, FOR SOLUTION INTRAVENOUS at 03:35

## 2020-11-13 RX ADMIN — OXYCODONE HYDROCHLORIDE 10 MG: 5 TABLET ORAL at 12:32

## 2020-11-13 RX ADMIN — ATORVASTATIN CALCIUM 10 MG: 10 TABLET, FILM COATED ORAL at 09:36

## 2020-11-13 RX ADMIN — OXYCODONE HYDROCHLORIDE 5 MG: 5 TABLET ORAL at 03:17

## 2020-11-13 NOTE — PROGRESS NOTES
OCCUPATIONAL THERAPY EVALUATION/DISCHARGE  Patient: Alexis Mcmillan (73 y.o. male)  Date: 11/13/2020  Primary Diagnosis: OA (osteoarthritis) of shoulder [M19.019]  Procedure(s) (LRB):  RIGHT TOTAL SHOULDER ARTHROPLASTY  (Right) 1 Day Post-Op   Precautions:   Fall(sling)No active shoulder flexion, Elbow wrist and hand AROM, no ER greater than neutral    ASSESSMENT  Based on the objective data described below, the patient presents with decreased independence with self care following admission for R TSR. Pt does have significant pain in his right shoulder with all tasks but with deep breathing and relaxation, he does have some improvement with pain overall. Pt was able to progress OOB with stable vitals signs. He completed light upper body bathing activities and dressing tasks. Wife present and assisted with tasks. Pt was able to progress from chair to bathroom to attempt to void bladder. Noted small amount of red tinged urine (about 10mL of urine). Wife engaged in training and asking appropriate questions. He did well but was limited by pain in regards to completing pendulum exercises. Provided demonstration for pendulum exercises for home but unable to complete exercises due to increased level of pain in his right shoulder. Provided instruction for review for home. Wife and patent asking questions and reporting no further concerns for discharge. Pt is cleared from therapy for discharge. Current Level of Function (ADLs/self-care): mod A overall    Functional Outcome Measure: The patient scored 90 on the Barthel Index outcome measure which is indicative of 10% impairment with ADL. Other factors to consider for discharge: pain, difficulty voiding bladder, low BP     PLAN :  Recommend with staff: Steven Frame in chair TID, up Ad natividad with wife when fluids disconnected.      Recommendation for discharge: (in order for the patient to meet his/her long term goals)  Outpatient occupational therapy follow up recommended for R TSR    This discharge recommendation:  Has been made in collaboration with the attending provider and/or case management    IF patient discharges home will need the following DME: none       SUBJECTIVE:   Patient stated I am having trouble peeing.     OBJECTIVE DATA SUMMARY:   HISTORY:   Past Medical History:   Diagnosis Date    Arthritis     SHOULDER, R KNEE, R THUMB, LOWER BACK     Basal cell cancer     multiple    Chronic pain      AND L SHOULDER, LOWER BACK    Hyperlipidemia     Hypertension     Melanoma ([facility] [address]     multiple    Squamous cell carcinoma     multiple     Past Surgical History:   Procedure Laterality Date    HX COLONOSCOPY  2006    HX COLONOSCOPY  07/25/2016    diverticulosis; otherwise normal    HX CYST REMOVAL Left 1961    FOOT    HX HERNIA REPAIR  2008    bilateral     HX KNEE REPLACEMENT Left 2010    HX KNEE REPLACEMENT Left 7/28/16    due to failed replacement    HX OTHER SURGICAL  03/06/2019    banding of hemorrhoids    HX TONSILLECTOMY      HX WISDOM TEETH EXTRACTION         Prior Level of Function/Environment/Context: pt is independent at home. He completes all ADL and IADL activities unassisted.    Expanded or extensive additional review of patient history:   Home Situation  Home Environment: Private residence  # Steps to Enter: 4  One/Two Story Residence: One story  Living Alone: No  Support Systems: Spouse/Significant Other/Partner  Patient Expects to be Discharged to[de-identified] Private residence  Current DME Used/Available at Home: None  Tub or Shower Type: Shower    Hand dominance: Right    EXAMINATION OF PERFORMANCE DEFICITS:  Cognitive/Behavioral Status:  Neurologic State: Alert  Orientation Level: Oriented X4  Cognition: Appropriate for age attention/concentration  Perception: Appears intact  Perseveration: No perseveration noted  Safety/Judgement: Good awareness of safety precautions    Skin: see nursing notes    Edema: none noted    Hearing: Auditory  Auditory Impairment: Hard of hearing, bilateral  Hearing Aids/Status: With patient    Vision/Perceptual:                           Acuity: Within Defined Limits         Range of Motion:    AROM: Within functional limits(RUE impaired due to R TSR)  PROM: Within functional limits(RUE impaired due to R TSR)                      Strength:    Strength: Within functional limits(RUE impaired due to R TSR)                Coordination:  Coordination: Within functional limits(RUE impaired due to R TSR)  Fine Motor Skills-Upper: Right Intact; Left Intact    Gross Motor Skills-Upper: Right Impaired;Left Intact    Tone & Sensation:    Tone: Normal  Sensation: Intact                      Balance:  Sitting: Intact  Standing: Intact    Functional Mobility and Transfers for ADLs:  Bed Mobility:  Supine to Sit: Independent  Sit to Supine: (remained OOB in chair)    Transfers:  Sit to Stand: Supervision  Stand to Sit: Supervision  Bed to Chair: Supervision  Bathroom Mobility: Supervision/set up  Toilet Transfer : Supervision    ADL Assessment:  Feeding: Supervision    Oral Facial Hygiene/Grooming: Supervision    Bathing: Moderate assistance    Upper Body Dressing: Moderate assistance    Lower Body Dressing: Moderate assistance    Toileting: Supervision                ADL Intervention and task modifications:     Dressing Training:  Pt received education and training for darrion dressing techniques following shoulder replacement surgery. Pt provided education for donning button down shirts as well and pull over cotton shirts. Button down shirts: educated to don surgical arm first and then to adjust over his back and don strong arm. Pt educated that he can use both hands to button shirts. T-shirt: educated pt to don over surgical arm first, then adjust over head, and then don strong arm in shirt. Educated patient that a size larger does make this a little easier for home.   Overall complete upper body dressing with mod A. Also received education and training for how to properly don abduction wedge sling. Adjust sling for proper comfort and educated pt how to adjust strapping for home. Pendulum exercises demonstrated for patient in standing with left using the counter for support. Exercises including gentle shoulder flexion, abduction/adduction, clockwise circles, and counter-clockwise circles. Pt instructed and demonstrated neck stretching exercises. Pt completed elbow flexion, supination/prongation, wrist flexion/extension, and active hand flexion/extension. Pt tolerated exercises well and iced following intervention. Provided pt with written instructions for reference for home. Recommend pt to complete 10 reps x 3 sets daily. Cognitive Retraining  Safety/Judgement: Good awareness of safety precautions    Functional Measure:  Barthel Index:    Bathin  Bladder: 10(straight cathed numerous times overnight)  Bowels: 10  Groomin  Dressin  Feeding: 10  Mobility: 15  Stairs: 10  Toilet Use: 10  Transfer (Bed to Chair and Back): 15  Total: 90/100        The Barthel ADL Index: Guidelines  1. The index should be used as a record of what a patient does, not as a record of what a patient could do. 2. The main aim is to establish degree of independence from any help, physical or verbal, however minor and for whatever reason. 3. The need for supervision renders the patient not independent. 4. A patient's performance should be established using the best available evidence. Asking the patient, friends/relatives and nurses are the usual sources, but direct observation and common sense are also important. However direct testing is not needed. 5. Usually the patient's performance over the preceding 24-48 hours is important, but occasionally longer periods will be relevant. 6. Middle categories imply that the patient supplies over 50 per cent of the effort.   7. Use of aids to be independent is allowed. Raul Doll., Barthel DAnayeliWAnayeli ([id number]). Functional evaluation: the Barthel Index. [address] (14)2. RUSTAM Purcell Violetta Madeira.Shannon Mena, [address] (). Measuring the change indisability after inpatient rehabilitation; comparison of the responsiveness of the Barthel Index and Functional [place] Measure. Journal of Neurology, Neurosurgery, and Psychiatry, 66(4), 948-968. GABBY Cardoso, GLORIA Justice, & Shelton Fothergill, M.A. (2004.) Assessment of post-stroke quality of life in cost-effectiveness studies: The usefulness of the Barthel Index and the EuroQoL-5D. Quality of Life Research, 15, 751-41       Occupational Therapy Evaluation Charge Determination   History Examination Decision-Making   LOW Complexity : Brief history review  LOW Complexity : 1-3 performance deficits relating to physical, cognitive , or psychosocial skils that result in activity limitations and / or participation restrictions  LOW Complexity : No comorbidities that affect functional and no verbal or physical assistance needed to complete eval tasks       Based on the above components, the patient evaluation is determined to be of the following complexity level: LOW   Pain Ratin/10 pain in right shoulder; provided with ice    Activity Tolerance:   Good  Vitals:    20 0933 20 1010 20 1020   BP: 103/64 105/66 137/77   BP 1 Location:  Left arm Left arm   BP Patient Position:  Supine Sitting   Pulse: 67 70 77   Resp:      Temp:      SpO2:      Weight:      Height:          After treatment patient left in no apparent distress:    Sitting in chair    COMMUNICATION/EDUCATION:   The patients plan of care was discussed with: Physical therapist and Registered nurse.      Thank you for this referral.  Suha Macias OT  Time Calculation: 75 mins

## 2020-11-13 NOTE — PROGRESS NOTES
Bedside shift change report given to Kassy Colón RN (oncoming nurse) by Brenda Cannon (offgoing nurse). Report included the following information SBAR, Kardex, Procedure Summary, Intake/Output, MAR and Recent Results.

## 2020-11-13 NOTE — PROGRESS NOTES
POD 1 Day Post-Op    Procedure:  Procedure(s):  RIGHT TOTAL SHOULDER ARTHROPLASTY     Subjective:     Pain is reasonably well controlled this morning. Block wore off overnight. Tolerating PO well. Required straight cath x2 since surgery. Denies RUE N/T/W.     Objective:     Blood pressure 104/61, pulse 60, temperature 98.7 °F (37.1 °C), resp. rate 16, height 6' 1\" (1.854 m), weight 75.9 kg (167 lb 5.3 oz), SpO2 94 %. Temp (24hrs), Av.1 °F (36.7 °C), Min:97.5 °F (36.4 °C), Max:99.1 °F (37.3 °C)      Physical Exam:  Examination of the right shoulder reveals that the dressing is clean, dry and intact. Sensation is intact to light touch in Ax/M/U/R distributions. AIN/PIN/U motor intact. 2+ radial pulse with brisk capillary refill in the fingers.      Labs:   Lab Results   Component Value Date/Time    HGB 14.3 2020 03:46 PM         Assessment:     Active Problems:    OA (osteoarthritis) of shoulder (2020)        Plan/Recommendations/Medical Decision Making:     Continue physical therapy  Pain control  Sling  Ice  Straight cath prn for now, may need lamb if retention does not improve  Begin discharge planning, likely home this afternoon if passes PT and retention improves

## 2020-11-13 NOTE — OP NOTES
[address]  OPERATIVE REPORT    Name:  Anayeli Gleason  MR#:  [id number]  :  1945  ACCOUNT #:  [de-identified]  DATE OF SERVICE:  2020      PREOPERATIVE DIAGNOSIS:  End-stage osteoarthritis of the right shoulder. POSTOPERATIVE DIAGNOSIS:  End-stage osteoarthritis of the right shoulder. PROCEDURE PERFORMED:  Right total shoulder arthroplasty. SURGEON:  Shiv Heller MD    ASSISTANT:  Dr. Alvarado [place]:  General with interscalene block. COMPLICATIONS:  Zero. SPECIMENS REMOVED:  Bone, discarded. IMPLANTS:  Exactech Equinoxe shoulder Press-Fit stem size 13 with a 4.5 replicator plate and a 53 standard head. The glenoid was a caged peg glenoid size large beta curvature. ESTIMATED BLOOD LOSS:  Approximately 200 mL. IV FLUIDS:  Crystalloids were given. PREOPERATIVE MEDICINE:  Ancef. HISTORY:  The patient is a healthy 45-year-old gentleman who is still very active. He enjoys playing tennis. He has had a decline in his ability to perform daily activities not to mention recreational activities. He had end-stage osteoarthritis finding on the x-ray. We discussed further nonoperative treatment of which he had had for many years and he was growing frustrated with this. Therefore, we had a conversation about total shoulder replacement. I thought he was a very ample patient for this type of surgery. He is very healthy, very active, and had end-stage osteoarthritis. I talked to him about the surgery, the technique, the metal and plastic use. I explained to the patient, there is no guarantee that all of his range of motion will return nor is there any guarantee that all of his strength will return. I did tell him quite predictably the pain reduction from total shoulder is more than successful. I talked to him about the extensive recovery that would follow.   We discussed the risks of the surgery such as but not limited to postoperative pain, swelling, black and blue, numbness and tingling, DVT, PE, MI, CVA and other unseen events could occur in a perioperative fashion. The patient understood and wished to proceed, signed the consent, and was taken to surgery. PROCEDURE:  The patient was taken back to surgery, placed spine on the operating table, administered general anesthetic with endotracheal intubation. He received an interscalene block by the anesthesiologist.  The patient was placed in a beach chair position with all bony prominences protected. The right upper extremity was sterilely prepped and draped x2. The patient received 2 g of Ancef. A time-out was performed and all parties agreed that the right shoulder was the correct shoulder. Following the sterile prep and drape and time-out, an anterior incision was made with a 10-blade. The knife and the handle were passed off the table. Blunt dissection was taken down to the cephalic vein. The cephalic vein was retracted laterally with the deltoid as the deltopectoral interval was exploited. Deep retractors were deployed and the subdeltoid region was released. No evidence of rotator cuff tendon tears were identified. A portion of the pec was taken down for exposure purposes. The short head of the biceps and the clavipectoral fascia were identified. The clavipectoral fascia was opened in line with the short head of the biceps. Deep retractors were placed to the conjoint tendon. Circumflex vasculature was identified and tied off with 2-0 Vicryl. The rotator cuff interval was opened and 2 stay stitches of Ethibond were placed in the musculotendinous junction of the subscapularis tendon. An arthrotomy was made in standard fashion. Inferior dissection was performed along the inferior capsule. The capsule was released. The shoulder was dislocated into the wound. A neck shaft angle guide was used to guide the cut and a saw blade was used to create the osteotomy.   The bone was removed to the back table. Incidentally, there was no evidence of rotator cuff pathology in this patient. The IM canal was entered with a reamer and dilated up to size 13, we broached to 13 and placed a Press-Fit size 13 stem in place. A manhole cover was placed over the trunnion of the stem. The shoulder was posteriorly dislocated. The outrigger for the navigation system was deployed in the coracoid process. The subscap was released from its anterior capsule and rotator cuff interval structures. An anterior neck glenoid retractor was deployed and a posterior glenoid retractor was deployed. A further portion of the anterior inferior ligament and labrum was removed under direct vision. We then mapped all our salient bony point. We chose a standard caged peg glenoid large from our preoperative CT scan. The center was identified using our navigation system and we opened this with our drill. We then reamed maintaining neutral reaming. We reamed all the way up for a large, and a peripheral peg drill guide was then inserted and the peripheral pegs were drilled. We then used our depth gauge and trialed and all was appropriate for size large. Antibiotic-impregnated cement was mixed in the back table. 80 mg of gentamicin was injected in the inferior capsule. The wound was copiously irrigated and the bone sockets were dried. The cement was then impacted in the peripheral pegs. Bone graft was placed in the center peg. The implant was placed with excellent compression. All cement was removed. The wound was then copiously irrigated after curing of the cement. We then brought the shoulder back up into the wound. We placed loosely a 4.5 replicator plate and used the size 50 and the 53. The 50 was just simply too small. The 53 with some minor adjustments, fit this patient better. We did have to rongeur a small area of the inferior aspect of the head. We then trialed with a standard and then a large.   I felt the standard was the appropriate tension. We unfortunately did not have a good torque screw and we removed this torque screw during the first one and reapplied a whole new replicator plate system and this worked perfectly fine. We then trialed once again. This was the appropriate size and location. We then placed  our final 53 into place. There was no instability. The shoulder was reduced. The wound was copiously irrigated. All parties changed out their outer gloves. Betadine and 500 mL of fluid was rinsed to the joint. The subscap was repaired upon itself using #2 FiberWire. The pec was also repaired using #2 FiberWire. A #1 Vicryl was placed in the deltopectoral interval.  2-0 Vicryl was placed in the subcutaneous tissue and a running 4-0 Monocryl was placed in the epidermis. Steri-Strips, Adaptic, and bacitracin ointment were applied over the incision. Sterile dry dressing was placed over the shoulder. The patient was placed in immobilizer, awakened from general anesthetic in stable condition and transferred to the recovery room.         Balbir Gage MD      [id number]  D:  11/12/2020 13:54  T:  11/13/2020 0:30  JOB #:  [id number]

## 2020-11-13 NOTE — PROGRESS NOTES
Bedside and Verbal shift change report given to Isrrael Parnell (oncoming nurse) by Lata Lynch (offgoing nurse). Report included the following information SBAR, Kardex and OR Summary.

## 2020-11-13 NOTE — PROGRESS NOTES
TRANSFER - IN REPORT:    Verbal report received from [facility] AT [place] (name) on Basilio Santos  being received from PACU (unit) for routine post - op      Report consisted of patients Situation, Background, Assessment and   Recommendations(SBAR). Information from the following report(s) SBAR, Kardex and OR Summary was reviewed with the receiving nurse. Opportunity for questions and clarification was provided. Assessment completed upon patients arrival to unit and care assumed. Primary Nurse Adrianne Miranda and William Pressley RN performed a dual skin assessment on this patient. Impairment noted- see wound doc flow sheet. Dionte score is 19. Redness of patient's left gluteal fold.

## 2020-11-13 NOTE — PROGRESS NOTES
Occupational Therapy Note:     Pt's wife at RN station seeking assistance to help patient ot the bathroom. Pt completed toileting with supervision for mobility and min A for clothing mangaement. Pt returned to chair following. Urinal left for nursing to measure and assess.         Connie Yin, OT  Time Calculation: 10 mins

## 2020-11-13 NOTE — PROGRESS NOTES
Transition of Care Plan   RUR- Low   4%   DISPOSITION: TBD/ likely Outpatient Rehab   Transport: family            Reason for Admission:   Shoulder surgery                   RUR Score:    4%                 Plan for utilizing home health:      Likely will need Outpatient HH, wife in agreement    PCP: First and Last name:  Willy Lopez MD   [facility] of [facility]   Are you a current patient: Yes/No:  yes   Approximate date of last visit: October 2020   Can you participate in a virtual visit with your PCP:  [facility]                    Current Advanced Directive/Advance Care Plan:  Full code, has ACP on file                         Transition of Care Plan: This CM met with patient and explained CM role in support and transition of care needs. Patient is independent prior to admission for surgery. First IM was completed. Per wife, patient will be follow up with Outpaient rehab    Patient has Medicare and CSS99na and indicated no concerns with paying for discharge medications. CM to follow as needed. Care Management Interventions  PCP Verified by CM:  Yes  Palliative Care Criteria Met (RRAT>21 & CHF Dx)?: No  Mode of Transport at Discharge: (car)  Transition of Care Consult (CM Consult): (TBD/ CM will need to follow)  Current Support Network: Lives with Spouse, Own Home  Confirm Follow Up Transport: Family  Discharge Location  Discharge Placement: (TBD/ CM will need to follow)  Mireille Song  1:20 PM

## 2020-11-13 NOTE — DISCHARGE INSTRUCTIONS
Postoperative Instructions    Medications/Diet    1. Eat only light, non-greasy foods today  2. Take pain medicine with food  3. While taking pain medicines, you may not operate a vehicle, heavy machinery, or appliances  4. While taking pain medicines, you may not drink alcoholic beverages  5. While taking pain medicines, you may not make critical decisions or sign legal papers  6. If you have any reactions to your medicines, stop taking them and call my office immediately  7. If you are not allergic, take one 325mg aspirin twice a day to help prevent blood clots  8. Please keep in mind that constipation is a very common side effect of taking narcotic pain medication. We recommend that patients take precautions to prevent constipation:   Drink plenty of water (6-8 glasses of 8 oz. a day)   Avoid alcohol, caffeine, and dairy products   Eat plenty of fiber (fruits, vegetables and whole grains)   Take an over the counter stool softener (colace or dulcolax)          Activity/Exercise    1. You may move the arm as directed by physical therapist  2. You may take arm out of sling and move elbow as necessary. Must wear sling while out of the house and while sleeping  3. You may change dressing daily. If no drainage, you may leave dressing off.  4. You may shower on post operative day #7  5. Please keep ice applied to the shoulder for the first 72 hours or as long as pain or swelling persist. Do not apply ice directly to skin, or allow water to leak on your dressing    Emergency/Follow-Up    1. Please notify my office at [phone] if you develop any fever (101° or above), unexpected warmth, redness or swelling in your shoulder  2. Please call if your fingers/toes become cold, purple, numb, or there is excessive bleeding  3. Please call the office within 24 hours at [phone] ([phone]) to schedule a follow up appointment next week  4.  Please call the office before 3pm on Friday if you do not have enough pain medicine for the weekend.  Narcotic pain medication cannot be called into your pharmacy and the prescription must be picked up at our office

## 2020-11-14 NOTE — PROGRESS NOTES
DEMETRIUS Ortega informed that patient has not been able to void since his second straight cath. PA said to place lamb if next bladder scan >400 and discharge with lamb and urology follow-up.

## 2020-11-14 NOTE — PROGRESS NOTES
Patient declined watching joint replacement video before discharge and insistent on leaving. I have reviewed discharge instructions with the patient and spouse. The patient and spouse verbalized understanding.

## 2020-11-16 ENCOUNTER — PATIENT OUTREACH (OUTPATIENT)
Dept: CASE MANAGEMENT | Age: 75
End: 2020-11-16

## 2020-11-16 NOTE — PROGRESS NOTES
Patient was admitted to [facility] on  and discharged on  for Right Shoulder Arthroplasty. Outreach made within 2 business days of discharge: Yes    Top Discharge Challenges to be reviewed by the provider   Additional needs identified to be addressed with provider yes    Abnormal labs:  Potassium  3.3  Low    3.5 - 5.1  mmol/L  Final      Patient having urinary retention post operatively. Patient discharge home with lamb catheter. Has OP follow up with Danna ROMO on 2020 for catheter removal.    Patient has follow up with Dr Marce Robles surgeon on 2020    Patient will have OP PT to be arranged by ortho. Discussed COVID-19 related testing which was available at this time. Test results were negative. Patient informed of results, if available? yes   Method of communication with provider : chart routing       Advance Care Planning:   Does patient have an Advance Directive:  yes; reviewed and current     Inpatient Readmission Risk score: 2  Was this a readmission? no     Patients top risk factors for readmission: urinary retention if unresolved  Interventions to address risk factors: Ensure urology follow up made    Care Transition Nurse (CTN) contacted the family by telephone to perform post hospital discharge assessment. Verified name and  with family as identifiers. Provided introduction to self, and explanation of the CTN role. CTN reviewed discharge instructions, medical action plan and red flags with family who verbalized understanding. Family given an opportunity to ask questions and does not have any further questions or concerns at this time. The family agrees to contact the PCP office for questions related to their healthcare. Medication reconciliation was performed with family, who verbalizes understanding of administration of home medications. Advised obtaining a 90-day supply of all daily and as-needed medications.    Referral to Pharm D needed: no Home Health/Outpatient orders at discharge: Patient to have OP PT arranged after ortho follow up    Covid Risk Education    Patient has following risk factors of: age. Education provided regarding infection prevention, and signs and symptoms of COVID-19 and when to seek medical attention with family who verbalized understanding. Discussed exposure protocols and quarantine From CDC: Are you at higher risk for severe illness?  and given an opportunity for questions and concerns. The family agrees to contact the COVID-19 hotline [phone] or PCP office for questions related to COVID-19. For more information on steps you can take to protect yourself, see CDC's How to Protect Yourself     Patient/family/caregiver given information for [employer] Loop and agrees to enroll no  Patient's preferred e-mail: declines  Patient's preferred phone number: declines    Discussed follow-up appointments. If no appointment was previously scheduled, appointment scheduling offered: yes  Sesar2 Mariano Cheung follow up appointment(s): No future appointments. Non-[facility] follow up appointment(s): Dr Samina Valdes 11/19/2020, Bharat Jung PA on 11/17/2020  Plan for follow-up call in 10-14 days based on severity of symptoms and risk factors. CTN provided contact information for future needs. Goals Addressed                 This Visit's Progress     Prevent complications post hospitalization. 11/16/20   CTN spoke to wife to review discharge instructions/red flags to prevent readmission    Appts:    PCP Dr Mickey Dasilva states she will make follow up appt today as she needs to reschedule a dermatology appt as well. She did not want the appts to conflict. Dr Samina Valdes, ortho--follow up appt already scheduled for 11/19/2020 per wife. She states they will give them information regarding the OP rehab at that time.     [facility]--urology, patient has appt to follow up to have lamb removed tomorrow 11/17/2020     Wife reports patient doing well post operatively. She states patient is performing exercises as shown, denies fever or uncontrolled pain at time of call.  Wife reports catheter draining without complications, urine clear. She has no questions at this time regarding catheter care.  Wife reports they picked up all discharge medications and understand directions for use.  Wife has no questions at this time regarding discharge post op instructions, she states that it was well explained prior to surgery and they have no questions /concerns at this time. CTN will follow up with patient in  10-14 days for updates. Instructed wife to call CTN if she has any concerns that come up. Wife verbalizes understanding. ---sil

## 2020-11-19 NOTE — DISCHARGE SUMMARY
Discharge Summary     Patient ID:  Jeffry Barrios  [id number]  76 y.o.  1945    Admit Date: 11/12/2020    Discharge Date:     Admission Diagnoses: OA (osteoarthritis) of shoulder [M19.019]    Surgeon: Max Waldrop MD    Last Procedure: Procedure(s):  RIGHT TOTAL SHOULDER ARTHROPLASTY       Hospital Course: Normal hospital course for this procedure. Significant Diagnostic Studies: none    Discharge Diagnosis: Active Problems:    OA (osteoarthritis) of shoulder (11/12/2020)         Condition on Discharge: good    Disposition:Home    Followup Care:  Discharge Condition: good  See surgical instructions  Regular Diet  Keep wound clean and dry    Follow-up Information     Follow up With Specialties Details Why Contact Info    Roxanne Weinberg MD Internal Medicine   [address] [facility]  [phone]            MS med list:   Discharge Medication List as of 11/13/2020  3:46 PM      START taking these medications    Details   oxyCODONE IR (ROXICODONE) 10 mg tab immediate release tablet Take 1 Tab by mouth every six (6) hours as needed for Pain for up to 13 days. Max Daily Amount: 40 mg., Normal, Disp-50 Tab,R-0      aspirin delayed-release 81 mg tablet Take 4 Tabs by mouth two (2) times daily (with meals) for 14 days. , Normal, Disp-28 Tab,R-0      senna-docusate (PERICOLACE) 8.6-50 mg per tablet Take 1 Tab by mouth two (2) times a day. Indications: constipation, Normal, Disp-30 Tab,R-0      tamsulosin (FLOMAX) 0.4 mg capsule Take 1 Cap by mouth daily.  Indications: enlarged prostate with urination problem, Normal, Disp-15 Cap,R-0         CONTINUE these medications which have NOT CHANGED    Details   hydroCHLOROthiazide (HYDRODIURIL) 25 mg tablet TAKE 1 TABLET BY MOUTH EVERY DAY, Normal, Disp-90 Tab,R-1      celecoxib (CELEBREX) 200 mg capsule TAKE 1 CAPSULE BY MOUTH EVERY DAY, Normal, Disp-90 Cap,R-1      atorvastatin (LIPITOR) 10 mg tablet TAKE 1 TABLET BY MOUTH EVERY DAY, Normal, Disp-90 Tab,R-1      multivitamin (ONE A DAY) tablet Take 1 Tab by mouth daily. , Historical Med      amoxicillin (AMOXIL) 500 mg capsule TAKE 4 CAPSULES BY MOUTH 1 HOUR PRIOR TO DENTAL APPT, Historical Med, R-3         STOP taking these medications       oxyCODONE-acetaminophen (PERCOCET) 5-325 mg per tablet Comments:   Reason for Stopping:         traMADoL (ULTRAM) 50 mg tablet Comments:   Reason for Stopping:                  Home Going Instructions:   Patient to follow up in one - two weeks. Notify my office if develop fever, chills, redness, unbearble pain or temp.>102. Patient to follow discharge instructions. Patient to call [phone] to set up follow up appointment.         Signed:  Kaleigh Lezama MD  11/19/2020  7:30 AM

## 2020-12-09 ENCOUNTER — PATIENT OUTREACH (OUTPATIENT)
Dept: CASE MANAGEMENT | Age: 75
End: 2020-12-09

## 2020-12-14 ENCOUNTER — OFFICE VISIT (OUTPATIENT)
Dept: INTERNAL MEDICINE CLINIC | Age: 75
End: 2020-12-14
Payer: MEDICARE

## 2020-12-14 VITALS
RESPIRATION RATE: 16 BRPM | OXYGEN SATURATION: 95 % | TEMPERATURE: 98 F | HEIGHT: 73 IN | BODY MASS INDEX: 22.13 KG/M2 | SYSTOLIC BLOOD PRESSURE: 110 MMHG | WEIGHT: 167 LBS | DIASTOLIC BLOOD PRESSURE: 72 MMHG | HEART RATE: 81 BPM

## 2020-12-14 DIAGNOSIS — R35.1 BENIGN PROSTATIC HYPERPLASIA WITH NOCTURIA: Primary | ICD-10-CM

## 2020-12-14 DIAGNOSIS — N40.1 BENIGN PROSTATIC HYPERPLASIA WITH NOCTURIA: Primary | ICD-10-CM

## 2020-12-14 DIAGNOSIS — E87.6 HYPOKALEMIA: ICD-10-CM

## 2020-12-14 DIAGNOSIS — R33.9 URINARY RETENTION: ICD-10-CM

## 2020-12-14 LAB
ANION GAP SERPL CALC-SCNC: 7 MMOL/L (ref 5–15)
BUN SERPL-MCNC: 28 MG/DL (ref 6–20)
BUN/CREAT SERPL: 25 (ref 12–20)
CALCIUM SERPL-MCNC: 9.4 MG/DL (ref 8.5–10.1)
CHLORIDE SERPL-SCNC: 101 MMOL/L (ref 97–108)
CO2 SERPL-SCNC: 32 MMOL/L (ref 21–32)
CREAT SERPL-MCNC: 1.1 MG/DL (ref 0.7–1.3)
GLUCOSE SERPL-MCNC: 95 MG/DL (ref 65–100)
POTASSIUM SERPL-SCNC: 4.1 MMOL/L (ref 3.5–5.1)
SODIUM SERPL-SCNC: 140 MMOL/L (ref 136–145)

## 2020-12-14 PROCEDURE — G8427 DOCREV CUR MEDS BY ELIG CLIN: HCPCS | Performed by: INTERNAL MEDICINE

## 2020-12-14 PROCEDURE — 3017F COLORECTAL CA SCREEN DOC REV: CPT | Performed by: INTERNAL MEDICINE

## 2020-12-14 PROCEDURE — G8752 SYS BP LESS 140: HCPCS | Performed by: INTERNAL MEDICINE

## 2020-12-14 PROCEDURE — 1101F PT FALLS ASSESS-DOCD LE1/YR: CPT | Performed by: INTERNAL MEDICINE

## 2020-12-14 PROCEDURE — G8536 NO DOC ELDER MAL SCRN: HCPCS | Performed by: INTERNAL MEDICINE

## 2020-12-14 PROCEDURE — 99213 OFFICE O/P EST LOW 20 MIN: CPT | Performed by: INTERNAL MEDICINE

## 2020-12-14 PROCEDURE — G8420 CALC BMI NORM PARAMETERS: HCPCS | Performed by: INTERNAL MEDICINE

## 2020-12-14 PROCEDURE — G8432 DEP SCR NOT DOC, RNG: HCPCS | Performed by: INTERNAL MEDICINE

## 2020-12-14 PROCEDURE — G8754 DIAS BP LESS 90: HCPCS | Performed by: INTERNAL MEDICINE

## 2020-12-14 PROCEDURE — G0463 HOSPITAL OUTPT CLINIC VISIT: HCPCS | Performed by: INTERNAL MEDICINE

## 2020-12-14 RX ORDER — CELECOXIB 200 MG/1
CAPSULE ORAL
Qty: 90 CAP | Refills: 1 | Status: SHIPPED | OUTPATIENT
Start: 2020-12-14 | End: 2021-07-06 | Stop reason: SDUPTHER

## 2020-12-14 RX ORDER — ATORVASTATIN CALCIUM 10 MG/1
TABLET, FILM COATED ORAL
Qty: 90 TAB | Refills: 1 | Status: SHIPPED | OUTPATIENT
Start: 2020-12-14 | End: 2021-06-06

## 2020-12-14 NOTE — PROGRESS NOTES
Lilly Good is a 76 y.o. male who was seen in clinic today (12/14/2020) for a Transitional Care Appointment          Assessment & Plan:   Diagnoses and all orders for this visit:    1. Benign prostatic hyperplasia with nocturia- this is a chronic problem, symptoms are: stable and not well controlled, differential dx reviewed with the patient, and favor BPH more then OAB. Reviewed lack of benefit with Flomax and med options for both BPH and OAB. Discussed going back to see urologist.  He will work on decreasing PO intake and then will revisit in 3-4 months. Red flags were reviewed with the patient to RTC or notify me. 2. Urinary retention- resolved, due to anesthesia    3. Hypokalemia- has been low/normal in the past, will repeat today due to HCTZ and verify it has resolved. -     METABOLIC PANEL, BASIC; Future    Other orders- meds refilled  -     atorvastatin (LIPITOR) 10 mg tablet; TAKE 1 TABLET BY MOUTH EVERY DAY  -     celecoxib (CELEBREX) 200 mg capsule; TAKE 1 CAPSULE BY MOUTH EVERY DAY      Follow-up and Dispositions    · Return in about 9 months (around 9/14/2021) for FULL PHYSICAL - 30 minutes. Subjective: Bri Servin was seen today for Hospital Follow Up      Transition Care Management:    Date of admission: 11/12/20  Date of discharge: 11/13/20  Location: [facility]  Diagnosis: arthritis of the shoulder  Nurse Navigator note reviewed: Yes    Patient was contacted by Transitional Care Management services within two days after discharge. Their note was reviewed. .  Medication reconciliation was performed today (12/14/2020). We reviewed the hospital records. He presented for scheduled R shoulder arthroplasty. Complications during admission: per ortho note nothing, per TCM note he was discharged w/ lamb due to urinary retention. Had f/u with [facility] on 11/17. Catheter removed at f/u. He stopped Flomax, did not have any side effects.   He is still getting up several times per night (3-4) and this is the same on or off medication. He noticed more the drinks the better his stream.  He also had a low potassium reading (3.3). Brief Labs:     Lab Results   Component Value Date/Time    Sodium 140 11/05/2020 03:46 PM    Potassium 3.3 11/05/2020 03:46 PM    Creatinine 0.98 11/05/2020 03:46 PM    Cholesterol, total 182 10/26/2020 10:22 AM    HDL Cholesterol 60 10/26/2020 10:22 AM    LDL, calculated 103 08/28/2019 09:13 AM    LDL Chol Calc (NIH) 98 10/26/2020 10:22 AM    Triglyceride 137 10/26/2020 10:22 AM    Hemoglobin A1c 5.9 10/26/2020 10:22 AM    TSH 0.828 09/24/2019 03:28 PM          Prior to Admission medications    Medication Sig Start Date End Date Taking? Authorizing Provider   hydroCHLOROthiazide (HYDRODIURIL) 25 mg tablet TAKE 1 TABLET BY MOUTH EVERY DAY 10/20/20  Yes Hernesto Bassett MD   celecoxib (CELEBREX) 200 mg capsule TAKE 1 CAPSULE BY MOUTH EVERY DAY 6/15/20  Yes Hernesto Bassett MD   atorvastatin (LIPITOR) 10 mg tablet TAKE 1 TABLET BY MOUTH EVERY DAY 6/5/20  Yes Hernesto Bassett MD   amoxicillin (AMOXIL) 500 mg capsule TAKE 4 CAPSULES BY MOUTH 1 HOUR PRIOR TO DENTAL APPT 8/1/19  Yes Provider, Historical   multivitamin (ONE A DAY) tablet Take 1 Tab by mouth daily. Yes Provider, Historical   senna-docusate (PERICOLACE) 8.6-50 mg per tablet Take 1 Tab by mouth two (2) times a day. Indications: constipation 11/13/20 12/14/20  DEMETRIUS Grimaldo   tamsulosin [facility]) 0.4 mg capsule Take 1 Cap by mouth daily. Indications: enlarged prostate with urination problem 11/14/20 12/14/20  DEMETRIUS Grimaldo          Allergies   Allergen Reactions    Acetaminophen Other (comments)     UNABLE TO URINATE, CAUSES FLANK PAIN           Review of Systems   Respiratory: Negative for cough and shortness of breath. Cardiovascular: Negative for chest pain and palpitations.    Gastrointestinal: Negative for abdominal pain, constipation, diarrhea, nausea and vomiting. Genitourinary: Positive for frequency. Negative for dysuria, hematuria and urgency. Musculoskeletal: Positive for joint pain. Objective:   Physical Exam  Cardiovascular:      Rate and Rhythm: Regular rhythm. Heart sounds: Normal heart sounds. No murmur. Pulmonary:      Effort: Pulmonary effort is normal.      Breath sounds: Normal breath sounds. No wheezing or rales. Abdominal:      General: Bowel sounds are normal.      Palpations: Abdomen is soft. There is no mass. Tenderness: There is no abdominal tenderness. Musculoskeletal:      Comments: R arm in a sling           Visit Vitals  /72   Pulse 81   Temp 98 °F (36.7 °C) (Temporal)   Resp 16   Ht 6' 1\" (1.854 m)   Wt 167 lb (75.8 kg)   SpO2 95%   BMI 22.03 kg/m²         Disclaimer:  Advised him to call back or return to office if symptoms worsen/change/persist.  Discussed expected course/resolution/complications of diagnosis in detail with patient. Medication risks/benefits/costs/interactions/alternatives discussed with patient. He was given an after visit summary which includes diagnoses, current medications, & vitals. He expressed understanding with the diagnosis and plan. Aspects of this note may have been generated using voice recognition software. Despite editing, there may be some syntax errors.        Lolita Guardado MD

## 2020-12-15 NOTE — PROGRESS NOTES
Results released to patient via [id number]. All labs are stable or at goal for him. Low K has resolved.

## 2020-12-17 ENCOUNTER — PATIENT OUTREACH (OUTPATIENT)
Dept: CASE MANAGEMENT | Age: 75
End: 2020-12-17

## 2020-12-17 NOTE — PROGRESS NOTES
Patient has graduated from the Transitions of Care Coordination  program on 12/17/2020. Patient/family has the ability to self-manage at this time Care management goals have been completed. Patient was not referred to the [facility] team for further management. Goals Addressed                 This Visit's Progress     COMPLETED: Prevent complications post hospitalization. 11/16/20   CTN spoke to wife to review discharge instructions/red flags to prevent readmission    Appts:    PCP Dr Lourdes Arango states she will make follow up appt today as she needs to reschedule a dermatology appt as well. She did not want the appts to conflict. Dr Lizzette Stein ortho--follow up appt already scheduled for 11/19/2020 per wife. She states they will give them information regarding the OP rehab at that time. Luciano MELCHOR--urology, patient has appt to follow up to have lamb removed tomorrow 11/17/2020     Wife reports patient doing well post operatively. She states patient is performing exercises as shown, denies fever or uncontrolled pain at time of call.  Wife reports catheter draining without complications, urine clear. She has no questions at this time regarding catheter care.  Wife reports they picked up all discharge medications and understand directions for use.  Wife has no questions at this time regarding discharge post op instructions, she states that it was well explained prior to surgery and they have no questions /concerns at this time. CTN will follow up with patient in  10-14 days for updates. Instructed wife to call CTN if she has any concerns that come up. Wife verbalizes understanding. ---sil    12/09/20  CTN spoke to patient today to follow up on recovery and inquire about any needs/concerns pt may have. Patient reports he is doing \"great\". He states he had lamb catheter removed a couple of weeks ago and has no problems with urination since then.  Denies burning , stinging, or frequency. Patient report shoulder recovery is \"moving along fine. \" He reports attending OP PT and \"they say I'm improving,\" referring to his shoulder mobility. Patient states he has seen Dr Kaiser Ryder and has a follow up with PCP next week. Patient has no new issues or concerns for CTN to follow up on at time of call. CTN will follow up next week--sil    12/17/20  End transition of care episode, patient has had no readmissions during 30 day period---rw            Patient has Care Transition Nurse's contact information for any further questions, concerns, or needs. Patients upcoming visits:  No future appointments.

## 2021-04-14 DIAGNOSIS — I10 HYPERTENSION, ESSENTIAL: ICD-10-CM

## 2021-04-14 RX ORDER — HYDROCHLOROTHIAZIDE 25 MG/1
TABLET ORAL
Qty: 90 TAB | Refills: 0 | Status: SHIPPED | OUTPATIENT
Start: 2021-04-14 | End: 2021-07-11

## 2021-06-06 RX ORDER — ATORVASTATIN CALCIUM 10 MG/1
TABLET, FILM COATED ORAL
Qty: 90 TABLET | Refills: 0 | Status: SHIPPED | OUTPATIENT
Start: 2021-06-06 | End: 2021-08-30

## 2021-07-06 RX ORDER — CELECOXIB 200 MG/1
CAPSULE ORAL
Qty: 90 CAPSULE | Refills: 0 | Status: SHIPPED | OUTPATIENT
Start: 2021-07-06 | End: 2021-10-12

## 2021-07-06 NOTE — TELEPHONE ENCOUNTER
Requested Prescriptions     Pending Prescriptions Disp Refills    celecoxib (CELEBREX) 200 mg capsule 90 Capsule 1     Sig: TAKE 1 CAPSULE BY MOUTH EVERY DAY     [facility] [id number] - [address] AT [facility] & [address] [facility] [address].   [phone]

## 2021-07-11 DIAGNOSIS — I10 HYPERTENSION, ESSENTIAL: ICD-10-CM

## 2021-07-11 RX ORDER — HYDROCHLOROTHIAZIDE 25 MG/1
TABLET ORAL
Qty: 90 TABLET | Refills: 0 | Status: SHIPPED | OUTPATIENT
Start: 2021-07-11 | End: 2021-11-07

## 2021-08-04 ENCOUNTER — OFFICE VISIT (OUTPATIENT)
Dept: INTERNAL MEDICINE CLINIC | Age: 76
End: 2021-08-04
Payer: MEDICARE

## 2021-08-04 VITALS
HEART RATE: 97 BPM | RESPIRATION RATE: 18 BRPM | OXYGEN SATURATION: 99 % | WEIGHT: 165 LBS | HEIGHT: 72 IN | TEMPERATURE: 97.8 F | DIASTOLIC BLOOD PRESSURE: 73 MMHG | BODY MASS INDEX: 22.35 KG/M2 | SYSTOLIC BLOOD PRESSURE: 111 MMHG

## 2021-08-04 DIAGNOSIS — N40.1 BENIGN PROSTATIC HYPERPLASIA WITH NOCTURIA: ICD-10-CM

## 2021-08-04 DIAGNOSIS — R35.1 BENIGN PROSTATIC HYPERPLASIA WITH NOCTURIA: ICD-10-CM

## 2021-08-04 DIAGNOSIS — L89.151 PRESSURE INJURY OF SACRAL REGION, STAGE 1: ICD-10-CM

## 2021-08-04 DIAGNOSIS — E78.00 PURE HYPERCHOLESTEROLEMIA: ICD-10-CM

## 2021-08-04 DIAGNOSIS — I10 HYPERTENSION, ESSENTIAL: Primary | ICD-10-CM

## 2021-08-04 DIAGNOSIS — R73.03 PRE-DIABETES: ICD-10-CM

## 2021-08-04 DIAGNOSIS — M15.9 GENERALIZED OSTEOARTHRITIS: ICD-10-CM

## 2021-08-04 PROCEDURE — G0463 HOSPITAL OUTPT CLINIC VISIT: HCPCS | Performed by: INTERNAL MEDICINE

## 2021-08-04 PROCEDURE — G8420 CALC BMI NORM PARAMETERS: HCPCS | Performed by: INTERNAL MEDICINE

## 2021-08-04 PROCEDURE — G8536 NO DOC ELDER MAL SCRN: HCPCS | Performed by: INTERNAL MEDICINE

## 2021-08-04 PROCEDURE — G8752 SYS BP LESS 140: HCPCS | Performed by: INTERNAL MEDICINE

## 2021-08-04 PROCEDURE — G8510 SCR DEP NEG, NO PLAN REQD: HCPCS | Performed by: INTERNAL MEDICINE

## 2021-08-04 PROCEDURE — G8427 DOCREV CUR MEDS BY ELIG CLIN: HCPCS | Performed by: INTERNAL MEDICINE

## 2021-08-04 PROCEDURE — G8754 DIAS BP LESS 90: HCPCS | Performed by: INTERNAL MEDICINE

## 2021-08-04 PROCEDURE — 1101F PT FALLS ASSESS-DOCD LE1/YR: CPT | Performed by: INTERNAL MEDICINE

## 2021-08-04 PROCEDURE — 99214 OFFICE O/P EST MOD 30 MIN: CPT | Performed by: INTERNAL MEDICINE

## 2021-08-04 NOTE — ASSESSMENT & PLAN NOTE
Chronic issue, overall stable, slightly worse in the R knee, will continue Celebrex, agree w/ talking to surgeon regarding surgery this winter

## 2021-08-04 NOTE — ASSESSMENT & PLAN NOTE
Stable, symptomatic but not effecting his quality of life, he has learned to live with it, reviewed treatment options and will defer meds unless something changes

## 2021-08-04 NOTE — PATIENT INSTRUCTIONS
Pressure Injuries: Care Instructions  Your Care Instructions     A pressure injury on the skin is caused by constant pressure to that area. These injuriesalso called decubitus ulcers or bedsoresmay happen when you lie in bed or sit in a wheelchair for a long time. The constant pressure blocks the blood supply to the skin. This causes skin cells to die and creates a sore. Pressure injuries usually occur over bony areas, such as the hips, lower back, elbows, heels, and shoulders. They also can occur in places where the skin folds over on itself. You may have mild redness or open sores that are harder to heal.  Good care at home can help heal pressure injuries. You or your caregiver needs to check your skin every day for sores. You need good nutrition and plenty of fluids to keep your skin healthy and prevent new pressure injuries. Follow-up care is a key part of your treatment and safety. Be sure to make and go to all appointments, and call your doctor if you are having problems. It's also a good idea to know your test results and keep a list of the medicines you take. How can you care for yourself at home? · If your doctor prescribed a medicated ointment or cream, use it exactly as prescribed. Call your doctor if you think you are having a problem with your medicine. · Wash pressure injuries every day, or as often as your doctor recommends. Most tap water is safe, but follow the advice of your doctor or nurse. He or she may recommend that you use a saline solution. This is a salt and water solution that you can buy over the counter. · Put on bandages as your doctor or wound care specialist says. · Keep healthy tissue around the sore clean and dry. · Check your skin every day for sores (or have a caregiver do it). · If you know what is causing the pressure that caused the sore, find a way to remove that pressure.   To prevent pressure injuries  · Change your position or have your caregiver help you change your position often. You may need to do this every 2 hours if you are in bed or every 15 minutes if you are in a wheelchair. This lowers the chance of making sores worse and getting new sores. · Use special mattresses or other support. These may include low-pressure mattresses or cushions made of foam that can be filled with air, water, beads, or fiber. · Eat healthy foods with plenty of protein to help heal damaged skin and to help new skin grow. · Try to stay at a healthy weight. Being overweight can lead to more pressure on your skin. · Do not slide across sheets or slump in a chair or bed. · Do not smoke. Smoking dries the skin and reduces its blood supply. If you need help quitting, talk to your doctor about stop-smoking programs and medicines. These can increase your chances of quitting for good. When should you call for help? Call your doctor now or seek immediate medical care if:    · You have signs of infection, such as:  ? Increased pain, swelling, warmth, or redness. ? Red streaks leading from the sore. ? Pus draining from the sore. ? A fever. Watch closely for changes in your health, and be sure to contact your doctor if:    · Your pressure injuries are not healing.     · You have new pressure injuries.     · You need help changing positions in bed or in a chair.     · Your caregiver needs help to move you. Where can you learn more? Go to http://www.gray.com/  Enter F114 in the search box to learn more about \"Pressure Injuries: Care Instructions. \"  Current as of: March 4, 2020               Content Version: 12.8  © 2006-2021 [employer]. Care instructions adapted under license by [employer] (which disclaims liability or warranty for this information).  If you have questions about a medical condition or this instruction, always ask your healthcare professional. [address] any warranty or liability for your use of this information.

## 2021-08-04 NOTE — PROGRESS NOTES
Mannie Arce is a 68 y.o. male who was seen in clinic today (8/4/2021). Assessment & Plan:   Below is the assessment and plan developed based on review of pertinent history, physical exam, labs, studies, and medications. 1. Hypertension, essential  Assessment & Plan:  well controlled, continue current treatment pending review of labs    Orders:  -     METABOLIC PANEL, COMPREHENSIVE  -     CBC W/O DIFF  2. Pure hypercholesterolemia  Assessment & Plan:  at goal, continue current treatment pending review of labs    Orders:  -     METABOLIC PANEL, COMPREHENSIVE  -     LIPID PANEL  3. Benign prostatic hyperplasia with nocturia  Assessment & Plan:  Stable, symptomatic but not effecting his quality of life, he has learned to live with it, reviewed treatment options and will defer meds unless something changes   4. Generalized osteoarthritis  Assessment & Plan:  Chronic issue, overall stable, slightly worse in the R knee, will continue Celebrex, agree w/ talking to surgeon regarding surgery this winter   5. Pre-diabetes  Assessment & Plan:  Has been stable in the past, will check labs, no diet changes at this time   Orders:  -     METABOLIC PANEL, COMPREHENSIVE  -     HEMOGLOBIN A1C WITH EAG  6. Pressure injury of sacral region, stage 1  Comments:  new dx, no signs of infection, reviewed barrier cream and using a pillow/donut, reviewed expectations & red flags    Follow-up and Dispositions    · Return in about 6 months (around 2/4/2022) for FULL PHYSICAL - 30 minutes. Subjective/Objective: Zee Zuniga was seen today for Hypertension, Cholesterol Problem, and Anal Pain      He was asked to schedule MWV in September, but is a month early to do [address] Since last visit: right knee pain is slightly worse    Cardiovascular Review  The patient has hypertension and hyperlipidemia. Since last visit: weight has decreased.   He reports taking medications as instructed, no medication side effects noted, patient does not perform home BP monitoring. Diet and Lifestyle: generally follows a low fat low cholesterol diet, generally follows a low sodium diet, exercises regularly. Labs: reviewed and discussed with patient.        Urology Review  He is here today because of BPH. He is not on any medications. Was on Flomax in the past but did not think it helped. His symptoms include: nocturia x 2-3. He denies: urinary hesitancy, urinary frequency, double voiding, weak stream.      Chronic Pain (> 3 months)  He RTC today to discuss his osteoarthritis that is affecting all joints.  Significant changes since last visit: he reports right knee is worse. He is thinking about getting TKR in the winter. He reports his symptoms are stable.  He is able to do his normal daily activities.  He reports using Celebrex 1 tab daily w/ good relief.            Prior to Admission medications    Medication Sig Start Date End Date Taking? Authorizing Provider   hydroCHLOROthiazide (HYDRODIURIL) 25 mg tablet TAKE 1 TABLET BY MOUTH EVERY DAY 7/11/21  Yes Home Monahan MD   celecoxib (CELEBREX) 200 mg capsule TAKE 1 CAPSULE BY MOUTH EVERY DAY 7/6/21  Yes Home Monahan MD   atorvastatin (LIPITOR) 10 mg tablet TAKE 1 TABLET BY MOUTH EVERY DAY 6/6/21  Yes Home Monahan MD   amoxicillin (AMOXIL) 500 mg capsule TAKE 4 CAPSULES BY MOUTH 1 HOUR PRIOR TO DENTAL APPT 8/1/19  Yes Provider, Historical   multivitamin (ONE A DAY) tablet Take 1 Tab by mouth daily. Yes Provider, Historical        Review of Systems   Gastrointestinal: Negative for abdominal pain, blood in stool, melena, nausea and vomiting. Denies diarrhea or constipation but loose stools on/off, will have a BM but not sure he is \"clean\". No incontinence. No diet changes or med changes recently   Skin:        Area on the buttock, sore to sit, no h/o trauma   All other systems reviewed and are negative.        Physical Exam  Cardiovascular:      Rate and Rhythm: Regular rhythm. Heart sounds: Normal heart sounds. No murmur heard. Pulmonary:      Effort: Pulmonary effort is normal.      Breath sounds: Normal breath sounds. No wheezing or rales. Abdominal:      General: Bowel sounds are normal.      Palpations: Abdomen is soft. There is no mass. Tenderness: There is no abdominal tenderness. Skin:     Comments: Tailbone area there is some erythema and a 1cm oval area of skin breakdown. No erythema. No odor. No discharge.  No fistula         Visit Vitals  /73   Pulse 97   Temp 97.8 °F (36.6 °C) (Temporal)   Resp 18   Ht 6' (1.829 m)   Wt 165 lb (74.8 kg)   SpO2 99%   BMI 22.38 kg/m²         Constanza Rico MD

## 2021-08-30 RX ORDER — ATORVASTATIN CALCIUM 10 MG/1
TABLET, FILM COATED ORAL
Qty: 90 TABLET | Refills: 0 | Status: SHIPPED | OUTPATIENT
Start: 2021-08-30 | End: 2021-11-28

## 2021-09-01 LAB
ALBUMIN SERPL-MCNC: 4.3 G/DL (ref 3.7–4.7)
ALBUMIN/GLOB SERPL: 1.6 {RATIO} (ref 1.2–2.2)
ALP SERPL-CCNC: 93 IU/L (ref 48–121)
ALT SERPL-CCNC: 14 IU/L (ref 0–44)
AST SERPL-CCNC: 15 IU/L (ref 0–40)
BILIRUB SERPL-MCNC: 0.6 MG/DL (ref 0–1.2)
BUN SERPL-MCNC: 24 MG/DL (ref 8–27)
BUN/CREAT SERPL: 24 (ref 10–24)
CALCIUM SERPL-MCNC: 10 MG/DL (ref 8.6–10.2)
CHLORIDE SERPL-SCNC: 99 MMOL/L (ref 96–106)
CHOLEST SERPL-MCNC: 177 MG/DL (ref 100–199)
CO2 SERPL-SCNC: 26 MMOL/L (ref 20–29)
CREAT SERPL-MCNC: 0.98 MG/DL (ref 0.76–1.27)
ERYTHROCYTE [DISTWIDTH] IN BLOOD BY AUTOMATED COUNT: 13.1 % (ref 11.6–15.4)
EST. AVERAGE GLUCOSE BLD GHB EST-MCNC: 128 MG/DL
GLOBULIN SER CALC-MCNC: 2.7 G/DL (ref 1.5–4.5)
GLUCOSE SERPL-MCNC: 128 MG/DL (ref 65–99)
HBA1C MFR BLD: 6.1 % (ref 4.8–5.6)
HCT VFR BLD AUTO: 45.3 % (ref 37.5–51)
HDLC SERPL-MCNC: 62 MG/DL
HGB BLD-MCNC: 15 G/DL (ref 13–17.7)
LDLC SERPL CALC-MCNC: 91 MG/DL (ref 0–99)
MCH RBC QN AUTO: 31.1 PG (ref 26.6–33)
MCHC RBC AUTO-ENTMCNC: 33.1 G/DL (ref 31.5–35.7)
MCV RBC AUTO: 94 FL (ref 79–97)
PLATELET # BLD AUTO: 287 X10E3/UL (ref 150–450)
POTASSIUM SERPL-SCNC: 4 MMOL/L (ref 3.5–5.2)
PROT SERPL-MCNC: 7 G/DL (ref 6–8.5)
RBC # BLD AUTO: 4.83 X10E6/UL (ref 4.14–5.8)
SODIUM SERPL-SCNC: 141 MMOL/L (ref 134–144)
TRIGL SERPL-MCNC: 136 MG/DL (ref 0–149)
VLDLC SERPL CALC-MCNC: 24 MG/DL (ref 5–40)
WBC # BLD AUTO: 10.3 X10E3/UL (ref 3.4–10.8)

## 2021-09-01 NOTE — PROGRESS NOTES
Results released to patient via [facility]. All labs are stable or at goal for him, except for FBS (1st time in DM range) and A1c is getting worse. Life style changes, will review medication options at f/u.

## 2021-10-12 RX ORDER — CELECOXIB 200 MG/1
CAPSULE ORAL
Qty: 90 CAPSULE | Refills: 0 | Status: SHIPPED | OUTPATIENT
Start: 2021-10-12 | End: 2022-01-25 | Stop reason: SDUPTHER

## 2021-11-03 DIAGNOSIS — I10 HYPERTENSION, ESSENTIAL: ICD-10-CM

## 2021-11-07 RX ORDER — HYDROCHLOROTHIAZIDE 25 MG/1
TABLET ORAL
Qty: 90 TABLET | Refills: 1 | Status: SHIPPED | OUTPATIENT
Start: 2021-11-07 | End: 2022-05-08

## 2021-11-09 ENCOUNTER — OFFICE VISIT (OUTPATIENT)
Dept: ORTHOPEDIC SURGERY | Age: 76
End: 2021-11-09
Payer: MEDICARE

## 2021-11-09 DIAGNOSIS — Z96.611 HISTORY OF TOTAL SHOULDER REPLACEMENT, RIGHT: Primary | ICD-10-CM

## 2021-11-09 DIAGNOSIS — G89.29 CHRONIC RIGHT SHOULDER PAIN: ICD-10-CM

## 2021-11-09 DIAGNOSIS — M25.511 CHRONIC RIGHT SHOULDER PAIN: ICD-10-CM

## 2021-11-09 DIAGNOSIS — M19.011 PRIMARY OSTEOARTHRITIS OF RIGHT SHOULDER: ICD-10-CM

## 2021-11-09 PROCEDURE — 1101F PT FALLS ASSESS-DOCD LE1/YR: CPT | Performed by: ORTHOPAEDIC SURGERY

## 2021-11-09 PROCEDURE — G8756 NO BP MEASURE DOC: HCPCS | Performed by: ORTHOPAEDIC SURGERY

## 2021-11-09 PROCEDURE — G8427 DOCREV CUR MEDS BY ELIG CLIN: HCPCS | Performed by: ORTHOPAEDIC SURGERY

## 2021-11-09 PROCEDURE — G8536 NO DOC ELDER MAL SCRN: HCPCS | Performed by: ORTHOPAEDIC SURGERY

## 2021-11-09 PROCEDURE — 99213 OFFICE O/P EST LOW 20 MIN: CPT | Performed by: ORTHOPAEDIC SURGERY

## 2021-11-09 PROCEDURE — G8420 CALC BMI NORM PARAMETERS: HCPCS | Performed by: ORTHOPAEDIC SURGERY

## 2021-11-09 PROCEDURE — G8432 DEP SCR NOT DOC, RNG: HCPCS | Performed by: ORTHOPAEDIC SURGERY

## 2021-11-09 RX ORDER — TRAMADOL HYDROCHLORIDE 50 MG/1
TABLET ORAL
COMMUNITY
Start: 2021-11-05 | End: 2022-01-19

## 2021-11-09 RX ORDER — SODIUM, POTASSIUM,MAG SULFATES 17.5-3.13G
SOLUTION, RECONSTITUTED, ORAL ORAL
COMMUNITY
Start: 2021-11-08 | End: 2021-12-16

## 2021-11-09 NOTE — PROGRESS NOTES
Corona Nick (: 1945) is a 68 y.o. male, patient, here for evaluation of the following chief complaint(s):  Shoulder Pain (status post total shoulder arthroplasty)       HPI:    Patient returns the office now status post total shoulder replacement of the right. He is 1 year out from surgery. He is doing well. He has returned back to playing competitive tennis. He does still note some level of loss of full range of motion overhead but he states that is much better compared to where he was prior and he has no pain in the shoulder. Allergies   Allergen Reactions    Acetaminophen Other (comments)     UNABLE TO URINATE, CAUSES FLANK PAIN       Current Outpatient Medications   Medication Sig    traMADoL (ULTRAM) 50 mg tablet     Suprep Bowel Prep Kit 17.5-3.13-1.6 gram solr oral solution     hydroCHLOROthiazide (HYDRODIURIL) 25 mg tablet TAKE 1 TABLET BY MOUTH EVERY DAY    celecoxib (CELEBREX) 200 mg capsule TAKE ONE CAPSULE BY MOUTH DAILY    atorvastatin (LIPITOR) 10 mg tablet TAKE 1 TABLET BY MOUTH EVERY DAY    amoxicillin (AMOXIL) 500 mg capsule TAKE 4 CAPSULES BY MOUTH 1 HOUR PRIOR TO DENTAL APPT    multivitamin (ONE A DAY) tablet Take 1 Tab by mouth daily. No current facility-administered medications for this visit.        Past Medical History:   Diagnosis Date    Arthritis     SHOULDER, R KNEE, R THUMB, LOWER BACK     Basal cell cancer     multiple    Chronic pain      AND L SHOULDER, LOWER BACK    Hyperlipidemia     Hypertension     Melanoma ([facility] [address]     multiple    Squamous cell carcinoma     multiple        Past Surgical History:   Procedure Laterality Date    HX COLONOSCOPY  2006    HX COLONOSCOPY  2016    diverticulosis; otherwise normal    HX CYST REMOVAL Left     FOOT    HX HERNIA REPAIR      bilateral     HX KNEE REPLACEMENT Left     HX KNEE REPLACEMENT Left 16    due to failed replacement    HX OTHER SURGICAL  2019    banding of hemorrhoids    HX SHOULDER REPLACEMENT Right 2020    HX TONSILLECTOMY      HX WISDOM TEETH EXTRACTION         Family History   Problem Relation Age of Onset    Stroke Mother     Cancer Mother         breast    Heart Failure Father     No Known Problems Daughter     Anesth Problems Neg Hx         Social History     Socioeconomic History    Marital status:      Spouse name: Not on file    Number of children: Not on file    Years of education: Not on file    Highest education level: Not on file   Occupational History    Not on file   Tobacco Use    Smoking status: Former Smoker     Packs/day: 2.00     Years: 15.00     Pack years: 30.00     Types: Cigarettes     Quit date: 1970     Years since quittin.4    Smokeless tobacco: Never Used   Vaping Use    Vaping Use: Never used   Substance and Sexual Activity    Alcohol use: Yes     Alcohol/week: 4.0 - 5.0 standard drinks     Types: 4 - 5 Cans of beer per week     Comment: occasional    Drug use: Yes     Types: Marijuana    Sexual activity: Not Currently     Partners: Female     Birth control/protection: Abstinence   Other Topics Concern    Not on file   Social History Narrative    Not on file     Social Determinants of Health     Financial Resource Strain:     Difficulty of Paying Living Expenses: Not on file   Food Insecurity:     Worried About Running Out of Food in the Last Year: Not on file    Francie of Food in the Last Year: Not on file   Transportation Needs:     Lack of Transportation (Medical): Not on file    Lack of Transportation (Non-Medical):  Not on file   Physical Activity:     Days of Exercise per Week: Not on file    Minutes of Exercise per Session: Not on file   Stress:     Feeling of Stress : Not on file   Social Connections:     Frequency of Communication with Friends and Family: Not on file    Frequency of Social Gatherings with Friends and Family: Not on file    Attends Orthodox Services: Not on file   [facility] or Organizations: Not on file    Attends Club or Organization Meetings: Not on file    Marital Status: Not on file   Intimate Partner Violence:     Fear of Current or Ex-Partner: Not on file    Emotionally Abused: Not on file    Physically Abused: Not on file    Sexually Abused: Not on file   Housing Stability:     Unable to Pay for Housing in the Last Year: Not on file    Number of [place] in the Last Year: Not on file    Unstable Housing in the Last Year: Not on file       Review of Systems   Musculoskeletal:        Status post shoulder arthroplasty       Vitals: There were no vitals taken for this visit. There is no height or weight on file to calculate BMI. Ortho Exam     Right shoulder: Incision is healed. He has no effusion. He has about 155 degrees of forward elevation, 100 degrees lateral duction and 70 degrees of external rotation. Internal rotation is to his lower lumbar level. He has excellent strength with forward elevation, lateral abduction as well as external rotation. He has no crepitation or pain. There is no swelling noted distally. Neurovascular examination is intact. Left shoulder: Patient has near full range of motion. He has no pain. He has no strength loss. He has no crepitation. Neurovascular examination is intact. 3 view X-ray evaluation of the right shoulder reveals a well-seated prosthesis no lucency is identified. ASSESSMENT/PLAN:    Patient is doing quite well. He is to continue with range of motion and strengthening exercises. I reminded him to take antibiotic prior to dental procedures. He is to return to the office in 1 year for repeat x-ray. He is to call if he develops any unusual pain.         Kathryn Sexton MD

## 2021-11-12 ENCOUNTER — TELEPHONE (OUTPATIENT)
Dept: INTERNAL MEDICINE CLINIC | Age: 76
End: 2021-11-12

## 2021-11-12 NOTE — TELEPHONE ENCOUNTER
Reason for call:  Dixon Tidwell called stated that there is some swelling in his right index finger    States he has to be super careful when it comes to infections due to having an artificial knee and shoulder     Currently has an antibiotics; would like to know how much he can take until he is able to see a pcp    Is this a new problem: yes     Date of last appointment:  8/4/2021     Can we respond via [employer]: no    Best call back number: [phone]

## 2021-11-15 ENCOUNTER — HOSPITAL ENCOUNTER (OUTPATIENT)
Dept: PREADMISSION TESTING | Age: 76
Discharge: HOME | End: 2021-11-15
Attending: COLON & RECTAL SURGERY
Payer: MEDICARE

## 2021-11-15 ENCOUNTER — TRANSCRIBE ORDER (OUTPATIENT)
Dept: REGISTRATION | Age: 76
End: 2021-11-15

## 2021-11-15 DIAGNOSIS — Z01.812 PRE-PROCEDURE LAB EXAM: Primary | ICD-10-CM

## 2021-11-15 DIAGNOSIS — Z01.812 PRE-PROCEDURE LAB EXAM: ICD-10-CM

## 2021-11-15 PROCEDURE — U0005 INFEC AGEN DETEC AMPLI PROBE: HCPCS

## 2021-11-15 NOTE — TELEPHONE ENCOUNTER
Call to patient. He had wanted to ask if he could take Amoxicillin he had for his dental procedure and for how long. Told him he should never start antibiotics on his own; would need to be seen. At any rate, he went to [facility], was started on Bactrim. Says feeling a little better.   Have called and requested record (not completed)  from [facility].

## 2021-11-16 LAB
SARS-COV-2, XPLCVT: NOT DETECTED
SOURCE, COVRS: NORMAL

## 2021-11-19 ENCOUNTER — ANESTHESIA (OUTPATIENT)
Dept: ENDOSCOPY | Age: 76
End: 2021-11-19
Payer: MEDICARE

## 2021-11-19 ENCOUNTER — HOSPITAL ENCOUNTER (EMERGENCY)
Age: 76
Discharge: HOME | End: 2021-11-19
Attending: EMERGENCY MEDICINE
Payer: MEDICARE

## 2021-11-19 ENCOUNTER — ANESTHESIA EVENT (OUTPATIENT)
Dept: ENDOSCOPY | Age: 76
End: 2021-11-19
Payer: MEDICARE

## 2021-11-19 ENCOUNTER — HOSPITAL ENCOUNTER (OUTPATIENT)
Age: 76
Setting detail: OUTPATIENT SURGERY
Discharge: HOME | End: 2021-11-19
Attending: COLON & RECTAL SURGERY | Admitting: COLON & RECTAL SURGERY
Payer: MEDICARE

## 2021-11-19 VITALS
WEIGHT: 155.7 LBS | HEART RATE: 72 BPM | HEIGHT: 74 IN | RESPIRATION RATE: 33 BRPM | OXYGEN SATURATION: 98 % | BODY MASS INDEX: 19.98 KG/M2 | DIASTOLIC BLOOD PRESSURE: 81 MMHG | SYSTOLIC BLOOD PRESSURE: 127 MMHG | TEMPERATURE: 99.1 F

## 2021-11-19 VITALS
SYSTOLIC BLOOD PRESSURE: 140 MMHG | HEART RATE: 104 BPM | DIASTOLIC BLOOD PRESSURE: 91 MMHG | OXYGEN SATURATION: 99 % | RESPIRATION RATE: 18 BRPM | TEMPERATURE: 98.3 F

## 2021-11-19 DIAGNOSIS — R33.9 URINARY RETENTION: Primary | ICD-10-CM

## 2021-11-19 LAB
APPEARANCE UR: CLEAR
BACTERIA URNS QL MICRO: NEGATIVE /HPF
BILIRUB UR QL: NEGATIVE
COLOR UR: ABNORMAL
EPITH CASTS URNS QL MICRO: ABNORMAL /LPF
GLUCOSE UR STRIP.AUTO-MCNC: NEGATIVE MG/DL
HGB UR QL STRIP: ABNORMAL
HYALINE CASTS URNS QL MICRO: ABNORMAL /LPF (ref 0–5)
KETONES UR QL STRIP.AUTO: 15 MG/DL
LEUKOCYTE ESTERASE UR QL STRIP.AUTO: NEGATIVE
NITRITE UR QL STRIP.AUTO: NEGATIVE
PH UR STRIP: 5.5 [PH] (ref 5–8)
PROT UR STRIP-MCNC: NEGATIVE MG/DL
RBC #/AREA URNS HPF: ABNORMAL /HPF (ref 0–5)
SP GR UR REFRACTOMETRY: 1.01 (ref 1–1.03)
UR CULT HOLD, URHOLD: NORMAL
UROBILINOGEN UR QL STRIP.AUTO: 0.2 EU/DL (ref 0.2–1)
WBC URNS QL MICRO: ABNORMAL /HPF (ref 0–4)

## 2021-11-19 PROCEDURE — 77030003657 HC NDL SCLER BSC -B: Performed by: COLON & RECTAL SURGERY

## 2021-11-19 PROCEDURE — 2709999900 HC NON-CHARGEABLE SUPPLY: Performed by: COLON & RECTAL SURGERY

## 2021-11-19 PROCEDURE — 88305 TISSUE EXAM BY PATHOLOGIST: CPT

## 2021-11-19 PROCEDURE — 74011250636 HC RX REV CODE- 250/636: Performed by: NURSE ANESTHETIST, CERTIFIED REGISTERED

## 2021-11-19 PROCEDURE — 77030005513 HC CATH URETH FOL11 MDII -B

## 2021-11-19 PROCEDURE — 81001 URINALYSIS AUTO W/SCOPE: CPT

## 2021-11-19 PROCEDURE — 74011250637 HC RX REV CODE- 250/637: Performed by: COLON & RECTAL SURGERY

## 2021-11-19 PROCEDURE — 76060000032 HC ANESTHESIA 0.5 TO 1 HR: Performed by: COLON & RECTAL SURGERY

## 2021-11-19 PROCEDURE — 76040000007: Performed by: COLON & RECTAL SURGERY

## 2021-11-19 PROCEDURE — C1713 ANCHOR/SCREW BN/BN,TIS/BN: HCPCS | Performed by: COLON & RECTAL SURGERY

## 2021-11-19 PROCEDURE — 77030013996 HC SNR POLYP ENDOSC OCOA -B: Performed by: COLON & RECTAL SURGERY

## 2021-11-19 PROCEDURE — 74011250636 HC RX REV CODE- 250/636: Performed by: COLON & RECTAL SURGERY

## 2021-11-19 RX ORDER — EPINEPHRINE 0.1 MG/ML
1 INJECTION INTRACARDIAC; INTRAVENOUS
Status: DISCONTINUED | OUTPATIENT
Start: 2021-11-19 | End: 2021-11-19 | Stop reason: HOSPADM

## 2021-11-19 RX ORDER — SODIUM CHLORIDE 0.9 % (FLUSH) 0.9 %
5-40 SYRINGE (ML) INJECTION EVERY 8 HOURS
Status: DISCONTINUED | OUTPATIENT
Start: 2021-11-19 | End: 2021-11-19 | Stop reason: HOSPADM

## 2021-11-19 RX ORDER — PROPOFOL 10 MG/ML
INJECTION, EMULSION INTRAVENOUS AS NEEDED
Status: DISCONTINUED | OUTPATIENT
Start: 2021-11-19 | End: 2021-11-19 | Stop reason: HOSPADM

## 2021-11-19 RX ORDER — ATROPINE SULFATE 0.1 MG/ML
0.5 INJECTION INTRAVENOUS
Status: DISCONTINUED | OUTPATIENT
Start: 2021-11-19 | End: 2021-11-19 | Stop reason: HOSPADM

## 2021-11-19 RX ORDER — PHENYLEPHRINE HCL IN 0.9% NACL 0.4MG/10ML
SYRINGE (ML) INTRAVENOUS AS NEEDED
Status: DISCONTINUED | OUTPATIENT
Start: 2021-11-19 | End: 2021-11-19 | Stop reason: HOSPADM

## 2021-11-19 RX ORDER — NALOXONE HYDROCHLORIDE 0.4 MG/ML
0.4 INJECTION, SOLUTION INTRAMUSCULAR; INTRAVENOUS; SUBCUTANEOUS
Status: DISCONTINUED | OUTPATIENT
Start: 2021-11-19 | End: 2021-11-19 | Stop reason: HOSPADM

## 2021-11-19 RX ORDER — DEXTROMETHORPHAN/PSEUDOEPHED 2.5-7.5/.8
1.2 DROPS ORAL
Status: DISCONTINUED | OUTPATIENT
Start: 2021-11-19 | End: 2021-11-19 | Stop reason: HOSPADM

## 2021-11-19 RX ORDER — OXYCODONE AND ACETAMINOPHEN 5; 325 MG/1; MG/1
1 TABLET ORAL
COMMUNITY
End: 2022-01-04

## 2021-11-19 RX ORDER — SODIUM CHLORIDE 9 MG/ML
50 INJECTION, SOLUTION INTRAVENOUS CONTINUOUS
Status: DISCONTINUED | OUTPATIENT
Start: 2021-11-19 | End: 2021-11-19 | Stop reason: HOSPADM

## 2021-11-19 RX ORDER — FLUMAZENIL 0.1 MG/ML
0.2 INJECTION INTRAVENOUS
Status: DISCONTINUED | OUTPATIENT
Start: 2021-11-19 | End: 2021-11-19 | Stop reason: HOSPADM

## 2021-11-19 RX ORDER — SODIUM CHLORIDE 0.9 % (FLUSH) 0.9 %
5-40 SYRINGE (ML) INJECTION AS NEEDED
Status: DISCONTINUED | OUTPATIENT
Start: 2021-11-19 | End: 2021-11-19 | Stop reason: HOSPADM

## 2021-11-19 RX ADMIN — PROPOFOL 50 MG: 10 INJECTION, EMULSION INTRAVENOUS at 11:23

## 2021-11-19 RX ADMIN — SODIUM CHLORIDE: 900 INJECTION, SOLUTION INTRAVENOUS at 11:00

## 2021-11-19 RX ADMIN — PROPOFOL 50 MG: 10 INJECTION, EMULSION INTRAVENOUS at 11:26

## 2021-11-19 RX ADMIN — PROPOFOL 50 MG: 10 INJECTION, EMULSION INTRAVENOUS at 11:07

## 2021-11-19 RX ADMIN — PROPOFOL 50 MG: 10 INJECTION, EMULSION INTRAVENOUS at 11:18

## 2021-11-19 RX ADMIN — PROPOFOL 50 MG: 10 INJECTION, EMULSION INTRAVENOUS at 11:09

## 2021-11-19 RX ADMIN — Medication 80 MCG: at 11:16

## 2021-11-19 RX ADMIN — SIMETHICONE 80 MG: 20 SUSPENSION/ DROPS ORAL at 11:13

## 2021-11-19 RX ADMIN — Medication 80 MCG: at 11:13

## 2021-11-19 RX ADMIN — PROPOFOL 50 MG: 10 INJECTION, EMULSION INTRAVENOUS at 11:14

## 2021-11-19 RX ADMIN — Medication 80 MCG: at 11:21

## 2021-11-19 RX ADMIN — PROPOFOL 50 MG: 10 INJECTION, EMULSION INTRAVENOUS at 11:06

## 2021-11-19 NOTE — ED PROVIDER NOTES
51-year-old male with a PMH including arthritis, chronic pain, hyperlipidemia, hypertension, and melanoma presents to the ER today for evaluation of dysuria, difficulty urinating, and urinary retention. Patient states that he was in his normal state of health yesterday and received a colonoscopy early this morning. He states that following the colonoscopy he has had significant difficulty urinating to the point where he is in immense pain. He denies fever or vomiting. He does endorse requiring urinary catheters for urinary retention in the past.  Patient still has prostate.            Past Medical History:   Diagnosis Date    Arthritis     SHOULDER, R KNEE, R THUMB, LOWER BACK     Basal cell cancer     multiple    Chronic pain      AND L SHOULDER, LOWER BACK    Hyperlipidemia     Hypertension     Melanoma ([facility] [address]     multiple    Squamous cell carcinoma     multiple       Past Surgical History:   Procedure Laterality Date    COLONOSCOPY N/A 11/19/2021    COLONOSCOPY   :- performed by Gar[facility] at [facility] ENDOSCOPY    HX COLONOSCOPY  2006    HX COLONOSCOPY  07/25/2016    diverticulosis; otherwise normal    HX CYST REMOVAL Left 1961    FOOT    HX HERNIA REPAIR  2008    bilateral     HX KNEE REPLACEMENT Left 2010    HX KNEE REPLACEMENT Left 7/28/16    due to failed replacement    HX OTHER SURGICAL  03/06/2019    banding of hemorrhoids    HX SHOULDER REPLACEMENT Right 11/12/2020    HX TONSILLECTOMY      HX WISDOM TEETH EXTRACTION           Family History:   Problem Relation Age of Onset    Stroke Mother     Cancer Mother         breast    Heart Failure Father     No Known Problems Daughter     Anesth Problems Neg Hx        Social History     Socioeconomic History    Marital status:      Spouse name: Not on file    Number of children: Not on file    Years of education: Not on file    Highest education level: Not on file   Occupational History    Not on file   Tobacco Use  Smoking status: Former Smoker     Packs/day: 2.00     Years: 15.00     Pack years: 30.00     Types: Cigarettes     Quit date: 1970     Years since quittin.5    Smokeless tobacco: Never Used   Vaping Use    Vaping Use: Never used   Substance and Sexual Activity    Alcohol use: Yes     Alcohol/week: 4.0 - 5.0 standard drinks     Types: 4 - 5 Cans of beer per week     Comment: occasional    Drug use: Yes     Types: Marijuana    Sexual activity: Not Currently     Partners: Female     Birth control/protection: Abstinence   Other Topics Concern    Not on file   Social History Narrative    Not on file     Social Determinants of Health     Financial Resource Strain:     Difficulty of Paying Living Expenses: Not on file   Food Insecurity:     Worried About Running Out of Food in the Last Year: Not on file    Francie of Food in the Last Year: Not on file   Transportation Needs:     Lack of Transportation (Medical): Not on file    Lack of Transportation (Non-Medical):  Not on file   Physical Activity:     Days of Exercise per Week: Not on file    Minutes of Exercise per Session: Not on file   Stress:     Feeling of Stress : Not on file   Social Connections:     Frequency of Communication with Friends and Family: Not on file    Frequency of Social Gatherings with Friends and Family: Not on file    Attends [employer] Services: Not on file    Active Member of [address] [place] [address] or Organizations: Not on file    Attends Club or Organization Meetings: Not on file    Marital Status: Not on file   Intimate Partner Violence:     Fear of Current or Ex-Partner: Not on file    Emotionally Abused: Not on file    Physically Abused: Not on file    Sexually Abused: Not on file   Housing Stability:     Unable to Pay for Housing in the Last Year: Not on file    Number of [place] in the Last Year: Not on file    Unstable Housing in the Last Year: Not on file         ALLERGIES: Acetaminophen    Review of Systems Constitutional: Negative for fever. HENT: Negative for sore throat. Eyes: Negative for visual disturbance. Respiratory: Negative for shortness of breath. Cardiovascular: Negative for palpitations. Gastrointestinal: Positive for abdominal pain. Negative for vomiting. Genitourinary: Positive for difficulty urinating and dysuria. Negative for flank pain. Musculoskeletal: Negative for myalgias. Skin: Negative for rash. Neurological: Negative for dizziness. Psychiatric/Behavioral: Negative for dysphoric mood. Vitals:    11/19/21 1820   BP: (!) 140/91   Pulse: (!) 104   Resp: 18   Temp: 98.3 °F (36.8 °C)   SpO2: 99%            Physical Exam  Vitals and nursing note reviewed. Constitutional:       General: He is in acute distress. Appearance: Normal appearance. He is not ill-appearing. HENT:      Head: Normocephalic and atraumatic. Nose: Nose normal.      Mouth/Throat:      Mouth: Mucous membranes are moist.      Pharynx: Oropharynx is clear. Eyes:      Extraocular Movements: Extraocular movements intact. Cardiovascular:      Rate and Rhythm: Normal rate and regular rhythm. Pulses: Normal pulses. Heart sounds: Normal heart sounds. Pulmonary:      Effort: Pulmonary effort is normal.      Breath sounds: Normal breath sounds. Abdominal:      General: Bowel sounds are normal. There is distension. Palpations: Abdomen is soft. Tenderness: There is abdominal tenderness in the suprapubic area. Comments: Moderate bladder distention with significant pain to palpation   Musculoskeletal:         General: Normal range of motion. Cervical back: Normal range of motion and neck supple. Skin:     General: Skin is warm and dry. Neurological:      Mental Status: He is alert and oriented to person, place, and time. Mental status is at baseline.    Psychiatric:         Mood and Affect: Mood normal.         Behavior: Behavior normal.          [facility]     VITAL SIGNS:  Patient Vitals for the past 4 hrs:   Temp Pulse Resp BP SpO2   11/19/21 1820 98.3 °F (36.8 °C) (!) 104 18 (!) 140/91 99 %         LABS:  Recent Results (from the past 6 hour(s))   URINALYSIS W/MICROSCOPIC    Collection Time: 11/19/21  6:56 PM   Result Value Ref Range    Color YELLOW/STRAW      Appearance CLEAR CLEAR      Specific gravity 1.013 1.003 - 1.030      pH (UA) 5.5 5.0 - 8.0      Protein Negative NEG mg/dL    Glucose Negative NEG mg/dL    Ketone 15 (A) NEG mg/dL    Bilirubin Negative NEG      Blood TRACE (A) NEG      Urobilinogen 0.2 0.2 - 1.0 EU/dL    Nitrites Negative NEG      Leukocyte Esterase Negative NEG      WBC 0-4 0 - 4 /hpf    RBC 0-5 0 - 5 /hpf    Epithelial cells FEW FEW /lpf    Bacteria Negative NEG /hpf    Hyaline cast 0-2 0 - 5 /lpf   URINE CULTURE HOLD SAMPLE    Collection Time: 11/19/21  6:56 PM    Specimen: Serum; Urine   Result Value Ref Range    Urine culture hold        Urine on hold in Microbiology dept for 2 days. If unpreserved urine is submitted, it cannot be used for addtional testing after 24 hours, recollection will be required. IMAGING:  No orders to display         Medications During Visit:  Medications - No data to display      DECISION MAKING:  Samuel Ramirez is a 68 y.o. male who comes in as above. Catheter placed with immediate output of approximately 700 mL. Full resolution of pain following bladder decompression. Plan:  Close follow-up with urology for catheter removal.  Continue 10-day course of Bactrim that patient is taking for paronychia. The clinical decision making for this encounter included ordering and interpreting the above diagnostic tests with comparison to prior studies that are within our EMR. Past medical and surgical histories were reviewed, as were records from recent outpatient and emergency department visits. The above results discussed and reviewed with the patient.   Patient verbalized understanding of the care plan, including any changes to current outpatient medication regimen, discussed disease process, symptom control, and follow-up care. Return precautions reviewed. IMPRESSION:  1. Urinary retention        DISPOSITION:  Discharged      Current Discharge Medication List           Follow-up Information     Follow up With Specialties Details Why Contact Info    Owen Cai MD Urology Schedule an appointment as soon as possible for a visit   [address] [place] [address] [address]  [phone]              The patient is asked to follow-up with their primary care provider in the next several days. They are to call tomorrow for an appointment. The patient is asked to return promptly for any increased concerns or worsening of symptoms. They can return to this emergency department or any other emergency department.     Procedures

## 2021-11-19 NOTE — ANESTHESIA POSTPROCEDURE EVALUATION
Post-Anesthesia Evaluation and Assessment    Patient: Dominic Stock MRN: [id number]  SSN: [id number]    YOB: 1945  Age: 68 y.o. Sex: male      I have evaluated the patient and they are stable and ready for discharge from the PACU. Cardiovascular Function/Vital Signs  Visit Vitals  BP 92/60   Pulse 70   Temp 37.3 °C (99.1 °F)   Resp 18   Ht 6' 2\" (1.88 m)   Wt 70.6 kg (155 lb 11.2 oz)   SpO2 100%   BMI 19.99 kg/m²       Patient is status post MAC anesthesia for Procedure(s):  COLONOSCOPY   :-  ENDOSCOPIC POLYPECTOMY  INJECTION. Nausea/Vomiting: None    Postoperative hydration reviewed and adequate. Pain:  Pain Scale 1: Numeric (0 - 10) (11/19/21 1142)  Pain Intensity 1: 0 (11/19/21 1142)   Managed    Neurological Status: At baseline    Mental Status, Level of Consciousness: Alert and  oriented to person, place, and time    Pulmonary Status:   O2 Device: CO2 nasal cannula (11/19/21 1130)   Adequate oxygenation and airway patent    Complications related to anesthesia: None    Post-anesthesia assessment completed. No concerns    Signed By: Matt Wang MD     November 19, 2021              Procedure(s):  COLONOSCOPY   :-  ENDOSCOPIC POLYPECTOMY  INJECTION. MAC    <BSHSIANPOST>    INITIAL Post-op Vital signs:   Vitals Value Taken Time   BP     Temp     Pulse 73 11/19/21 1142   Resp 16 11/19/21 1142   SpO2 93 % 11/19/21 1142   Vitals shown include unvalidated device data.

## 2021-11-19 NOTE — H&P
Colon and Rectal Surgery History and Physical    Subjective: Juan Fraga is a 68 y.o. male who is here for screening    Patient Active Problem List    Diagnosis Date Noted    Chronic right shoulder pain 2021    Generalized osteoarthritis 2021    OA (osteoarthritis) of shoulder 2020    Grade II hemorrhoids 2019    Benign prostatic hyperplasia with nocturia 10/10/2018    RBBB (right bundle branch block) 2016    Chronic lower back pain 2015    Pre-diabetes 2014    Skin cancer 2014    Hypertension, essential     Hyperlipidemia      Past Medical History:   Diagnosis Date    Arthritis     SHOULDER, R KNEE, R THUMB, LOWER BACK     Basal cell cancer     multiple    Chronic pain      AND L SHOULDER, LOWER BACK    Hyperlipidemia     Hypertension     Melanoma ([facility] [place] [address]     multiple    Squamous cell carcinoma     multiple      Past Surgical History:   Procedure Laterality Date    HX COLONOSCOPY  2006    HX COLONOSCOPY  2016    diverticulosis; otherwise normal    HX CYST REMOVAL Left     FOOT    HX HERNIA REPAIR      bilateral     HX KNEE REPLACEMENT Left     HX KNEE REPLACEMENT Left 16    due to failed replacement    HX OTHER SURGICAL  2019    banding of hemorrhoids    HX SHOULDER REPLACEMENT Right 2020    HX TONSILLECTOMY      HX WISDOM TEETH EXTRACTION        Social History     Tobacco Use    Smoking status: Former Smoker     Packs/day: 2.00     Years: 15.00     Pack years: 30.00     Types: Cigarettes     Quit date: 1970     Years since quittin.5    Smokeless tobacco: Never Used   Substance Use Topics    Alcohol use:  Yes     Alcohol/week: 4.0 - 5.0 standard drinks     Types: 4 - 5 Cans of beer per week     Comment: occasional      Family History   Problem Relation Age of Onset    Stroke Mother     Cancer Mother         breast    Heart Failure Father     No Known Problems Daughter    [place] Problems Neg Hx       Prior to Admission medications    Medication Sig Start Date End Date Taking? Authorizing Provider   oxyCODONE-acetaminophen (Percocet) 5-325 mg per tablet Take 1 Tablet by mouth every four (4) hours as needed for Pain. Yes Provider, Historical   traMADoL (ULTRAM) 50 mg tablet  11/5/21  Yes Provider, Historical   Suprep Bowel Prep Kit 17.5-3.13-1.6 gram solr oral solution  11/8/21  Yes Provider, Historical   atorvastatin (LIPITOR) 10 mg tablet TAKE 1 TABLET BY MOUTH EVERY DAY 8/30/21  Yes Aneudy Rubin MD   hydroCHLOROthiazide (HYDRODIURIL) 25 mg tablet TAKE 1 TABLET BY MOUTH EVERY DAY 11/7/21   Aneudy Rubin MD   celecoxib (CELEBREX) 200 mg capsule TAKE ONE CAPSULE BY MOUTH DAILY 10/12/21   Rachell Duncan NP   amoxicillin (AMOXIL) 500 mg capsule TAKE 4 CAPSULES BY MOUTH 1 HOUR PRIOR TO DENTAL APPT 8/1/19   Provider, Historical   multivitamin (ONE A DAY) tablet Take 1 Tab by mouth daily. Provider, Historical     Allergies   Allergen Reactions    Acetaminophen Other (comments)     UNABLE TO URINATE, CAUSES FLANK PAIN        Review of Systems:    A comprehensive review of systems was negative except for that written in the History of Present Illness. Objective:     Visit Vitals  /72   Pulse 79   Temp 99.1 °F (37.3 °C)   Resp 12   Ht 6' 2\" (1.88 m)   Wt 70.6 kg (155 lb 11.2 oz)   SpO2 96%   BMI 19.99 kg/m²        Physical Exam:   General:  Alert, cooperative, no distress, appears stated age. Head:  Normocephalic, without obvious abnormality, atraumatic. Eyes:  Conjunctivae/corneas clear. PERRL, EOMs intact. Ears:  Normal external ear canals both ears. Nose: Nares normal. Septum midline. No drainage. Throat: Lips, mucosa, and tongue normal. Teeth and gums normal.   Neck: Supple, symmetrical, trachea midline, no adenopathy   Back:   Symmetric, no curvature. ROM normal.   Lungs:   Clear   Chest wall:  No tenderness or deformity.    Heart:  Regular rate and rhythm       Abdomen:   Soft, non-tender. Bowel sounds normal. No masses,  No organomegaly. Genitalia:  deferred       Extremities: Extremities normal, atraumatic, no cyanosis or edema. Skin: Skin color, texture, turgor normal. No rashes or lesions. Neurologic: CNII-XII intact. Normal strength, sensation and reflexes throughout. Imaging:  images and reports reviewed    Lab Review:  No results found for this or any previous visit (from the past 24 hour(s)). Labs and radiology: images and reports reviewed      Assessment:     screening    Plan:     1. I recommend proceeding with colonoscopy. Treatment alternatives were discussed. 2. Discussed aspects of surgical intervention, methods, risks (including by not limited to infection, bleeding, hematoma, and perforation of the intestines or solid organs), and the risks of general anesthetic. The patient understands the risks; any and all questions were answered to the patient's satisfaction.     Signed By: Fermin Juarez MD     November 19, 2021

## 2021-11-19 NOTE — DISCHARGE INSTRUCTIONS
Jalil Bocanegra  [id number]  1945    COLON DISCHARGE INSTRUCTIONS  Discomfort:  Redness at IV site- apply warm compress to area; if redness or soreness persist- contact your physician  There may be a slight amount of blood passed from the rectum  Gaseous discomfort- walking, belching will help relieve any discomfort  You may not operate a vehicle for 12 hours  You may not engage in an occupation involving machinery or appliances for rest of today  You may not drink alcoholic beverages for at least 12 hours  Avoid making any critical decisions for at least 24 hour  DIET:   High fiber diet. - however -  remember your colon is empty and a heavy meal will produce gas. Avoid these foods:  vegetables, fried / greasy foods, carbonated drinks for today    MEDICATIONS:  Avoid blood thinners for one week       ACTIVITY:  You may resume your normal daily activities it is recommended that you spend the remainder of the day resting -  avoid any strenuous activity. CALL M.D. ANY SIGN OF:   Increasing pain, nausea, vomiting  Abdominal distension (swelling)  New increased bleeding (oral or rectal)  Fever (chills)  Pain in chest area  Bloody discharge from nose or mouth  Shortness of breath     Follow-up Instructions:   Call Scott Aly MD if any questions or problems. Telephone # [phone]  Biopsy results will be available in  7 to10 days  Should have a repeat colonoscopy in 1 year. COLONOSCOPY FINDINGS:  Your colonoscopy showed: one polyp, sigmoid diverticulosis, hemorrhoids, avm in the cecum. Patient Education   Patient Education   Patient Education        Hemorrhoids: Care Instructions  Overview     Hemorrhoids are swollen veins that develop in the anal canal. Bleeding during bowel movements, itching, and rectal pain are the most common symptoms. Hemorrhoids can be uncomfortable at times, but rarely are they a serious problem.   Most of the time, you can treat them with simple changes to your diet and bowel habits. These changes include eating more fiber and not straining to pass stools. Most hemorrhoids don't need surgery or other treatment unless they are very large and painful or bleed a lot. Follow-up care is a key part of your treatment and safety. Be sure to make and go to all appointments, and call your doctor if you are having problems. It's also a good idea to know your test results and keep a list of the medicines you take. How can you care for yourself at home? · Sit in a few inches of warm water (sitz bath) 3 times a day and after bowel movements. The warm water helps with pain and itching. · Put ice on your anal area several times a day for 10 minutes at a time. Put a thin cloth between the ice and your skin. Follow this by placing a warm, wet towel on the area for another 10 to 20 minutes. · Take pain medicines exactly as directed. ? If the doctor gave you a prescription medicine for pain, take it as prescribed. ? If you are not taking a prescription pain medicine, ask your doctor if you can take an over-the-counter medicine. · Keep the anal area clean, but be gentle. Use water and a fragrance-free soap, or use baby wipes or medicated pads such as Tucks. · Wear cotton underwear and loose clothing to decrease moisture in the anal area. · Eat more fiber. Include foods such as whole-grain breads and cereals, raw vegetables, raw and dried fruits, and beans. · Drink plenty of fluids. If you have kidney, heart, or liver disease and have to limit fluids, talk with your doctor before you increase the amount of fluids you drink. · Use a stool softener that contains bran or psyllium. You can save money by buying bran or psyllium (available in bulk at most health food stores) and sprinkling it on foods or stirring it into fruit juice. Or you can use a product such as Metamucil or Hydrocil. · Practice healthy bowel habits. ? Go to the bathroom as soon as you have the urge. ?  Avoid straining to pass stools. Relax and give yourself time to let things happen naturally. ? Do not hold your breath while passing stools. ? Do not read while sitting on the toilet. Get off the toilet as soon as you have finished. · Take your medicines exactly as prescribed. Call your doctor if you think you are having a problem with your medicine. When should you call for help? Call 911 anytime you think you may need emergency care. For example, call if:    · You pass maroon or very bloody stools. Call your doctor now or seek immediate medical care if:    · You have increased pain.     · You have increased bleeding. Watch closely for changes in your health, and be sure to contact your doctor if:    · Your symptoms have not improved after 3 or 4 days. Where can you learn more? Go to http://www.lackey.com/  Enter F228 in the search box to learn more about \"Hemorrhoids: Care Instructions. \"  Current as of: February 10, 2021               Content Version: 13.0  © 2006-2021 [employer]. Care instructions adapted under license by [employer] (which disclaims liability or warranty for this information). If you have questions about a medical condition or this instruction, always ask your healthcare professional. [address] any warranty or liability for your use of this information. Diverticulosis: Care Instructions  Your Care Instructions  In diverticulosis, pouches called diverticula form in the wall of the large intestine (colon). The pouches do not cause any pain or other symptoms. Most people who have diverticulosis do not know they have it. But the pouches sometimes bleed, and if they become infected, they can cause pain and other symptoms. When this happens, it is called diverticulitis. Diverticula form when pressure pushes the wall of the colon outward at certain weak points.  A diet that is too low in fiber can cause diverticula. Follow-up care is a key part of your treatment and safety. Be sure to make and go to all appointments, and call your doctor if you are having problems. It's also a good idea to know your test results and keep a list of the medicines you take. How can you care for yourself at home? · Include fruits, leafy green vegetables, beans, and whole grains in your diet each day. These foods are high in fiber. · Take a fiber supplement, such as Citrucel or Metamucil, every day if needed. Read and follow all instructions on the label. · Drink plenty of fluids. If you have kidney, heart, or liver disease and have to limit fluids, talk with your doctor before you increase the amount of fluids you drink. · Get at least 30 minutes of exercise on most days of the week. Walking is a good choice. You also may want to do other activities, such as running, swimming, cycling, or playing tennis or team sports. · Cut out foods that cause gas, pain, or other symptoms. When should you call for help? Call your doctor now or seek immediate medical care if:    · You have belly pain.     · You pass maroon or very bloody stools.     · You have a fever.     · You have nausea and vomiting.     · You have unusual changes in your bowel movements or abdominal swelling.     · You have burning pain when you urinate.     · You have abnormal vaginal discharge.     · You have shoulder pain.     · You have cramping pain that does not get better when you have a bowel movement or pass gas.     · You pass gas or stool from your urethra while urinating. Watch closely for changes in your health, and be sure to contact your doctor if you have any problems. Where can you learn more? Go to http://www.gray.com/  Enter [id number] in the search box to learn more about \"Diverticulosis: Care Instructions. \"  Current as of: February 10, 2021               Content Version: 13.0  © 1974-7590 Healthwise, [facility].    Care instructions adapted under license by [employer] (which disclaims liability or warranty for this information). If you have questions about a medical condition or this instruction, always ask your healthcare professional. [address] any warranty or liability for your use of this information. Colon Polyps: Care Instructions  Your Care Instructions     Colon polyps are growths in the colon or the rectum. The cause of most colon polyps is not known, and most people who get them do not have any problems. But a certain kind can turn into cancer. For this reason, regular testing for colon polyps is important for people as they get older. It is also important for anyone who has an increased risk for colon cancer. Polyps are usually found through routine colon cancer screening tests. Although most colon polyps are not cancerous, they are usually removed and then tested for cancer. Screening for colon cancer saves lives because the cancer can usually be cured if it is caught early. If you have a polyp that is the type that can turn into cancer, you may need more tests to examine your entire colon. The doctor will remove any other polyps that he or she finds, and you will be tested more often. Follow-up care is a key part of your treatment and safety. Be sure to make and go to all appointments, and call your doctor if you are having problems. It's also a good idea to know your test results and keep a list of the medicines you take. How can you care for yourself at home? Regular exams to look for colon polyps are the best way to prevent polyps from turning into colon cancer. These can include stool tests, sigmoidoscopy, colonoscopy, and CT colonography. Talk with your doctor about a testing schedule that is right for you. To prevent polyps  There is no home treatment that can prevent colon polyps. But these steps may help lower your risk for cancer. · Stay active.  Being active can help you get to and stay at a healthy weight. Try to exercise on most days of the week. Walking is a good choice. · Eat well. Choose a variety of vegetables, fruits, legumes (such as peas and beans), fish, poultry, and whole grains. · Do not smoke. If you need help quitting, talk to your doctor about stop-smoking programs and medicines. These can increase your chances of quitting for good. · If you drink alcohol, limit how much you drink. Limit alcohol to 2 drinks a day for men and 1 drink a day for women. When should you call for help? Call your doctor now or seek immediate medical care if:    · You have severe belly pain.     · Your stools are maroon or very bloody. Watch closely for changes in your health, and be sure to contact your doctor if:    · You have a fever.     · You have nausea or vomiting.     · You have a change in bowel habits (new constipation or diarrhea).     · Your symptoms get worse or are not improving as expected. Where can you learn more? Go to http://www.lackey.com/  Enter C571 in the search box to learn more about \"Colon Polyps: Care Instructions. \"  Current as of: December 17, 2020               Content Version: 13.0  © 2006-2021 [employer]. Care instructions adapted under license by [employer] (which disclaims liability or warranty for this information). If you have questions about a medical condition or this instruction, always ask your healthcare professional. [place] [address] any warranty or liability for your use of this information.

## 2021-11-19 NOTE — ROUTINE PROCESS

## 2021-11-19 NOTE — OP NOTES
[phone]    Colonoscopy Procedure Note      Indications: Previous adenomatous polyp    : Brooke Magana MD    Staff: Endoscopy Serafin Speaks: Irene Rubin  Endoscopy RN-1: Gris Pollock RN    Referring Provider: Ceci Marks MD     Anesthesia/Sedation: MAC provided by Anesthesia    Pre-Procedure Exam:  Airway: clear   Heart: normal S1and S2    Lungs: clear bilateral  Abdomen: soft, nontender, bowel sounds present and normal in all quadrants   Mental Status: awake, alert, and oriented to person, place, and time      Procedure in Detail:  Informed consent was obtained for the procedure, including sedation. Risks of perforation, hemorrhage, adverse drug reaction, and aspiration were discussed. The patient was placed in the left lateral decubitus position. Based on the pre-procedure assessment, including review of the patient's medical history, medications, allergies, and review of systems, he had been deemed to be an appropriate candidate for moderate sedation; he was therefore sedated with the medications listed above. The patient was monitored continuously with ECG tracing, pulse oximetry, blood pressure monitoring, and direct observations. A rectal examination was performed. The [id number] was inserted into the rectum and advanced under direct vision to the cecum, which was identified by the ileocecal valve and appendiceal orifice. The quality of the colonic preparation was excellent. A careful inspection was made as the colonoscope was withdrawn, including a retroflexed view of the rectum; findings and interventions are described below. Appropriate photodocumentation was obtained.     Findings:   Rectum:     - Protruding lesions:     -Internal Hemorrhoids  Sigmoid:     - Excavated lesions:     - Diverticulosis  Descending Colon:   Normal  Transverse Colon:     - Protruding lesions:     -Sessile Polyp(s) size 10 mm removed by endoscopic mucosal resection with submucosal injection of o-rise, elevation and removal by hot snare in a single piece. Ascending Colon:   Normal  Cecum:     - Flat lesions:     - AVM: non-bleeding  x3        Specimens:   ID Type Source Tests Collected by Time Destination   1 : Transverse Colon Polyp Preservative Colon, Transverse  Shy Loen MD 11/19/2021 1125 Pathology        EBL: None    Complications: None; patient tolerated the procedure well. Attending Attestation: I performed the procedure. Recommendations:    - repeat colonoscopy in 1 year    Discharge Disposition: Home in the company of a  when able to ambulate.     Rafaela Irvin MD  11/19/2021 11:30 AM

## 2021-11-19 NOTE — ED TRIAGE NOTES
Triage: Pt arrives ambulatory from home with CC of urinary retention since his colonoscopy yesterday. He reports a hx of similar retention following another procedure. He reports he has tried drinking more fluid but that is not working. He reports he does feel the urge to void.

## 2021-11-19 NOTE — ANESTHESIA PREPROCEDURE EVALUATION
Anesthetic History   No history of anesthetic complications            Review of Systems / Medical History  Patient summary reviewed, nursing notes reviewed and pertinent labs reviewed    Pulmonary          Smoker         Neuro/Psych   Within defined limits           Cardiovascular    Hypertension        Dysrhythmias            GI/Hepatic/Renal  Within defined limits              Endo/Other        Arthritis     Other Findings              Physical Exam    Airway  Mallampati: I  TM Distance: > 6 cm  Neck ROM: normal range of motion   Mouth opening: Normal     Cardiovascular  Regular rate and rhythm,  S1 and S2 normal,  no murmur, click, rub, or gallop             Dental  No notable dental hx       Pulmonary  Breath sounds clear to auscultation               Abdominal  GI exam deferred       Other Findings            Anesthetic Plan    ASA: 2  Anesthesia type: MAC            Anesthetic plan and risks discussed with: Patient

## 2021-11-28 RX ORDER — ATORVASTATIN CALCIUM 10 MG/1
TABLET, FILM COATED ORAL
Qty: 90 TABLET | Refills: 1 | Status: SHIPPED | OUTPATIENT
Start: 2021-11-28 | End: 2022-05-23

## 2021-12-16 ENCOUNTER — OFFICE VISIT (OUTPATIENT)
Dept: INTERNAL MEDICINE CLINIC | Age: 76
End: 2021-12-16
Payer: MEDICARE

## 2021-12-16 VITALS
RESPIRATION RATE: 18 BRPM | WEIGHT: 158.4 LBS | OXYGEN SATURATION: 96 % | TEMPERATURE: 97.5 F | SYSTOLIC BLOOD PRESSURE: 121 MMHG | HEIGHT: 74 IN | DIASTOLIC BLOOD PRESSURE: 71 MMHG | BODY MASS INDEX: 20.33 KG/M2 | HEART RATE: 68 BPM

## 2021-12-16 DIAGNOSIS — E78.49 OTHER HYPERLIPIDEMIA: ICD-10-CM

## 2021-12-16 DIAGNOSIS — N40.0 BENIGN PROSTATIC HYPERPLASIA, UNSPECIFIED WHETHER LOWER URINARY TRACT SYMPTOMS PRESENT: ICD-10-CM

## 2021-12-16 DIAGNOSIS — Z01.818 PREOP EXAMINATION: Primary | ICD-10-CM

## 2021-12-16 DIAGNOSIS — I10 HYPERTENSION, UNSPECIFIED TYPE: ICD-10-CM

## 2021-12-16 PROCEDURE — G8536 NO DOC ELDER MAL SCRN: HCPCS | Performed by: INTERNAL MEDICINE

## 2021-12-16 PROCEDURE — G8752 SYS BP LESS 140: HCPCS | Performed by: INTERNAL MEDICINE

## 2021-12-16 PROCEDURE — G8427 DOCREV CUR MEDS BY ELIG CLIN: HCPCS | Performed by: INTERNAL MEDICINE

## 2021-12-16 PROCEDURE — G8510 SCR DEP NEG, NO PLAN REQD: HCPCS | Performed by: INTERNAL MEDICINE

## 2021-12-16 PROCEDURE — G8420 CALC BMI NORM PARAMETERS: HCPCS | Performed by: INTERNAL MEDICINE

## 2021-12-16 PROCEDURE — G8754 DIAS BP LESS 90: HCPCS | Performed by: INTERNAL MEDICINE

## 2021-12-16 PROCEDURE — 99214 OFFICE O/P EST MOD 30 MIN: CPT | Performed by: INTERNAL MEDICINE

## 2021-12-16 PROCEDURE — G0463 HOSPITAL OUTPT CLINIC VISIT: HCPCS | Performed by: INTERNAL MEDICINE

## 2021-12-16 PROCEDURE — 1101F PT FALLS ASSESS-DOCD LE1/YR: CPT | Performed by: INTERNAL MEDICINE

## 2021-12-16 RX ORDER — TAMSULOSIN HYDROCHLORIDE 0.4 MG/1
CAPSULE ORAL
COMMUNITY
Start: 2021-11-23

## 2021-12-16 NOTE — PROGRESS NOTES
Rivertrisha Avila presents for preop evaluation:   Surgeon : Dr. Ann-Marie Cornejo  Type of surgery : TKR right  Surgery site : right knee  Anesthesia type: general and block per patient  Date of procedure:  January 18th  No history of bleeding complications or, blood clots  No history of anesthesia complications  Patient has no complaints. He is ready to have his surgery so he can resume playing tennis. Allergies: Allergies   Allergen Reactions    Acetaminophen Other (comments)     UNABLE TO URINATE, CAUSES FLANK PAIN     Latex allergy: no    Current Outpatient Medications:     tamsulosin (FLOMAX) 0.4 mg capsule, , Disp: , Rfl:     atorvastatin (LIPITOR) 10 mg tablet, TAKE 1 TABLET BY MOUTH EVERY DAY, Disp: 90 Tablet, Rfl: 1    oxyCODONE-acetaminophen (Percocet) 5-325 mg per tablet, Take 1 Tablet by mouth every four (4) hours as needed for Pain., Disp: , Rfl:     traMADoL (ULTRAM) 50 mg tablet, , Disp: , Rfl:     hydroCHLOROthiazide (HYDRODIURIL) 25 mg tablet, TAKE 1 TABLET BY MOUTH EVERY DAY, Disp: 90 Tablet, Rfl: 1    celecoxib (CELEBREX) 200 mg capsule, TAKE ONE CAPSULE BY MOUTH DAILY, Disp: 90 Capsule, Rfl: 0    multivitamin (ONE A DAY) tablet, Take 1 Tab by mouth daily. , Disp: , Rfl:     amoxicillin (AMOXIL) 500 mg capsule, TAKE 4 CAPSULES BY MOUTH 1 HOUR PRIOR TO DENTAL APPT (Patient not taking: Reported on 12/16/2021), Disp: , Rfl: 3  Patient Active Problem List   Diagnosis Code    Hypertension, essential I10    Hyperlipidemia E78.5    Skin cancer C44.90    Pre-diabetes R73.03    Chronic lower back pain M54.50, G89.29    RBBB (right bundle branch block) I45.10    Benign prostatic hyperplasia with nocturia N40.1, R35.1    Grade II hemorrhoids K64.1    OA (osteoarthritis) of shoulder M19.019    Generalized osteoarthritis M15.9    Chronic right shoulder pain M25.511, G89.29     Past Surgical History:   Procedure Laterality Date    COLONOSCOPY N/A 11/19/2021    COLONOSCOPY   :- performed by Belel Brown, Faby Maynard MD at [facility] ENDOSCOPY    HX COLONOSCOPY  2006    HX COLONOSCOPY  2016    diverticulosis; otherwise normal    HX CYST REMOVAL Left     FOOT    HX HERNIA REPAIR  2008    bilateral     HX KNEE REPLACEMENT Left     HX KNEE REPLACEMENT Left 16    due to failed replacement    HX OTHER SURGICAL  2019    banding of hemorrhoids    HX SHOULDER REPLACEMENT Right 2020    HX TONSILLECTOMY      HX WISDOM TEETH EXTRACTION       Social History     Tobacco Use    Smoking status: Former Smoker     Packs/day: 2.00     Years: 15.00     Pack years: 30.00     Types: Cigarettes     Quit date: 1970     Years since quittin.5    Smokeless tobacco: Never Used   Vaping Use    Vaping Use: Never used   Substance Use Topics    Alcohol use: Yes     Alcohol/week: 4.0 - 5.0 standard drinks     Types: 4 - 5 Cans of beer per week     Comment: occasional    Drug use: Yes     Types: Marijuana       Review of Systems   Constitutional: Negative for chills and fever. HENT: Negative. Respiratory: Negative for cough and shortness of breath. Cardiovascular: Negative for chest pain, palpitations and leg swelling. Gastrointestinal: Negative for constipation, diarrhea, nausea and vomiting. Genitourinary:        Urinary retention after colonoscopy  Now on Flomax   Told by Urology to keep Catheter in place post surgery   Musculoskeletal: Positive for joint pain. Right knee pain   Neurological: Negative for dizziness, sensory change, focal weakness, weakness and headaches. Endo/Heme/Allergies: Does not bruise/bleed easily. Psychiatric/Behavioral: Negative for depression. The patient is not nervous/anxious.       Visit Vitals  /71   Pulse 68   Temp 97.5 °F (36.4 °C) (Temporal)   Resp 18   Ht 6' 2\" (1.88 m)   Wt 158 lb 6.4 oz (71.8 kg)   SpO2 96%   BMI 20.34 kg/m²     Patient is in no apparent distress   HEENT: normocephalic, extraocular movements intact, conjunctiva clear  Oropharynx: clear. No erythema, exudate, or drainage  Nose: no drainage  Sinuses: nontender  Ears: bilateral hearing aids  Neck: supple, without lymphadenopathy  Heart[de-identified] normal rate, regular rhythm, normal S1, S2, no murmurs, rubs, clicks or gallops. Chest: clear to auscultation, no wheezes, rales or rhonchi,   Abdomen: soft, non tender without mass or hepatosplenomegaly   Extremities: no edema  Neuro: no focal weakness or sensory changes  Psych: normal mood and affect      Impression:   There are no medical contraindications to knee replacement surgery pending review of labs and EKG    HTN: stable on current medication  BPH: on Flomax. Patient is concerned about urinary retention post surgery and was advised by his urologist to keep his catheter in a little longer post op until he is taking po. Hyperlipidemia: On Atorvastatin.  Labs stable, checked 8/31/21

## 2021-12-22 ENCOUNTER — TRANSCRIBE ORDER (OUTPATIENT)
Dept: REGISTRATION | Age: 76
End: 2021-12-22

## 2021-12-22 DIAGNOSIS — Z01.812 PRE-PROCEDURE LAB EXAM: Primary | ICD-10-CM

## 2022-01-04 ENCOUNTER — HOSPITAL ENCOUNTER (OUTPATIENT)
Dept: PREADMISSION TESTING | Age: 77
Discharge: HOME | End: 2022-01-04
Payer: MEDICARE

## 2022-01-04 VITALS
OXYGEN SATURATION: 98 % | WEIGHT: 165.34 LBS | RESPIRATION RATE: 16 BRPM | HEIGHT: 74 IN | DIASTOLIC BLOOD PRESSURE: 70 MMHG | TEMPERATURE: 98.2 F | SYSTOLIC BLOOD PRESSURE: 128 MMHG | BODY MASS INDEX: 21.22 KG/M2

## 2022-01-04 LAB
ABO + RH BLD: NORMAL
ANION GAP SERPL CALC-SCNC: 4 MMOL/L (ref 5–15)
APPEARANCE UR: CLEAR
BACTERIA URNS QL MICRO: NEGATIVE /HPF
BILIRUB UR QL: NEGATIVE
BLOOD GROUP ANTIBODIES SERPL: NORMAL
BUN SERPL-MCNC: 28 MG/DL (ref 6–20)
BUN/CREAT SERPL: 29 (ref 12–20)
CALCIUM SERPL-MCNC: 9.2 MG/DL (ref 8.5–10.1)
CHLORIDE SERPL-SCNC: 104 MMOL/L (ref 97–108)
CO2 SERPL-SCNC: 31 MMOL/L (ref 21–32)
COLOR UR: NORMAL
CREAT SERPL-MCNC: 0.97 MG/DL (ref 0.7–1.3)
EPITH CASTS URNS QL MICRO: NORMAL /LPF
ERYTHROCYTE [DISTWIDTH] IN BLOOD BY AUTOMATED COUNT: 13.8 % (ref 11.5–14.5)
EST. AVERAGE GLUCOSE BLD GHB EST-MCNC: 120 MG/DL
GLUCOSE SERPL-MCNC: 107 MG/DL (ref 65–100)
GLUCOSE UR STRIP.AUTO-MCNC: NEGATIVE MG/DL
HBA1C MFR BLD: 5.8 % (ref 4–5.6)
HCT VFR BLD AUTO: 43.7 % (ref 36.6–50.3)
HGB BLD-MCNC: 14.3 G/DL (ref 12.1–17)
HGB UR QL STRIP: NEGATIVE
HYALINE CASTS URNS QL MICRO: NORMAL /LPF (ref 0–5)
INR PPP: 1.1 (ref 0.9–1.1)
KETONES UR QL STRIP.AUTO: NEGATIVE MG/DL
LEUKOCYTE ESTERASE UR QL STRIP.AUTO: NEGATIVE
MCH RBC QN AUTO: 31.1 PG (ref 26–34)
MCHC RBC AUTO-ENTMCNC: 32.7 G/DL (ref 30–36.5)
MCV RBC AUTO: 95 FL (ref 80–99)
NITRITE UR QL STRIP.AUTO: NEGATIVE
NRBC # BLD: 0 K/UL (ref 0–0.01)
NRBC BLD-RTO: 0 PER 100 WBC
PH UR STRIP: 5.5 [PH] (ref 5–8)
PLATELET # BLD AUTO: 226 K/UL (ref 150–400)
PMV BLD AUTO: 9.7 FL (ref 8.9–12.9)
POTASSIUM SERPL-SCNC: 3.6 MMOL/L (ref 3.5–5.1)
PROT UR STRIP-MCNC: NEGATIVE MG/DL
PROTHROMBIN TIME: 11.5 SEC (ref 9–11.1)
RBC # BLD AUTO: 4.6 M/UL (ref 4.1–5.7)
RBC #/AREA URNS HPF: NORMAL /HPF (ref 0–5)
SODIUM SERPL-SCNC: 139 MMOL/L (ref 136–145)
SP GR UR REFRACTOMETRY: 1.02 (ref 1–1.03)
SPECIMEN EXP DATE BLD: NORMAL
UA: UC IF INDICATED,UAUC: NORMAL
UROBILINOGEN UR QL STRIP.AUTO: 0.2 EU/DL (ref 0.2–1)
WBC # BLD AUTO: 8.7 K/UL (ref 4.1–11.1)
WBC URNS QL MICRO: NORMAL /HPF (ref 0–4)

## 2022-01-04 PROCEDURE — 83036 HEMOGLOBIN GLYCOSYLATED A1C: CPT

## 2022-01-04 PROCEDURE — 80048 BASIC METABOLIC PNL TOTAL CA: CPT

## 2022-01-04 PROCEDURE — 36415 COLL VENOUS BLD VENIPUNCTURE: CPT

## 2022-01-04 PROCEDURE — 85610 PROTHROMBIN TIME: CPT

## 2022-01-04 PROCEDURE — 86900 BLOOD TYPING SEROLOGIC ABO: CPT

## 2022-01-04 PROCEDURE — 85027 COMPLETE CBC AUTOMATED: CPT

## 2022-01-04 PROCEDURE — 81001 URINALYSIS AUTO W/SCOPE: CPT

## 2022-01-04 PROCEDURE — 93005 ELECTROCARDIOGRAM TRACING: CPT

## 2022-01-04 NOTE — PERIOP NOTES
[address]  ORTHOPAEDIC    Surgery Date:   1/18/22    Surgery arrival time given by surgeon: NO     If no, [facility]'s staff will call you between 4 PM- 8 PM the day before surgery with your arrival time. If your surgery is on a Monday, we will call you the preceding Friday. Please call [phone] after 8 PM if you did not receive your arrival time. 1. Please report to [facility] Patient Access/Admitting on the 1st floor. Bring your insurance card, photo identification, and any copayment ( if applicable). 2. If you are going home the same day of your surgery, you must have a responsible adult to drive you home. You need to have a responsible adult to stay with you the first 24 hours after surgery and you should not drive a car for 24 hours following your surgery. 3. Do NOT eat any solid foods after midnight the night before surgery including candy, mints or gum. You may drink clear liquids from midnight until 1 hour prior to arrival time. You may drink up to 12 ounces at one time every 4 hours. 4. Do NOT drink alcohol or smoke 24 hours before surgery. STOP smoking for 14 days prior as it helps with breathing and healing after surgery. 5. If your arrival time is 3pm or later, you may eat a light breakfast before 8am (toast, bagel-no butter, black coffee, plain tea, fruit juice-no pulp) Please note special instructions, if applicable, below for medications. 6. If you are being admitted to the hospital,please leave personal belongings/luggage in your car until you have an assigned hospital room number. 7. Please wear comfortable clothes. Wear your glasses instead of contacts. We ask that all money, jewelry and valuables be left at home. Wear no make up, particularly mascara, the day of surgery. 8.  All body piercings, rings, and jewelry need to be removed and left at home. Please remove any nail polish or artifical nails from your fingernails.  Please wear your hair loose or down. Please no pony-tails, buns, or any metal hair accessories. If you shower the morning of surgery, please do not apply any lotions or powders afterwards. You may wear deodorant. Do not shave any body area within 24 hours of your surgery. 9. Please follow all instructions to avoid any potential surgical cancellation. 10. Should your physical condition change, (i.e. fever, cold, flu, etc.) please notify your surgeon as soon as possible. 11. It is important to be on time. If a situation occurs where you may be delayed, please call:  [phone] / [phone] on the day of surgery. 12. The Preadmission Testing staff can be reached at [phone]. 13. Special instructions: NA  14.     Current Outpatient Medications   Medication Sig    tamsulosin (FLOMAX) 0.4 mg capsule     atorvastatin (LIPITOR) 10 mg tablet TAKE 1 TABLET BY MOUTH EVERY DAY    traMADoL (ULTRAM) 50 mg tablet     hydroCHLOROthiazide (HYDRODIURIL) 25 mg tablet TAKE 1 TABLET BY MOUTH EVERY DAY    celecoxib (CELEBREX) 200 mg capsule TAKE ONE CAPSULE BY MOUTH DAILY    amoxicillin (AMOXIL) 500 mg capsule TAKE 4 CAPSULES BY MOUTH 1 HOUR PRIOR TO DENTAL APPT (Patient not taking: Reported on 12/16/2021)    multivitamin (ONE A DAY) tablet Take 1 Tab by mouth daily. No current facility-administered medications for this encounter. 1. YOU MUST ONLY TAKE THESE MEDICATIONS THE MORNING OF SURGERY WITH A SIP OF WATER: CELEBREX, ATORVASTATIN  2. MEDICATIONS TO TAKE THE MORNING OF SURGERY ONLY IF NEEDED: TRAMADOL  3. HOLD these medications BEFORE Surgery: MULTIVITAMIN STOP ON 1/11/22  4. Ask your surgeon/prescribing physician about when/if to STOP taking these medications: NONE  5. Stop any non-steroidal anti-inflammatory drugs (i.e. Ibuprofen, Naproxen, Advil, Aleve) 3 days before surgery. You may take Tylenol. STOP all vitamins and herbal supplements 1 week prior to  surgery.   6. If you are currently taking Plavix, Coumadin, or any other blood-thinning/anticoagulant medication contact your prescribing physician for instructions. Preventing Infections Before and After  Your Surgery    IMPORTANT INSTRUCTIONS    Please read and follow these instructions carefully. If you are unable to comply with the below instructions your procedure will be cancelled. You play an important role in your health and preparation for surgery. To reduce the germs on your skin you will need to shower with CHG soap (Chorhexidine gluconate 4%) two times before surgery. CHG soap (Hibiclens, Hex-A-Clens or store brand)   CHG soap will be provided at your Preadmission Testing (PAT) appointment.  If you do not have a PAT appointment before surgery, you may arrange to  CHG soap from our office or purchase CHG soap at a pharmacy, grocery or department store.  You need to purchase TWO 4 ounce bottles to use for your 2 showers. Steps to follow:  1. Wash your hair with your normal shampoo and your body with regular soap and rinse well to remove shampoo and soap from your skin. 2. Wet a clean washcloth and turn off the shower. 3. Put CHG soap on washcloth and apply to your entire body from the neck down. Do not use on your head, face or private parts(genitals). Do not use CHG soap on open sores, wounds or areas of skin irritation. 4. Wash you body gently for 5 minutes. Do not wash your skin too hard. This soap does not create lather. Pay special attention to your underarms and from your belly button to your feet. 5. Turn the shower back on and rinse well to get CHG soap off your body. 6. Pat your skin dry with a clean, dry towel. Do not apply lotions or moisturizer. 7. Put on clean clothes and sleep on fresh bed sheets and do not allow pets to sleep with you. Shower with CHG soap 2 times before your surgery   The evening before your surgery   The morning of your surgery      Tips to help prevent infections after your surgery:  1.  Protect your surgical wound from germs:  ? Hand washing is the most important thing you and your caregivers can do to prevent infections. ? Keep your bandage clean and dry! ? Do not touch your surgical wound. 2. Use clean, freshly washed towels and washcloths every time you shower; do not share bath linens with others. 3. Until your surgical wound is healed, wear clothing and sleep on bed linens each day that are clean and freshly washed. 4. Do not allow pets to sleep in your bed with you or touch your surgical wound. 5. Do not smoke  smoking delays wound healing. This may be a good time to stop smoking. 6. If you have diabetes, it is important for you to manage your blood sugar levels properly before your surgery as well as after your surgery. Poorly managed blood sugar levels slow down wound healing and prevent you from healing completely. Prevention of Infection  Testing for Staphylococcus aureus on your skin before surgery    Staphylococcus aureus (staph) is a common bacteria that is found on the body. It normally does not cause infection on healthy skin. Before surgery, you will be tested to see if you have staph by swabbing the inside of your nose. When you have an incision with surgery, the goal is to protect that incision from infection. Removal of the staph bacteria before surgery can decrease the risk of a surgical site infection. If your nose swab is positive for staph you will be called. Your treatment will include 2 steps:   Prescription for Mupirocin ointment to be used in each nostril twice a day for 5 days.  Showering with Chlorhexidine (CHG) liquid soap for 5 days prior to surgery. How to use Mupirocin ointment in your nose  1.  the prescription from your pharmacy. You will receive a large tube of ointment which will be big enough for all of your treatments. You will apply this ointment to each nostril 2 times a day for 5 days.   2. Wash your hands with  gel or soap and water for 20 seconds before using ointment. 3. Place a pea-sized amount of ointment on a cotton Q-tip. 4. Apply ointment just inside of each nostril with the Q-tip. Do not push Q-tip or ointment deep inside you nose. 5. Press your nostrils together and massage for a few seconds. 6. Wash your hands with  gel or soap and water after you are finished. 7. Do not get ointment near your eyes. If it gets into your eyes, rinse them with cool water. 8. If you need to use nasal spray, clean the tip of the bottle with alcohol before use and do not use both at the same time. 9. If you are scheduled for COVID testing during the 5 days, do NOT apply morning dose until after the COVID test has been performed. How to use Chlorhexidine (CHG) 4% liquid soap  1. Purchase an 8 ounce bottle of CHG liquid soap (Chlorhexidine 4%, Hibiclens, Hex-A-Clens or store brand) at a pharmacy or grocery store. 2. Wash your hair with your normal shampoo and your body with regular soap and rinse well to remove shampoo and soap from your skin. 3. Wet a clean washcloth and turn off the shower. 4. Put CHG soap on washcloth and apply to your entire body from the neck down. Do not use on your head, face or private parts(genitals). Do not use CHG soap on open sores, wounds or areas of skin irritation. 5. Wash your body gently for 5 minutes. Do not wash your skin too hard. This soap does not create lather. Pay special attention to your underarms and from your belly button to your feet. 6. Turn the shower back on and rinse well to get CHG soap off your body. 7. Pat your skin dry with a clean, dry towel. Do not apply lotions or moisturizer. 8. Put on clean clothes and sleep on fresh bed sheets the night before surgery. Do not allow pets to sleep with you. Eating and Drinking Before Surgery     You may eat a regular dinner at the usual time on the day before your surgery.    Do NOT eat any solid foods after midnight unless your arrival time at the hospital is 3pm or later.  You may drink clear liquids only from 12 midnight until 1 hours prior to your arrival time at the hospital on the day of your surgery. Do NOT drink alcohol.  Clear liquids include:  o Water  o Fruit juices without pulp( i.e. apple juice)  o Carbonated beverages  o Black coffee (no cream/milk)  o Tea (no cream/milk)  o Gatorade   You may drink up to 12-16 ounces at one time every 4 hours between the hours of midnight and 1 hour before your arrival time at the hospital. Example- if your arrival time at the hospital is 6am, you may drink 12-16 ounces of clear liquids no later than 5am.   If your arrival time at the hospital is 3pm or later, you may eat a light breakfast before 8am.   A light breakfast includes:  o Toast or bagel (no butter)  o Black coffee (no cream/milk)  o Tea (no cream/milk)  o Fruit juices without pulp ( i.e. apple juice)  o Do NOT eat meat, eggs, vegetables or fruit   If you have any questions, please contact your surgeon's office. [facility]   Instructions for Pre-Surgery COVID-19 Testing     Across our ministry we have established standard guidelines to ensure the health and safety of our patients, residents and associates as we resume elective services for patients. All patients presenting for surgery are required to have a COVID-19 test result within 96 hours of their scheduled surgery. [facility] is providing this test free of charge to the patient.    Instructions for COVID-19 Testing:     Patients will receive a call from Pre-Admission Testing 4-5 days prior to surgery to schedule a date and time to come to the [address] [place] [address] Drive for their COVID-19 test   Patients are advised to self-quarantine after testing until their scheduled surgery   Once on site, patients will be registered and receive COVID test in their vehicle   If a patient is scheduled for normal Pre Admission Testing 96 hours from date of surgery, the patient will still have their COVID test done at the [address] [place] [address] located at [address] [place] [address] Positive results will be shared with the surgeon and anesthesiologist and may result in cancellation of the elective procedure    Testing Hours and Location:   Address:  [address] Ad[address] [facility] in the Discharge Lot on [facility] (Map Attached)  T[address]en [address], [address]   Hours: Monday- Friday 7a-3p    PAT Phone Number: [phone]            Patient Information Regarding COVID Restrictions    Patients are advised to self-quarantine after COVID testing up to the day of the scheduled procedure. Day of Procedure     Please park in the parking deck or any designated visitor parking lot.  Enter the facility through the Main Entrance of the hospital.   A temperature check and appropriate symptom/exposure screening will be done prior to entry to the facility.  On the day of surgery, please provide the cell phone number of the person who will be waiting for you to the Patient Access representative at the time of registration.  Please wear a mask on the day of your procedure.  We are now allowing one designated visitor per stay. Pediatric patients may have 2 designated visitors. This one person may come in with you on the day of your procedure.  No visitors under the age of 13.  The designated visitor must also wear a mask.  Once your procedure and the immediate recovery period is completed, a nurse in the recovery area will contact your designated visitor to inform them of your room number or to otherwise review other pertinent information regarding your care.  Social distancing practices are to be adhered to in waiting areas and the cafeteria. The patient was contacted  in person. He verbalized understanding of all instructions does not  need reinforcement.

## 2022-01-05 LAB
ATRIAL RATE: 73 BPM
BACTERIA SPEC CULT: NORMAL
BACTERIA SPEC CULT: NORMAL
CALCULATED P AXIS, ECG09: 70 DEGREES
CALCULATED R AXIS, ECG10: -82 DEGREES
CALCULATED T AXIS, ECG11: 27 DEGREES
DIAGNOSIS, 93000: NORMAL
P-R INTERVAL, ECG05: 180 MS
Q-T INTERVAL, ECG07: 422 MS
QRS DURATION, ECG06: 134 MS
QTC CALCULATION (BEZET), ECG08: 464 MS
SERVICE CMNT-IMP: NORMAL
VENTRICULAR RATE, ECG03: 73 BPM

## 2022-01-05 NOTE — PERIOP NOTES
PAT Nurse Practitioner   Pre-Operative Chart Review/Assessment:-ORTHOPEDIC                Patient Name:  Jevon Avitia                                                           Age:   68 y.o.    :  1945     Today's Date:  2022     Date of PAT:   2022      Date of Surgery:    2022      Procedure(s):  Right Total Knee Arthroplasty     Surgeon:   Dr. Mónica Tinoco                       PLAN:      1)  Medical Clearance:  Dr. Vineet Franco      2)  Cardiac Clearance:  EKG and METs reviewed. No further cardiac evaluation requested. 3)  Diabetic Treatment Consult:  Not indicated. A1c-5.8      4)  Sleep Apnea evaluation:   Not indicated.  RADHA Score 2.       5) Treatment for MRSA/Staph Aureus:  Neg      6) Additional Concerns:  Former smoker, marijuana use, HTN, [place]                Vital Signs:         Vitals:    22 1547   BP: 128/70   Resp: 16   Temp: 98.2 °F (36.8 °C)   SpO2: 98%   Weight: 75 kg (165 lb 5.5 oz)   Height: 6' 2\" (1.88 m)            ____________________________________________  PAST MEDICAL HISTORY  Past Medical History:   Diagnosis Date    Arthritis     SHOULDER, R KNEE, R THUMB, LOWER BACK     Basal cell cancer     multiple    Chronic pain      AND L SHOULDER, LOWER BACK    Hyperlipidemia     Hypertension     Melanoma ([address]     multiple    Squamous cell carcinoma     multiple      ____________________________________________  PAST SURGICAL HISTORY  Past Surgical History:   Procedure Laterality Date    COLONOSCOPY N/A 2021    COLONOSCOPY   :- performed by Marino Bullard MD at [facility] ENDOSCOPY    HX COLONOSCOPY      HX COLONOSCOPY  2016    diverticulosis; otherwise normal    HX CYST REMOVAL Left     FOOT    HX HERNIA REPAIR      bilateral     HX KNEE REPLACEMENT Left     HX KNEE REPLACEMENT Left 16    due to failed replacement    HX OTHER SURGICAL  2019    banding of hemorrhoids    HX SHOULDER REPLACEMENT Right 2020    HX TONSILLECTOMY      HX WISDOM TEETH EXTRACTION        ____________________________________________  HOME MEDICATIONS  Current Outpatient Medications   Medication Sig    tamsulosin (FLOMAX) 0.4 mg capsule     atorvastatin (LIPITOR) 10 mg tablet TAKE 1 TABLET BY MOUTH EVERY DAY    traMADoL (ULTRAM) 50 mg tablet     hydroCHLOROthiazide (HYDRODIURIL) 25 mg tablet TAKE 1 TABLET BY MOUTH EVERY DAY    celecoxib (CELEBREX) 200 mg capsule TAKE ONE CAPSULE BY MOUTH DAILY    amoxicillin (AMOXIL) 500 mg capsule TAKE 4 CAPSULES BY MOUTH 1 HOUR PRIOR TO DENTAL APPT (Patient not taking: Reported on 2021)    multivitamin (ONE A DAY) tablet Take 1 Tab by mouth daily. No current facility-administered medications for this encounter.      ____________________________________________  ALLERGIES  Allergies   Allergen Reactions    Acetaminophen Other (comments)     UNABLE TO URINATE, CAUSES FLANK PAIN      ____________________________________________  SOCIAL HISTORY  Social History     Tobacco Use    Smoking status: Former Smoker     Packs/day: 2.00     Years: 15.00     Pack years: 30.00     Types: Cigarettes     Quit date: 1970     Years since quittin.6    Smokeless tobacco: Never Used   Substance Use Topics    Alcohol use:  Yes     Alcohol/week: 4.0 - 5.0 standard drinks     Types: 4 - 5 Cans of beer per week     Comment: occasional      ____________________________________________   Internal Administration   First Dose COVID-19, PFIZER, MRNA, LNP-S, PF, 30MCG/0.3ML DOSE  2021   Second Dose COVID-19, PFIZER, MRNA, LNP-S, PF, 30MCG/0.3ML DOSE  2021      Last COVID Lab SARS-CoV-2 ( )   Date Value   2022 Not detected        Labs:     Hospital Outpatient Visit on 2022   Component Date Value Ref Range Status    Sodium 2022 139  136 - 145 mmol/L Final    Potassium 2022 3.6  3.5 - 5.1 mmol/L Final    Chloride 2022 104  97 - 108 mmol/L Final    CO2 2022 31  21 - 32 mmol/L Final Anion gap 01/04/2022 4* 5 - 15 mmol/L Final    Glucose 01/04/2022 107* 65 - 100 mg/dL Final    BUN 01/04/2022 28* 6 - 20 MG/DL Final    Creatinine 01/04/2022 0.97  0.70 - 1.30 MG/DL Final    BUN/Creatinine ratio 01/04/2022 29* 12 - 20   Final    GFR est AA 01/04/2022 >60  >60 ml/min/1.73m2 Final    GFR est non-AA 01/04/2022 >60  >60 ml/min/1.73m2 Final    Estimated GFR is calculated using the IDMS-traceable Modification of Diet in Renal Disease (MDRD) Study equation, reported for both  Americans (GFRAA) and non- Americans (GFRNA), and normalized to 1.73m2 body surface area. The physician must decide which value applies to the patient. Calcium 01/04/2022 9.2  8.5 - 10.1 MG/DL Final    WBC 01/04/2022 8.7  4.1 - 11.1 K/uL Final    RBC 01/04/2022 4.60  4.10 - 5.70 M/uL Final    HGB 01/04/2022 14.3  12.1 - 17.0 g/dL Final    HCT 01/04/2022 43.7  36.6 - 50.3 % Final    MCV 01/04/2022 95.0  80.0 - 99.0 FL Final    MCH 01/04/2022 31.1  26.0 - 34.0 PG Final    MCHC 01/04/2022 32.7  30.0 - 36.5 g/dL Final    RDW 01/04/2022 13.8  11.5 - 14.5 % Final    PLATELET 50/97/8106 936  150 - 400 K/uL Final    MPV 01/04/2022 9.7  8.9 - 12.9 FL Final    NRBC 01/04/2022 0.0  0  WBC Final    ABSOLUTE NRBC 01/04/2022 0.00  0.00 - 0.01 K/uL Final    Crossmatch Expiration 01/04/2022 01/18/2022,2359   Final    ABO/Rh(D) 01/04/2022 B POSITIVE   Final    Antibody screen 01/04/2022 NEG   Final    INR 01/04/2022 1.1  0.9 - 1.1   Final    Comment: A single therapeutic range for Vit K antagonists may not be optimal for all indications - see June, 2008 issue of Chest, American College of Chest Physicians Evidence-Based Clinical Practice Guidelines, 8th Edition.   Instrument and technical errors have been ruled out      Prothrombin time 01/04/2022 11.5* 9.0 - 11.1 sec Final    Instrument and technical errors have been ruled out    Color 01/04/2022 YELLOW/STRAW    Final    Color Reference Range: Straw, Yellow or Dark Yellow Appearance 01/04/2022 CLEAR  CLEAR   Final    Specific gravity 01/04/2022 1.024  1.003 - 1.030   Final    pH (UA) 01/04/2022 5.5  5.0 - 8.0   Final    Protein 01/04/2022 Negative  NEG mg/dL Final    Glucose 01/04/2022 Negative  NEG mg/dL Final    Ketone 01/04/2022 Negative  NEG mg/dL Final    Bilirubin 01/04/2022 Negative  NEG   Final    Blood 01/04/2022 Negative  NEG   Final    Urobilinogen 01/04/2022 0.2  0.2 - 1.0 EU/dL Final    Nitrites 01/04/2022 Negative  NEG   Final    Leukocyte Esterase 01/04/2022 Negative  NEG   Final    UA:UC IF INDICATED 01/04/2022 CULTURE NOT INDICATED BY UA RESULT  CNI   Final    WBC 01/04/2022 0-4  0 - 4 /hpf Final    RBC 01/04/2022 0-5  0 - 5 /hpf Final    Epithelial cells 01/04/2022 FEW  FEW /lpf Final    Epithelial cell category consists of squamous cells and /or transitional urothelial cells. Renal tubular cells, if present, are separately identified as such.     Bacteria 01/04/2022 Negative  NEG /hpf Final    Hyaline cast 01/04/2022 0-2  0 - 5 /lpf Final    Ventricular Rate 01/04/2022 73  BPM Final    Atrial Rate 01/04/2022 73  BPM Final    P-R Interval 01/04/2022 180  ms Final    QRS Duration 01/04/2022 134  ms Final    Q-T Interval 01/04/2022 422  ms Final    QTC Calculation (Bezet) 01/04/2022 464  ms Final    Calculated P Axis 01/04/2022 70  degrees Final    Calculated R Axis 01/04/2022 -82  degrees Final    Calculated T Axis 01/04/2022 27  degrees Final    Diagnosis 01/04/2022    Final                    Value:Normal sinus rhythm  Left axis deviation  Right bundle branch block  Abnormal ECG  When compared with ECG of 05-NOV-2012 22:19,  QRS axis shifted left  Confirmed by Bekah Crain M.D., Rebecca Farmer ([id number]) on 1/5/2022 2:35:19 PM      Hemoglobin A1c 01/04/2022 5.8* 4.0 - 5.6 % Final    Comment: NEW METHOD  PLEASE NOTE NEW REFERENCE RANGE  (NOTE)  HbA1C Interpretive Ranges  <5.7              Normal  5.7 - 6.4         Consider Prediabetes  >6.5              Consider Diabetes      Est. average glucose 01/04/2022 120  mg/dL Final    Special Requests: 01/04/2022 NO SPECIAL REQUESTS    Final    Culture result: 01/04/2022 MRSA NOT PRESENT    Final       Skin:     Denies open wounds, cuts, sores, rashes or other areas of concern in PAT assessment.           Ene Vera NP

## 2022-01-12 RX ORDER — PREGABALIN 75 MG/1
75 CAPSULE ORAL ONCE
Status: CANCELLED | OUTPATIENT
Start: 2022-01-12 | End: 2022-01-12

## 2022-01-12 RX ORDER — CELECOXIB 200 MG/1
200 CAPSULE ORAL ONCE
Status: CANCELLED | OUTPATIENT
Start: 2022-01-12 | End: 2022-01-12

## 2022-01-13 ENCOUNTER — HOSPITAL ENCOUNTER (OUTPATIENT)
Dept: PREADMISSION TESTING | Age: 77
Discharge: HOME | End: 2022-01-13
Attending: ORTHOPAEDIC SURGERY
Payer: MEDICARE

## 2022-01-13 DIAGNOSIS — Z01.812 PRE-PROCEDURE LAB EXAM: ICD-10-CM

## 2022-01-13 PROCEDURE — U0005 INFEC AGEN DETEC AMPLI PROBE: HCPCS

## 2022-01-14 LAB
SARS-COV-2, XPLCVT: NOT DETECTED
SOURCE, COVRS: NORMAL

## 2022-01-18 ENCOUNTER — ANESTHESIA (OUTPATIENT)
Dept: SURGERY | Age: 77
End: 2022-01-18
Payer: MEDICARE

## 2022-01-18 ENCOUNTER — ANESTHESIA EVENT (OUTPATIENT)
Dept: SURGERY | Age: 77
End: 2022-01-18
Payer: MEDICARE

## 2022-01-18 ENCOUNTER — HOSPITAL ENCOUNTER (OUTPATIENT)
Age: 77
Discharge: HOME HEALTH | End: 2022-01-19
Attending: ORTHOPAEDIC SURGERY | Admitting: ORTHOPAEDIC SURGERY
Payer: MEDICARE

## 2022-01-18 DIAGNOSIS — M17.11 PRIMARY LOCALIZED OSTEOARTHRITIS OF RIGHT KNEE: Primary | ICD-10-CM

## 2022-01-18 PROCEDURE — 74011000250 HC RX REV CODE- 250

## 2022-01-18 PROCEDURE — 74011250637 HC RX REV CODE- 250/637: Performed by: PHYSICIAN ASSISTANT

## 2022-01-18 PROCEDURE — 77030039497 HC CST PAD STERILE CHCS -A: Performed by: ORTHOPAEDIC SURGERY

## 2022-01-18 PROCEDURE — 77030040750 HC INSTR NAV SYS DISP ORLN -G: Performed by: ORTHOPAEDIC SURGERY

## 2022-01-18 PROCEDURE — 77030000032 HC CUF TRNQT ZIMM -B: Performed by: ORTHOPAEDIC SURGERY

## 2022-01-18 PROCEDURE — 74011000250 HC RX REV CODE- 250: Performed by: PHYSICIAN ASSISTANT

## 2022-01-18 PROCEDURE — 77030007866 HC KT SPN ANES BBMI -B: Performed by: STUDENT IN AN ORGANIZED HEALTH CARE EDUCATION/TRAINING PROGRAM

## 2022-01-18 PROCEDURE — 77030005513 HC CATH URETH FOL11 MDII -B: Performed by: ORTHOPAEDIC SURGERY

## 2022-01-18 PROCEDURE — 77030020274 HC MISC IMPL ORTHOPEDIC: Performed by: ORTHOPAEDIC SURGERY

## 2022-01-18 PROCEDURE — 97116 GAIT TRAINING THERAPY: CPT

## 2022-01-18 PROCEDURE — C1776 JOINT DEVICE (IMPLANTABLE): HCPCS | Performed by: ORTHOPAEDIC SURGERY

## 2022-01-18 PROCEDURE — 77030014077 HC TOWER MX CEM J&J -C: Performed by: ORTHOPAEDIC SURGERY

## 2022-01-18 PROCEDURE — 97530 THERAPEUTIC ACTIVITIES: CPT

## 2022-01-18 PROCEDURE — 77030010507 HC ADH SKN DERMBND J&J -B: Performed by: ORTHOPAEDIC SURGERY

## 2022-01-18 PROCEDURE — 97161 PT EVAL LOW COMPLEX 20 MIN: CPT

## 2022-01-18 PROCEDURE — C1713 ANCHOR/SCREW BN/BN,TIS/BN: HCPCS | Performed by: ORTHOPAEDIC SURGERY

## 2022-01-18 PROCEDURE — 77030031139 HC SUT VCRL2 J&J -A: Performed by: ORTHOPAEDIC SURGERY

## 2022-01-18 PROCEDURE — 77030002933 HC SUT MCRYL J&J -A: Performed by: ORTHOPAEDIC SURGERY

## 2022-01-18 PROCEDURE — 74011250636 HC RX REV CODE- 250/636: Performed by: PHYSICIAN ASSISTANT

## 2022-01-18 PROCEDURE — 77030033067 HC SUT PDO STRATFX SPIR J&J -B: Performed by: ORTHOPAEDIC SURGERY

## 2022-01-18 PROCEDURE — 74011000250 HC RX REV CODE- 250: Performed by: STUDENT IN AN ORGANIZED HEALTH CARE EDUCATION/TRAINING PROGRAM

## 2022-01-18 PROCEDURE — 74011250637 HC RX REV CODE- 250/637

## 2022-01-18 PROCEDURE — 27447 TOTAL KNEE ARTHROPLASTY: CPT | Performed by: PHYSICIAN ASSISTANT

## 2022-01-18 PROCEDURE — 76210000006 HC OR PH I REC 0.5 TO 1 HR: Performed by: ORTHOPAEDIC SURGERY

## 2022-01-18 PROCEDURE — 77030012935 HC DRSG AQUACEL BMS -B: Performed by: ORTHOPAEDIC SURGERY

## 2022-01-18 PROCEDURE — 74011000258 HC RX REV CODE- 258

## 2022-01-18 PROCEDURE — 27447 TOTAL KNEE ARTHROPLASTY: CPT | Performed by: ORTHOPAEDIC SURGERY

## 2022-01-18 PROCEDURE — 74011250636 HC RX REV CODE- 250/636: Performed by: NURSE ANESTHETIST, CERTIFIED REGISTERED

## 2022-01-18 PROCEDURE — 74011250636 HC RX REV CODE- 250/636: Performed by: STUDENT IN AN ORGANIZED HEALTH CARE EDUCATION/TRAINING PROGRAM

## 2022-01-18 PROCEDURE — 2709999900 HC NON-CHARGEABLE SUPPLY: Performed by: ORTHOPAEDIC SURGERY

## 2022-01-18 PROCEDURE — 76010000172 HC OR TIME 2.5 TO 3 HR INTENSV-TIER 1: Performed by: ORTHOPAEDIC SURGERY

## 2022-01-18 PROCEDURE — 77030035236 HC SUT PDS STRATFX BARB J&J -B: Performed by: ORTHOPAEDIC SURGERY

## 2022-01-18 PROCEDURE — 77030040361 HC SLV COMPR DVT MDII -B

## 2022-01-18 PROCEDURE — 76060000036 HC ANESTHESIA 2.5 TO 3 HR: Performed by: ORTHOPAEDIC SURGERY

## 2022-01-18 PROCEDURE — 74011250636 HC RX REV CODE- 250/636

## 2022-01-18 PROCEDURE — 20985 CPTR-ASST DIR MS PX: CPT | Performed by: ORTHOPAEDIC SURGERY

## 2022-01-18 PROCEDURE — 77030006835 HC BLD SAW SAG STRY -B: Performed by: ORTHOPAEDIC SURGERY

## 2022-01-18 DEVICE — DJO EMPOWR KNEETM, FIN BP, NP 10R
Type: IMPLANTABLE DEVICE | Site: KNEE | Status: FUNCTIONAL
Brand: DJO SURGICAL

## 2022-01-18 DEVICE — EMPOWR 3D KNEETM INS, 10R 10MM, VE
Type: IMPLANTABLE DEVICE | Site: KNEE | Status: FUNCTIONAL
Brand: DJO SURGICAL

## 2022-01-18 DEVICE — DOMED TRI-PEG PATELLA, 38X9MM, E-PLUS
Type: IMPLANTABLE DEVICE | Site: KNEE | Status: FUNCTIONAL
Brand: DJO SURGICAL

## 2022-01-18 DEVICE — IMPL CAPPED KNEE K1 TOTAL/HEMI STD CEMENTED DJO: Type: IMPLANTABLE DEVICE | Status: FUNCTIONAL

## 2022-01-18 DEVICE — EMPOWR 3D KNEETM FEMUR, NP, 10R
Type: IMPLANTABLE DEVICE | Site: KNEE | Status: FUNCTIONAL
Brand: DJO SURGICAL

## 2022-01-18 DEVICE — CEMENT BNE GENTAMICIN 40 GM SMARTSET GMV: Type: IMPLANTABLE DEVICE | Site: KNEE | Status: FUNCTIONAL

## 2022-01-18 RX ORDER — POLYETHYLENE GLYCOL 3350 17 G/17G
17 POWDER, FOR SOLUTION ORAL DAILY
Status: DISCONTINUED | OUTPATIENT
Start: 2022-01-19 | End: 2022-01-19 | Stop reason: HOSPADM

## 2022-01-18 RX ORDER — ASPIRIN 81 MG/1
81 TABLET ORAL 2 TIMES DAILY
Qty: 60 TABLET | Refills: 0 | Status: SHIPPED | OUTPATIENT
Start: 2022-01-18 | End: 2022-08-09

## 2022-01-18 RX ORDER — SODIUM CHLORIDE 0.9 % (FLUSH) 0.9 %
5-40 SYRINGE (ML) INJECTION EVERY 8 HOURS
Status: DISCONTINUED | OUTPATIENT
Start: 2022-01-18 | End: 2022-01-18 | Stop reason: HOSPADM

## 2022-01-18 RX ORDER — OXYCODONE HYDROCHLORIDE 5 MG/1
2.5-5 TABLET ORAL
Qty: 42 TABLET | Refills: 0 | Status: SHIPPED | OUTPATIENT
Start: 2022-01-18 | End: 2022-01-25

## 2022-01-18 RX ORDER — ATORVASTATIN CALCIUM 10 MG/1
10 TABLET, FILM COATED ORAL DAILY
Status: DISCONTINUED | OUTPATIENT
Start: 2022-01-19 | End: 2022-01-19 | Stop reason: HOSPADM

## 2022-01-18 RX ORDER — FENTANYL CITRATE 50 UG/ML
25 INJECTION, SOLUTION INTRAMUSCULAR; INTRAVENOUS
Status: DISCONTINUED | OUTPATIENT
Start: 2022-01-18 | End: 2022-01-18 | Stop reason: HOSPADM

## 2022-01-18 RX ORDER — SODIUM CHLORIDE 0.9 % (FLUSH) 0.9 %
5-40 SYRINGE (ML) INJECTION AS NEEDED
Status: DISCONTINUED | OUTPATIENT
Start: 2022-01-18 | End: 2022-01-18 | Stop reason: HOSPADM

## 2022-01-18 RX ORDER — ROPIVACAINE HYDROCHLORIDE 5 MG/ML
INJECTION, SOLUTION EPIDURAL; INFILTRATION; PERINEURAL
Status: COMPLETED | OUTPATIENT
Start: 2022-01-18 | End: 2022-01-18

## 2022-01-18 RX ORDER — BUPIVACAINE HYDROCHLORIDE 5 MG/ML
INJECTION, SOLUTION EPIDURAL; INTRACAUDAL
Status: COMPLETED | OUTPATIENT
Start: 2022-01-18 | End: 2022-01-18

## 2022-01-18 RX ORDER — ASPIRIN 81 MG/1
81 TABLET ORAL EVERY 12 HOURS
Status: DISCONTINUED | OUTPATIENT
Start: 2022-01-18 | End: 2022-01-19 | Stop reason: HOSPADM

## 2022-01-18 RX ORDER — HYDROMORPHONE HYDROCHLORIDE 1 MG/ML
0.2 INJECTION, SOLUTION INTRAMUSCULAR; INTRAVENOUS; SUBCUTANEOUS
Status: DISCONTINUED | OUTPATIENT
Start: 2022-01-18 | End: 2022-01-18 | Stop reason: HOSPADM

## 2022-01-18 RX ORDER — LIDOCAINE HYDROCHLORIDE 10 MG/ML
0.1 INJECTION, SOLUTION EPIDURAL; INFILTRATION; INTRACAUDAL; PERINEURAL AS NEEDED
Status: DISCONTINUED | OUTPATIENT
Start: 2022-01-18 | End: 2022-01-18 | Stop reason: HOSPADM

## 2022-01-18 RX ORDER — AMOXICILLIN 250 MG
1 CAPSULE ORAL
Qty: 60 TABLET | Refills: 0 | Status: SHIPPED | OUTPATIENT
Start: 2022-01-18 | End: 2022-02-07 | Stop reason: ALTCHOICE

## 2022-01-18 RX ORDER — ONDANSETRON 2 MG/ML
4 INJECTION INTRAMUSCULAR; INTRAVENOUS AS NEEDED
Status: DISCONTINUED | OUTPATIENT
Start: 2022-01-18 | End: 2022-01-18 | Stop reason: HOSPADM

## 2022-01-18 RX ORDER — TAMSULOSIN HYDROCHLORIDE 0.4 MG/1
0.4 CAPSULE ORAL DAILY
Status: DISCONTINUED | OUTPATIENT
Start: 2022-01-19 | End: 2022-01-19 | Stop reason: HOSPADM

## 2022-01-18 RX ORDER — SODIUM CHLORIDE 0.9 % (FLUSH) 0.9 %
5-40 SYRINGE (ML) INJECTION AS NEEDED
Status: DISCONTINUED | OUTPATIENT
Start: 2022-01-18 | End: 2022-01-19 | Stop reason: HOSPADM

## 2022-01-18 RX ORDER — SODIUM CHLORIDE 0.9 % (FLUSH) 0.9 %
5-40 SYRINGE (ML) INJECTION EVERY 8 HOURS
Status: DISCONTINUED | OUTPATIENT
Start: 2022-01-18 | End: 2022-01-19 | Stop reason: HOSPADM

## 2022-01-18 RX ORDER — SODIUM CHLORIDE, SODIUM LACTATE, POTASSIUM CHLORIDE, CALCIUM CHLORIDE 600; 310; 30; 20 MG/100ML; MG/100ML; MG/100ML; MG/100ML
50 INJECTION, SOLUTION INTRAVENOUS CONTINUOUS
Status: DISCONTINUED | OUTPATIENT
Start: 2022-01-18 | End: 2022-01-18 | Stop reason: HOSPADM

## 2022-01-18 RX ORDER — CELECOXIB 200 MG/1
200 CAPSULE ORAL ONCE
Status: COMPLETED | OUTPATIENT
Start: 2022-01-18 | End: 2022-01-18

## 2022-01-18 RX ORDER — ONDANSETRON 2 MG/ML
4 INJECTION INTRAMUSCULAR; INTRAVENOUS
Status: DISCONTINUED | OUTPATIENT
Start: 2022-01-18 | End: 2022-01-19 | Stop reason: HOSPADM

## 2022-01-18 RX ORDER — MIDAZOLAM HYDROCHLORIDE 1 MG/ML
1 INJECTION, SOLUTION INTRAMUSCULAR; INTRAVENOUS AS NEEDED
Status: DISCONTINUED | OUTPATIENT
Start: 2022-01-18 | End: 2022-01-18 | Stop reason: HOSPADM

## 2022-01-18 RX ORDER — HYDROMORPHONE HYDROCHLORIDE 1 MG/ML
0.5 INJECTION, SOLUTION INTRAMUSCULAR; INTRAVENOUS; SUBCUTANEOUS
Status: DISCONTINUED | OUTPATIENT
Start: 2022-01-18 | End: 2022-01-19 | Stop reason: HOSPADM

## 2022-01-18 RX ORDER — FACIAL-BODY WIPES
10 EACH TOPICAL DAILY PRN
Status: DISCONTINUED | OUTPATIENT
Start: 2022-01-20 | End: 2022-01-19 | Stop reason: HOSPADM

## 2022-01-18 RX ORDER — NALOXONE HYDROCHLORIDE 0.4 MG/ML
0.4 INJECTION, SOLUTION INTRAMUSCULAR; INTRAVENOUS; SUBCUTANEOUS AS NEEDED
Status: DISCONTINUED | OUTPATIENT
Start: 2022-01-18 | End: 2022-01-19 | Stop reason: HOSPADM

## 2022-01-18 RX ORDER — ONDANSETRON 2 MG/ML
INJECTION INTRAMUSCULAR; INTRAVENOUS AS NEEDED
Status: DISCONTINUED | OUTPATIENT
Start: 2022-01-18 | End: 2022-01-18 | Stop reason: HOSPADM

## 2022-01-18 RX ORDER — SODIUM CHLORIDE 9 MG/ML
125 INJECTION, SOLUTION INTRAVENOUS CONTINUOUS
Status: DISCONTINUED | OUTPATIENT
Start: 2022-01-18 | End: 2022-01-19 | Stop reason: HOSPADM

## 2022-01-18 RX ORDER — PROPOFOL 10 MG/ML
INJECTION, EMULSION INTRAVENOUS
Status: DISCONTINUED | OUTPATIENT
Start: 2022-01-18 | End: 2022-01-18 | Stop reason: HOSPADM

## 2022-01-18 RX ORDER — PHENYLEPHRINE HCL IN 0.9% NACL 0.4MG/10ML
SYRINGE (ML) INTRAVENOUS AS NEEDED
Status: DISCONTINUED | OUTPATIENT
Start: 2022-01-18 | End: 2022-01-18 | Stop reason: HOSPADM

## 2022-01-18 RX ORDER — OXYCODONE HYDROCHLORIDE 5 MG/1
2.5 TABLET ORAL
Status: DISCONTINUED | OUTPATIENT
Start: 2022-01-18 | End: 2022-01-19 | Stop reason: HOSPADM

## 2022-01-18 RX ORDER — OXYCODONE HYDROCHLORIDE 5 MG/1
5 TABLET ORAL
Status: DISCONTINUED | OUTPATIENT
Start: 2022-01-18 | End: 2022-01-19 | Stop reason: HOSPADM

## 2022-01-18 RX ORDER — AMOXICILLIN 250 MG
1 CAPSULE ORAL 2 TIMES DAILY
Status: DISCONTINUED | OUTPATIENT
Start: 2022-01-18 | End: 2022-01-19 | Stop reason: HOSPADM

## 2022-01-18 RX ORDER — KETOROLAC TROMETHAMINE 30 MG/ML
15 INJECTION, SOLUTION INTRAMUSCULAR; INTRAVENOUS EVERY 6 HOURS
Status: DISCONTINUED | OUTPATIENT
Start: 2022-01-18 | End: 2022-01-19 | Stop reason: HOSPADM

## 2022-01-18 RX ORDER — PREGABALIN 75 MG/1
75 CAPSULE ORAL ONCE
Status: COMPLETED | OUTPATIENT
Start: 2022-01-18 | End: 2022-01-18

## 2022-01-18 RX ORDER — HYDROXYZINE HYDROCHLORIDE 10 MG/1
10 TABLET, FILM COATED ORAL
Status: DISCONTINUED | OUTPATIENT
Start: 2022-01-18 | End: 2022-01-19 | Stop reason: HOSPADM

## 2022-01-18 RX ADMIN — Medication 40 MCG/MIN: at 11:36

## 2022-01-18 RX ADMIN — PREGABALIN 75 MG: 75 CAPSULE ORAL at 10:08

## 2022-01-18 RX ADMIN — Medication 40 MCG: at 11:35

## 2022-01-18 RX ADMIN — WATER 2 G: 1 INJECTION INTRAMUSCULAR; INTRAVENOUS; SUBCUTANEOUS at 19:08

## 2022-01-18 RX ADMIN — TRANEXAMIC ACID 1 G: 100 INJECTION, SOLUTION INTRAVENOUS at 11:05

## 2022-01-18 RX ADMIN — PROPOFOL 30 MG: 10 INJECTION, EMULSION INTRAVENOUS at 10:51

## 2022-01-18 RX ADMIN — PROPOFOL 50 MCG/KG/MIN: 10 INJECTION, EMULSION INTRAVENOUS at 10:50

## 2022-01-18 RX ADMIN — BUPIVACAINE HYDROCHLORIDE 10 MG: 5 INJECTION, SOLUTION EPIDURAL; INTRACAUDAL; PERINEURAL at 11:11

## 2022-01-18 RX ADMIN — SODIUM CHLORIDE, POTASSIUM CHLORIDE, SODIUM LACTATE AND CALCIUM CHLORIDE: 600; 310; 30; 20 INJECTION, SOLUTION INTRAVENOUS at 10:45

## 2022-01-18 RX ADMIN — SENNOSIDES AND DOCUSATE SODIUM 1 TABLET: 50; 8.6 TABLET ORAL at 19:08

## 2022-01-18 RX ADMIN — OXYCODONE HYDROCHLORIDE 2.5 MG: 5 TABLET ORAL at 22:24

## 2022-01-18 RX ADMIN — ASPIRIN 81 MG: 81 TABLET, COATED ORAL at 20:28

## 2022-01-18 RX ADMIN — SODIUM CHLORIDE, PRESERVATIVE FREE 5 ML: 5 INJECTION INTRAVENOUS at 15:00

## 2022-01-18 RX ADMIN — ROPIVACAINE HYDROCHLORIDE 25 ML: 5 INJECTION, SOLUTION EPIDURAL; INFILTRATION; PERINEURAL at 10:35

## 2022-01-18 RX ADMIN — KETOROLAC TROMETHAMINE 15 MG: 30 INJECTION, SOLUTION INTRAMUSCULAR; INTRAVENOUS at 20:28

## 2022-01-18 RX ADMIN — MIDAZOLAM 2 MG: 1 INJECTION INTRAMUSCULAR; INTRAVENOUS at 10:36

## 2022-01-18 RX ADMIN — WATER 2 G: 1 INJECTION INTRAMUSCULAR; INTRAVENOUS; SUBCUTANEOUS at 11:15

## 2022-01-18 RX ADMIN — CELECOXIB 200 MG: 200 CAPSULE ORAL at 10:08

## 2022-01-18 RX ADMIN — ONDANSETRON HYDROCHLORIDE 4 MG: 2 INJECTION, SOLUTION INTRAMUSCULAR; INTRAVENOUS at 13:06

## 2022-01-18 NOTE — H&P
Mr. Arturo Garcia is a 80-year-old gentleman presenting for right total knee replacement for treatment of severe osteoarthritis. He has had progressive symptoms over the last few years. Severe pain at start up. Pain with all weightbearing activities. He loves to play tennis but is now limited to just sitting around twice weekly due to pain for several days after. He has been having significant mechanical symptoms without falls. He is now having more regular wakening night pain. Multiple corticosteroid injections with decreasing efficacy. Celebrex has not helped. He is unhappy with progressive pain and dysfunction, declining quality of life. Past Medical History:   Diagnosis Date    Arthritis     SHOULDER, R KNEE, R THUMB, LOWER BACK     Basal cell cancer     multiple    Chronic pain      AND L SHOULDER, LOWER BACK    Hyperlipidemia     Hypertension     [address]anoma ([facility] [address]     multiple    Squamous cell carcinoma     multiple     Past Surgical History:   Procedure Laterality Date    COLONOSCOPY N/A 11/19/2021    COLONOSCOPY   :- performed by Milton Villatoro MD at [facility] ENDOSCOPY    HX COLONOSCOPY  2006    HX COLONOSCOPY  07/25/2016    diverticulosis; otherwise normal    HX CYST REMOVAL Left 1961    FOOT    HX HERNIA REPAIR  2008    bilateral     HX KNEE REPLACEMENT Left 2010    HX KNEE REPLACEMENT Left 7/28/16    due to failed replacement    HX OTHER SURGICAL  03/06/2019    banding of hemorrhoids    HX SHOULDER REPLACEMENT Right 11/12/2020    HX TONSILLECTOMY      HX WISDOM TEETH EXTRACTION       Allergies   Allergen Reactions    Acetaminophen Other (comments)     UNABLE TO URINATE, CAUSES FLANK PAIN     No current facility-administered medications on file prior to encounter.      Current Outpatient Medications on File Prior to Encounter   Medication Sig Dispense Refill    amoxicillin (AMOXIL) 500 mg capsule TAKE 4 CAPSULES BY MOUTH 1 HOUR PRIOR TO DENTAL APPT (Patient not taking: Reported on 2021)  3    multivitamin (ONE A DAY) tablet Take 1 Tab by mouth daily. Social History     Socioeconomic History    Marital status:      Spouse name: Not on file    Number of children: Not on file    Years of education: Not on file    Highest education level: Not on file   Occupational History    Not on file   Tobacco Use    Smoking status: Former Smoker     Packs/day: 2.00     Years: 15.00     Pack years: 30.00     Types: Cigarettes     Quit date: 1970     Years since quittin.6    Smokeless tobacco: Never Used   Vaping Use    Vaping Use: Never used   Substance and Sexual Activity    Alcohol use: Yes     Alcohol/week: 4.0 - 5.0 standard drinks     Types: 4 - 5 Cans of beer per week     Comment: occasional    Drug use: Yes     Types: Marijuana    Sexual activity: Not Currently     Partners: Female     Birth control/protection: Abstinence   Other Topics Concern    Not on file   Social History Narrative    Not on file     Social Determinants of Health     Financial Resource Strain:     Difficulty of Paying Living Expenses: Not on file   Food Insecurity:     Worried About Running Out of Food in the Last Year: Not on file    Francie of Food in the Last Year: Not on file   Transportation Needs:     Lack of Transportation (Medical): Not on file    Lack of Transportation (Non-Medical):  Not on file   Physical Activity:     Days of Exercise per Week: Not on file    Minutes of Exercise per Session: Not on file   Stress:     Feeling of Stress : Not on file   Social Connections:     Frequency of Communication with Friends and Family: Not on file    Frequency of Social Gatherings with Friends and Family: Not on file    Attends Bahai Services: Not on file    Active Member of Clubs or Organizations: Not on file    Attends Club or Organization Meetings: Not on file    Marital Status: Not on file   Intimate Partner Violence:     Fear of Current or Ex-Partner: Not on file  Emotionally Abused: Not on file    Physically Abused: Not on file    Sexually Abused: Not on file   Housing Stability:     Unable to Pay for Housing in the Last Year: Not on file    Number of Places Lived in the Last Year: Not on file    Unstable Housing in the Last Year: Not on file     Cancer-related family history includes Cancer in his mother. ROS: Denies chest pain and palpitations. Cough or shortness of breath. No fever chills or constitutional symptoms. No headaches blurred vision or vision changes. No nausea vomiting diarrhea. Relates urinary retention. Exam:  There were no vitals taken for this visit. Alert  Chest clear  Heart regular rate and rhythm  Abdomen soft nontender  Right knee is in varus. No effusion. Diffuse tenderness. Crepitus with motion. No distal motor or sensory deficits. No distal edema. Recent right knee x-rays demonstrate severe osteoarthritis with complete loss of medial joint space. Vascular calcification evident. Imp/Plan:  Severe osteoarthritis right knee  Proceed with right total knee replacement. Discussed risks and benefits in detail as well as anticipated hospital stay and course of rehabilitation. All questions answered.     COREY Oseguera MD

## 2022-01-18 NOTE — PROGRESS NOTES
Problem: Mobility Impaired (Adult and Pediatric)  Goal: *Acute Goals and Plan of Care (Insert Text)  Description: FUNCTIONAL STATUS PRIOR TO ADMISSION: Patient was modified independent using a single point cane and right knee brace for functional mobility. HOME SUPPORT PRIOR TO ADMISSION: The patient lived with wife but did not require assist.    Physical Therapy Goals  Initiated 1/18/2022    1. Patient will move from supine to sit and sit to supine  and scoot up and down in bed with modified independence within 4 days. 2. Patient will perform sit to stand with modified independence within 4 days. 3. Patient will ambulate with modified independence for > 150 feet with the least restrictive device within 4 days. 4. Patient will ascend/descend 4 stairs with one handrail(s) with modified independence within 4 days. 5. Patient will perform TKR home exercise program per protocol with supervision/set-up within 4 days. PHYSICAL THERAPY EVALUATION  Patient: Samaria Riggins (74 y.o. male)  Date: 1/18/2022  Primary Diagnosis: Osteoarthritis of right knee, unspecified osteoarthritis type [M17.11]  Primary localized osteoarthritis of right knee [M17.11]  Procedure(s) (LRB):  RIGHT TOTAL KNEE ARTHROPLASTY (BLOCK W/IV SEDATION) (Right) Day of Surgery   Precautions: ***  Fall,WBAT    ASSESSMENT  Based on the objective data described below, the patient presents with ***. Current Level of Function Impacting Discharge (mobility/balance): ***    Functional Outcome Measure: The patient scored *** on the *** outcome measure which is indicative of ***. Other factors to consider for discharge: ***     Patient will benefit from skilled therapy intervention to address the above noted impairments.        PLAN :  Recommendations and Planned Interventions: {recommendations and planned interventions PT:59114}      Frequency/Duration: Patient will be followed by physical therapy:  {FREQUENCY:22947} to address goals.     Recommendation for discharge: (in order for the patient to meet his/her long term goals)  {therapy discharge recs PT:07823}    This discharge recommendation:  {therapy discharge collaboration:26002}    IF patient discharges home will need the following DME: {DME Devices:74108}         SUBJECTIVE:   Patient stated ***.    OBJECTIVE DATA SUMMARY:   HISTORY:    Past Medical History:   Diagnosis Date    Arthritis     SHOULDER, R KNEE, R THUMB, LOWER BACK     Basal cell cancer     multiple    Chronic pain      AND L SHOULDER, LOWER BACK    Hyperlipidemia     Hypertension     Melanoma ([facility] [address]     multiple    Squamous cell carcinoma     multiple     Past Surgical History:   Procedure Laterality Date    COLONOSCOPY N/A 11/19/2021    COLONOSCOPY   :- performed by Lisa Huerta MD at [facility] ENDOSCOPY    HX COLONOSCOPY  2006    HX COLONOSCOPY  07/25/2016    diverticulosis; otherwise normal    HX CYST REMOVAL Left [address] HERNIA REPAIR  2008    bilateral     HX KNEE REPLACEMENT Left 2010    HX KNEE REPLACEMENT Left 7/28/16    due to failed replacement    HX OTHER SURGICAL  03/06/2019    banding of hemorrhoids    HX SHOULDER REPLACEMENT Right 11/12/2020    HX TONSILLECTOMY      HX WISDOM TEETH EXTRACTION         Personal factors and/or comorbidities impacting plan of care: as above    Home Situation  Home Environment: Private residence  # Steps to Enter: 4  Rails to Enter: Yes  Hand Rails : Bilateral (Unable to reach both railings )  Wheelchair Ramp: No  One/Two Story Residence: One story  Living Alone: No  Support Systems: Spouse/Significant Other  Patient Expects to be Discharged to[de-identified] House  Current DME Used/Available at Home: Walker, rolling,Cane, straight,Crutches,Grab bars,Raised toilet seat,Shower chair  Tub or Shower Type: Tub/Shower combination    EXAMINATION/PRESENTATION/DECISION MAKING:   Critical Behavior:  Neurologic State: Alert  Orientation Level: Oriented X4  Cognition: Appropriate for age attention/concentration,Appropriate decision making     Hearing:     Skin:  ***  Edema: ***  Range Of Motion:                          Strength: Tone & Sensation:                                  Coordination:     Vision:      Functional Mobility:  Bed Mobility:              Transfers:                             Balance:      Ambulation/Gait Training:                                                      ***   Stairs: Therapeutic Exercises:   ***    Functional Measure:  ***       Physical Therapy Evaluation Charge Determination   History Examination Presentation Decision-Making   {PT OP History:89713} {PT OP Examination:09855} {PT OP Presentation:00341} {PT OP Decision Makin}      Based on the above components, the patient evaluation is determined to be of the following complexity level: {PT OP Complexity:08437}    Pain Rating:  ***    Activity Tolerance:   {therapy activity tolerance:33867}    After treatment patient left in no apparent distress:   {AFTER therapy treatment:14429}    COMMUNICATION/EDUCATION:   The patients plan of care was discussed with: {POC discussed VYUM:45310}.      {education goal setting and plan of care PT:21342}    Thank you for this referral.  Avis Anderson, PT

## 2022-01-18 NOTE — DISCHARGE SUMMARY
[address] [facility]     DISCHARGE SUMMARY     Name: Valery Monroe       MR#: [id number]    : 1945  ADMIT DATE: 2022  DISCHARGE DATE: 2022     ADMISSION DIAGNOSIS: Osteoarthritis of right knee, unspecified osteoarthritis type [M17.11]  Primary localized osteoarthritis of right knee [M17.11]     DISCHARGE DIAGNOSIS: Osteoarthritis of right knee, unspecified osteoarthritis type [M17.11]     PROCEDURE PERFORMED: Procedure(s):  RIGHT TOTAL KNEE ARTHROPLASTY (BLOCK W/IV SEDATION)     CONSULTATIONS:  None.     HISTORY OF PRESENT ILLNESS: The patient is a 75-year-old gentleman with progressive right knee pain due to severe osteoarthritis. Symptoms have progressed despite comprehensive conservative treatment. He presents for right total knee replacement. Risks, benefits, and alternatives of procedure were reviewed with him in detail and he desires to proceed.     HOSPITAL COURSE:  The patient underwent the aforementioned procedure on date of admission under spinal anesthesia with adductor canal block. There were no immediate postoperative complications. He was started on a multimodal pain regimen and DVT prophylaxis.     DISPOSITION: The patient made slow, steady progress with physical therapy and was appropriate for discharge to Home in stable condition on postoperative day 1. DISCHARGE MEDICATIONS:  Reinitiate preadmission medications. In addition, the patient will be on ASA for DVT prophylaxis and low dose oxycodone for pain. DISCHARGE INSTRUCTIONS:  Detailed printed instructions were provided to the patient. Follow up with Dr. Ray Kang in approximately 3 weeks. The patient will receive home health physical therapy in the meantime.     Signed by: Franklyn Durán PA-C  2022 operating room

## 2022-01-18 NOTE — BRIEF OP NOTE
Brief Postoperative Note    Patient: Edwina Rogers  YOB: 1945  MRN: [id number]    Date of Procedure: 1/18/2022     Pre-Op Diagnosis: Osteoarthritis of right knee, unspecified osteoarthritis type [M17.11]    Post-Op Diagnosis: Same as preoperative diagnosis. Procedure(s):  RIGHT TOTAL KNEE ARTHROPLASTY (BLOCK W/IV SEDATION)    Surgeon(s):  Alyssa Steele MD    Surgical Assistant: Physician Assistant: Mehdi Cerda PA-C  Surg Asst-1: Rickie GARCIA    Anesthesia: Spinal     Estimated Blood Loss (mL): 338    Complications: None    Specimens: * No specimens in log *     Implants:   Implant Name Type Inv.  Item Serial No.  Lot No. LRB No. Used Action   CEMENT BNE GENTAMICIN 40 GM SMARTSET GMV - SNA  CEMENT BNE GENTAMICIN 40 GM SMARTSET GMV NA [employer]_ [id number] Right 2 Implanted   COMPONENT FEM SZ 10 RT KNEE NP EMPOWR 3D - SNA  COMPONENT FEM SZ 10 RT KNEE NP EMPOWR 3D NA [employer]_ [id number] Right 1 Implanted   BASEPLATE TIB SZ 10 RT KNEE NP STEMMABLE EMPOWR - SNA  BASEPLATE TIB SZ 10 RT KNEE NP STEMMABLE EMPOWR NA [employer]_ [id number] Right 1 Implanted   COMPONENT PAT OD38MM THK9MM POLYETH DOMED STYL TRI PEG EPIK - SNA  COMPONENT PAT OD38MM THK9MM POLYETH DOMED STYL TRI PEG EPIK NA [employer]_ [id number] Right 1 Implanted   INSERT TIB SZ 10 [id number] RT KNEE EMPOWR 3D E + - SNA  INSERT TIB SZ 10 [id number] RT KNEE EMPOWR 3D E + NA [employer]_ [id number] Right 1 Implanted       Drains: * No LDAs found *    Findings: oa right knee    Electronically Signed by Raven Molina PA-C on 1/18/2022 at 1:16 PM

## 2022-01-18 NOTE — ANESTHESIA PROCEDURE NOTES
Spinal Block    Performed by: Raynold Sandhoff, CRNA  Authorized by: Starr Garcia DO     Pre-procedure:  Preanesthetic Checklist: patient identified, risks and benefits discussed, anesthesia consent, site marked, patient being monitored and timeout performed    Timeout Time: 11:00 EST          Spinal Block:   Patient Position:  Seated  Prep Region:  Lumbar  Prep: Betadine      Location:  L3-4  Technique:  Single shot        Needle:   Needle Type:  Pencil-tip  Needle Gauge:  24 G  Attempts:  2      Events: CSF confirmed, no blood with aspiration and no paresthesia        Assessment:  Insertion:  Uncomplicated  Patient tolerance:  Patient tolerated the procedure well with no immediate complications

## 2022-01-18 NOTE — ANESTHESIA PROCEDURE NOTES
Peripheral Block    Performed by: Moncho Marin DO  Authorized by: Moncho Marin DO       Pre-procedure: Indications: at surgeon's request and post-op pain management    Preanesthetic Checklist: patient identified, risks and benefits discussed, site marked, timeout performed and patient being monitored      Block Type:   Block Type:   Adductor canal  Laterality:  Right  Monitoring:  Standard ASA monitoring, continuous pulse ox, frequent vital sign checks, heart rate, responsive to questions and oxygen  Injection Technique:  Single shot  Procedures: ultrasound guided    Patient Position: supine  Prep: chlorhexidine    Location:  Mid thigh  Needle Type:  Stimuplex  Needle Gauge:  21 G  Needle Localization:  Ultrasound guidance and anatomical landmarks  Medication Injected:  Ropivacaine (PF) (NAROPIN)(0.5%) 5 mg/mL injection, 25 mL    Assessment:  Number of attempts:  1  Injection Assessment:  Incremental injection every 5 mL, local visualized surrounding nerve on ultrasound, negative aspiration for blood, no paresthesia and no intravascular symptoms  Patient tolerance:  Patient tolerated the procedure well with no immediate complications

## 2022-01-18 NOTE — PERIOP NOTES
TRANSFER - OUT REPORT:    Verbal report given to [facility], RN on Cory Huynh  being transferred to [address] [place], [place] [address] for routine post - op       Report consisted of patients Situation, Background, Assessment and   Recommendations(SBAR). Time Pre op antibiotic given:1115  Anesthesia Stop time: 2826  Morton Present on Transfer to floor:yes  Order for Morton on Chart:yes  Discharge Prescriptions with Chart:no    Information from the following report(s) SBAR, OR Summary, Intake/Output and MAR was reviewed with the receiving nurse. Opportunity for questions and clarification was provided. Is the patient on 02? YES       L/Min 1       Other -    Is the patient on a monitor? NO    Is the nurse transporting with the patient? NO    Surgical Waiting Area notified of patient's transfer from PACU? YES      The following personal items collected during your admission accompanied patient upon transfer:   Dental Appliance: Dental Appliances:  (2 bags of clothes, gray backpack; glasses on, H. aids in)  Vision: Visual Aid: Glasses,With patient  Hearing Aid: Hearing Aid: Bilateral  Jewelry: Jewelry: None  Clothing: Clothing: Footwear,Pants,Shirt,Socks,Undergarments  Other Valuables:  Other Valuables: Eyeglasses,Other (comment) (Hearing Aids)  Valuables sent to safe:

## 2022-01-18 NOTE — PROGRESS NOTES
Primary Nurse Collette Magana RN and Chalry Duarte RN performed a dual skin assessment on this patient. No impairment noted. Dionte score is 21.

## 2022-01-18 NOTE — DISCHARGE INSTRUCTIONS
Post-op Discharge Instructions Following Total Joint Replacement  MD Jarret Lujan 11  [phone]  [place] See Dr. Fanny Estes approximately 3-4 weeks from date of surgery. Call [phone] to make an appointment.  Call Dallas Pompa RN if you have questions or concerns, [phone]. Activity   Use your walker for ambulation. Weight bearing as tolerated unless instructed otherwise by the physical therapist. Get up every hour you are awake and take a brief walk. Lengthen walking distance daily as your strength improves.  Continue using your walker until seen in the office for your first follow up visit.  Practice your exercises 3 times daily as instructed by the physical therapist. Dominick Lips for 20 minutes after exercising.  No driving until seen in the office for your first follow up visit. Incision Care   The light brown Aquacel surgical dressing is waterproof and is to remain on your incision for 7 days. On the 7th day, carefully lift the edge of the dressing to break the adhesive seal and gently peel it off.  If your Aquacel dressings comes loose or falls off before the 7th day, replace it with a dry sterile gauze dressing and change this dressing daily. Once there is no drainage on the bandage, you mean leave the incision open to air.  You may take a shower with the Aquacel dressing in place. After you remove the Aquacel dressing on day 7, you may continue to shower and get your incision wet in the shower. Do not submerge your incision under water in a bathtub, hot tub, swimming pool, etc. until after you have been evaluated at your first office visit. Medications   Blood Clot Prevention: Take medication as prescribed by your physician for 4 weeks postop.  Pain Management: Take pain medication as prescribed; wean yourself off of pain medication as your pain lessens. Take with food.  You make also take Tylenol every 4-6 hours as needed for pain.   Do not exceed 3 grams (3000mg) per day.  Place an ice bag on or around the incision for 20 minutes on / 20 minutes off as needed throughout the day and night, especially after exercising.  Stool Softener: You may want to take a stool softener (such as Senokot-S or Colace) to prevent constipation while taking pain medication. If constipation occurs, you may also use a laxative (such as Dulcolax tablets, Miralax, or a suppository). Diet   Resume usual diet at home. Drink plenty of fluids. Eat foods high in fiber and protein. Calcium and Vitamin D supplements recommended. Avoid alcoholic beverages. No smoking. When to call your Orthopaedic Surgeon: If you call after 5pm or on a weekend, the on call physician will return your call   Pain that is not relieved by pain medication, ice, and activity modification   Signs of infection (red incision, continuous drainage from the incision, malodorous drainage, persistent fever greater than 101 degrees Fahrenheit)   Signs of a blood clot in your leg (calf pain, tenderness, redness, and/or swelling of the lower leg)  ?   When to call your Primary Care Physician   Concerns about your medical conditions such as diabetes, high blood pressure, asthma, congestive heart failure   Call if blood sugars are elevated, if you have a persistent headache or dizziness, coughing or congestion, constipation or diarrhea, burning with urination, abnormal heart rate (fast or slow)  When to call 911 and go to the nearest Emergency Room   Acute onset of chest pain, shortness of breath, difficulty breathing

## 2022-01-18 NOTE — ANESTHESIA PREPROCEDURE EVALUATION
Anesthetic History   No history of anesthetic complications            Review of Systems / Medical History  Patient summary reviewed, nursing notes reviewed and pertinent labs reviewed    Pulmonary          Smoker         Neuro/Psych         Neuromuscular disease     Cardiovascular    Hypertension        Dysrhythmias            GI/Hepatic/Renal  Within defined limits              Endo/Other        Arthritis     Other Findings            Physical Exam    Airway  Mallampati: I  TM Distance: > 6 cm  Neck ROM: normal range of motion   Mouth opening: Normal     Cardiovascular  Regular rate and rhythm,  S1 and S2 normal,  no murmur, click, rub, or gallop             Dental  No notable dental hx       Pulmonary  Breath sounds clear to auscultation               Abdominal  GI exam deferred       Other Findings            Anesthetic Plan    ASA: 2  Anesthesia type: MAC, regional and spinal          Induction: Intravenous  Anesthetic plan and risks discussed with: Patient

## 2022-01-19 VITALS
BODY MASS INDEX: 20.53 KG/M2 | OXYGEN SATURATION: 97 % | WEIGHT: 160 LBS | RESPIRATION RATE: 16 BRPM | TEMPERATURE: 97.9 F | SYSTOLIC BLOOD PRESSURE: 129 MMHG | HEIGHT: 74 IN | HEART RATE: 83 BPM | DIASTOLIC BLOOD PRESSURE: 75 MMHG

## 2022-01-19 LAB
ANION GAP SERPL CALC-SCNC: 4 MMOL/L (ref 5–15)
BUN SERPL-MCNC: 20 MG/DL (ref 6–20)
BUN/CREAT SERPL: 28 (ref 12–20)
CALCIUM SERPL-MCNC: 8 MG/DL (ref 8.5–10.1)
CHLORIDE SERPL-SCNC: 113 MMOL/L (ref 97–108)
CO2 SERPL-SCNC: 24 MMOL/L (ref 21–32)
CREAT SERPL-MCNC: 0.72 MG/DL (ref 0.7–1.3)
GLUCOSE SERPL-MCNC: 92 MG/DL (ref 65–100)
HGB BLD-MCNC: 11 G/DL (ref 12.1–17)
POTASSIUM SERPL-SCNC: 3.3 MMOL/L (ref 3.5–5.1)
SODIUM SERPL-SCNC: 141 MMOL/L (ref 136–145)

## 2022-01-19 PROCEDURE — 74011000250 HC RX REV CODE- 250: Performed by: PHYSICIAN ASSISTANT

## 2022-01-19 PROCEDURE — 80048 BASIC METABOLIC PNL TOTAL CA: CPT

## 2022-01-19 PROCEDURE — 74011250636 HC RX REV CODE- 250/636: Performed by: PHYSICIAN ASSISTANT

## 2022-01-19 PROCEDURE — 85018 HEMOGLOBIN: CPT

## 2022-01-19 PROCEDURE — 74011250637 HC RX REV CODE- 250/637: Performed by: PHYSICIAN ASSISTANT

## 2022-01-19 PROCEDURE — 97116 GAIT TRAINING THERAPY: CPT | Performed by: PHYSICAL THERAPIST

## 2022-01-19 PROCEDURE — 36415 COLL VENOUS BLD VENIPUNCTURE: CPT

## 2022-01-19 RX ADMIN — POLYETHYLENE GLYCOL 3350 17 G: 17 POWDER, FOR SOLUTION ORAL at 08:52

## 2022-01-19 RX ADMIN — SENNOSIDES AND DOCUSATE SODIUM 1 TABLET: 50; 8.6 TABLET ORAL at 08:52

## 2022-01-19 RX ADMIN — ASPIRIN 81 MG: 81 TABLET, COATED ORAL at 08:52

## 2022-01-19 RX ADMIN — WATER 2 G: 1 INJECTION INTRAMUSCULAR; INTRAVENOUS; SUBCUTANEOUS at 03:22

## 2022-01-19 RX ADMIN — SODIUM CHLORIDE, PRESERVATIVE FREE 10 ML: 5 INJECTION INTRAVENOUS at 06:00

## 2022-01-19 RX ADMIN — TAMSULOSIN HYDROCHLORIDE 0.4 MG: 0.4 CAPSULE ORAL at 08:52

## 2022-01-19 RX ADMIN — SODIUM CHLORIDE 125 ML/HR: 9 INJECTION, SOLUTION INTRAVENOUS at 03:25

## 2022-01-19 RX ADMIN — KETOROLAC TROMETHAMINE 15 MG: 30 INJECTION, SOLUTION INTRAMUSCULAR; INTRAVENOUS at 08:52

## 2022-01-19 RX ADMIN — KETOROLAC TROMETHAMINE 15 MG: 30 INJECTION, SOLUTION INTRAMUSCULAR; INTRAVENOUS at 03:22

## 2022-01-19 RX ADMIN — ATORVASTATIN CALCIUM 10 MG: 10 TABLET, FILM COATED ORAL at 08:52

## 2022-01-19 NOTE — PROGRESS NOTES
Bedside shift change report given to Barbara Armendariz (oncoming nurse) by Steph Cárdenas (offgoing nurse). Report included the following information SBAR, Kardex, Intake/Output, MAR and Recent Results.

## 2022-01-19 NOTE — ANESTHESIA POSTPROCEDURE EVALUATION
Post-Anesthesia Evaluation and Assessment    Patient: Freddy Mobley MRN: [id number]  SSN: [id number]    YOB: 1945  Age: 68 y.o. Sex: male      I have evaluated the patient and they are stable and ready for discharge from the PACU. Cardiovascular Function/Vital Signs  HR: 61  BP: 114/77  RR: 17  SpO2: 96% on 1L nasal cannula  Temp: 36.5 C    Patient is status post Spinal anesthesia for Procedure(s):  RIGHT TOTAL KNEE ARTHROPLASTY (BLOCK W/IV SEDATION). Nausea/Vomiting: None    Postoperative hydration reviewed and adequate. Pain:  Managed    Neurological Status: At baseline    Mental Status, Level of Consciousness: Alert and  oriented to person, place, and time    Pulmonary Status:   Adequate oxygenation and airway patent    Complications related to anesthesia: None    Post-anesthesia assessment completed.  No concerns    Signed By: Yanci Toscano DO     January 18, 2022

## 2022-01-19 NOTE — PROGRESS NOTES
ANNELIESE: The patient plans to discharge home with [address] [place] [address] and wife to transport when stable for discharge. RUR: N/A    1. The patient is from home and lives with wife. 2. Orthopedics, PT/OT following. 3. The patient plans to discharge home with home health. Medicare Outpatient Observation Notice (MOON) provided to patient/representative with verbal explanation of the notice. Time allotted for questions regarding the notice. Patient /representative provided a completed copy of the MOON notice. Copy placed on bedside chart. Care Management Interventions  PCP Verified by CM: Yes  Palliative Care Criteria Met (RRAT>21 & CHF Dx)?: No  Transition of Care Consult (CM Consult): [address]: No  Reason Outside [place]: Physician referred to specific agency  MyChart Signup: Yes  Discharge Durable Medical Equipment: No  Health Maintenance Reviewed: Yes  Physical Therapy Consult: Yes  Occupational Therapy Consult: Yes  Speech Therapy Consult: No  Support Systems: Spouse/Significant Other  Confirm Follow Up Transport: Family  The Plan for Transition of Care is Related to the Following Treatment Goals : The patient needs home health please. The Patient and/or Patient Representative was Provided with a Choice of Provider and Agrees with the Discharge Plan?: Yes  Freedom of Choice List was Provided with Basic Dialogue that Supports the Patient's Individualized Plan of Care/Goals, Treatment Preferences and Shares the Quality Data Associated with the Providers?: Yes  [place] Resource Information Provided?: No  Discharge Location  Patient Expects to be Discharged to[de-identified] Home with home health     Reason for Admission:  Right Total Knee                     RUR Score:  N/A                   Plan for utilizing home health:        The Plan for Transition of Care is related to the following treatment goals: Home Health    The Patient and/or patient representative Lyndle Hashimoto Haritha Mason was provided with a choice of provider and agrees   with the discharge plan. [x] Yes [] No    Freedom of choice list was provided with basic dialogue that supports the patient's individualized plan of care/goals, treatment preferences and shares the quality data associated with the providers. [x] Yes [] No       PCP: First and Last name:  Christina Price MD, phone: [phone]     Name of Practice:    Are you a current patient: Yes/No: Yes   Approximate date of last visit: Dec, 2021   Can you participate in a virtual visit with your PCP: Yes                    Current Advanced Directive/Advance Care Plan: Full Code      Healthcare Decision Maker:   Click here to complete [address] including selection of the Healthcare Decision Maker Relationship (ie \"Primary\")                             Transition of Care Plan:       CM met with the patient in room 569. The patient is alert and oriented x4. Confirmed demographics. Prior to surgery, the patient was independent with ADL's/IADL's, drives a vehicle, owns a rolling walker and uses [facility] on [address]. The patient plans to discharge home with home health and wife to transport when stable for discharge. CM following for discharge needs.      Bailey Dsouza RN/CRM

## 2022-01-19 NOTE — PROGRESS NOTES
Problem: Mobility Impaired (Adult and Pediatric)  Goal: *Acute Goals and Plan of Care (Insert Text)  Description: FUNCTIONAL STATUS PRIOR TO ADMISSION: Patient was modified independent using a single point cane and right knee brace for functional mobility. Pt has been very active - playing Tennis 2 x wk until recently secondary to knee pain. HOME SUPPORT PRIOR TO ADMISSION: The patient lived with wife but did not require assist.    Physical Therapy Goals  Initiated 1/18/2022    1. Patient will move from supine to sit and sit to supine  and scoot up and down in bed with modified independence within 4 days. 2. Patient will perform sit to stand with modified independence within 4 days. 3. Patient will ambulate with modified independence for > 150 feet with the least restrictive device within 4 days. 4. Patient will ascend/descend 4 stairs with one handrail(s) with modified independence within 4 days. 5. Patient will perform TKR home exercise program per protocol with supervision/set-up within 4 days. PHYSICAL THERAPY EVALUATION  Patient: Mariella Bose (73 y.o. male)  Date: 1/18/2022  Primary Diagnosis: Osteoarthritis of right knee, unspecified osteoarthritis type [M17.11]  Primary localized osteoarthritis of right knee [M17.11]  Procedure(s) (LRB):  RIGHT TOTAL KNEE ARTHROPLASTY (BLOCK W/IV SEDATION) (Right) Day of Surgery   Precautions:   Fall,WBAT    ASSESSMENT  Based on the objective data described below, the patient presents POD# 0 right TKA with minimal pain right knee, decreased AROM/strength and function right leg, decreased activity tolerance, decline in functional mobility and impaired standing balance/gait with RW. Pt s/p left TKR and revision of Left TKR - so very knowledgeable regarding exercise and post op progression. Pt mobilized without difficulty. Anticipate pt will be cleared by PT for discharge after am session.     Current Level of Function Impacting Discharge (mobility/balance): Standby to contact guard for transfers/amb with RW    Functional Outcome Measure: The patient scored 45/100 on the Barthel Index outcome measure (has post op lamb in place) . Other factors to consider for discharge: wife to assist as needed     Patient will benefit from skilled therapy intervention to address the above noted impairments. PLAN :  Recommendations and Planned Interventions: bed mobility training, transfer training, gait training, therapeutic exercises, patient and family training/education, and therapeutic activities      Frequency/Duration: Patient will be followed by physical therapy:  twice daily to address goals. Recommendation for discharge: (in order for the patient to meet his/her long term goals)  Physical therapy at least 2 days/week in the home     This discharge recommendation:  Has been made in collaboration with the attending provider and/or case management    IF patient discharges home will need the following DME: patient owns DME required for discharge         SUBJECTIVE:   Patient stated Desmond Brown will do whatever you say - I want to get back to playing Tennis and seeing my friends.     OBJECTIVE DATA SUMMARY:   HISTORY:    Past Medical History:   Diagnosis Date    Arthritis     SHOULDER, R KNEE, R THUMB, LOWER BACK     Basal cell cancer     multiple    Chronic pain      AND L SHOULDER, LOWER BACK    Hyperlipidemia     Hypertension     Melanoma ([facility] [address]     multiple    Squamous cell carcinoma     multiple     Past Surgical History:   Procedure Laterality Date    COLONOSCOPY N/A 11/19/2021    COLONOSCOPY   :- performed by Ander Nicole MD at [facility]    HX COLONOSCOPY  2006    HX COLONOSCOPY  07/25/2016    diverticulosis; otherwise normal    HX CYST REMOVAL Left 1961    FOOT    HX HERNIA REPAIR  2008    bilateral     HX KNEE REPLACEMENT Left 2010    HX KNEE REPLACEMENT Left 7/28/16    due to failed replacement    HX OTHER SURGICAL  03/06/2019    banding of hemorrhoids HX SHOULDER REPLACEMENT Right 11/12/2020    HX TONSILLECTOMY      HX WISDOM TEETH EXTRACTION         Personal factors and/or comorbidities impacting plan of care: as above    Home Situation  Home Environment: Private residence  # Steps to Enter: 4  Rails to Enter: Yes  Hand Rails : Bilateral (Unable to reach both railings )  Wheelchair Ramp: No  One/Two Story Residence: One story  Living Alone: No  Support Systems: Spouse/Significant Other  Patient Expects to be Discharged to[de-identified] House  Current DME Used/Available at Home: Walker, rolling,Cane, straight,Crutches,Grab bars,Raised toilet seat,Shower chair  Tub or Shower Type: Tub/Shower combination    EXAMINATION/PRESENTATION/DECISION MAKING:   Critical Behavior:  Neurologic State: Alert  Orientation Level: Oriented X4  Cognition: Follows commands  Safety/Judgement: Awareness of environment,Good awareness of safety precautions  Skin:  right leg covered with ace wrap - no drainage noted    Range Of Motion:  AROM: Within functional limits (except right leg)                       Strength:    Strength: Within functional limits (except right leg)                    Tone & Sensation:   Tone: Normal              Sensation: Intact               Coordination:  Coordination: Within functional limits  Functional Mobility:  Bed Mobility:     Supine to Sit: Stand-by assistance  Sit to Supine: Stand-by assistance  Scooting: Stand-by assistance  Transfers:  Sit to Stand: Contact guard assistance  Stand to Sit: Contact guard assistance                       Balance:   Sitting: Intact; Without support  Standing: Impaired; With support  Standing - Static: Good;Constant support  Standing - Dynamic : Good;Constant support  Ambulation/Gait Training:  Distance (ft): 50 Feet (ft)  Assistive Device: Walker, rolling;Gait belt  Ambulation - Level of Assistance: Contact guard assistance; Adaptive equipment        Gait Abnormalities: Decreased step clearance  Right Side Weight Bearing: As tolerated  Left Side Weight Bearing: Full  Base of Support: Center of gravity altered;Shift to left  Stance: Right decreased  Speed/Asha: Slow  Step Length: Right shortened;Left shortened  Swing Pattern: Right asymmetrical       Therapeutic Exercises:   Pt demonstrated ankle pumps, quad sets, hamstring sets and heel slides - to be performed once hr when awake 5 - 10 reps as tolerated. Pt encouraged to use incentive spirometer once hr x 10. Functional Measure:  Barthel Index:    Bathin  Bladder: 0  Bowels: 10  Groomin  Dressin  Feeding: 10  Mobility: 0  Stairs: 0  Toilet Use: 5  Transfer (Bed to Chair and Back): 10  Total: 45/100       The Barthel ADL Index: Guidelines  1. The index should be used as a record of what a patient does, not as a record of what a patient could do. 2. The main aim is to establish degree of independence from any help, physical or verbal, however minor and for whatever reason. 3. The need for supervision renders the patient not independent. 4. A patient's performance should be established using the best available evidence. Asking the patient, friends/relatives and nurses are the usual sources, but direct observation and common sense are also important. However direct testing is not needed. 5. Usually the patient's performance over the preceding 24-48 hours is important, but occasionally longer periods will be relevant. 6. Middle categories imply that the patient supplies over 50 per cent of the effort. 7. Use of aids to be independent is allowed. Score Interpretation (from [address] [address])    Independent   60-79 Minimally independent   40-59 Partially dependent   20-39 Very dependent   <20 Totally dependent     -Latoya Ryan., Barthel, D.W. (1965). Functional evaluation: the Barthel Index. [facility] ([address] (1997). The Barthel activities of daily living index: self-reporting versus actual performance in the old (> or = 75 years). Journal of [address] [place] [address] 45(5), [facility], [facility]..F, [place] Bowling.Kevin. (1999). Measuring the change in disability after inpatient rehabilitation; comparison of the responsiveness of the Barthel Index and Functional Emmet Measure. Journal of Neurology, Neurosurgery, and Psychiatry, 66(4), 083-233. GABBY Wakefield, GLORIA Justice, & Dayton Ballard M.A. (2004) Assessment of post-stroke quality of life in cost-effectiveness studies: The usefulness of the Barthel Index and the EuroQoL-5D. Quality of Life Research, 15, 178-89           Physical Therapy Evaluation Charge Determination   History Examination Presentation Decision-Making   LOW Complexity : Zero comorbidities / personal factors that will impact the outcome / POC LOW Complexity : 1-2 Standardized tests and measures addressing body structure, function, activity limitation and / or participation in recreation  LOW Complexity : Stable, uncomplicated  LOW Complexity : FOTO score of       Based on the above components, the patient evaluation is determined to be of the following complexity level: LOW     Pain Rating:  Right knee 2/10    Activity Tolerance:   Good - POD# 0 tolerating supine TKR exercise and short distance amb with RW    After treatment patient left in no apparent distress:   Supine in bed, Call bell within reach, and ice applied right knee    COMMUNICATION/EDUCATION:   The patients plan of care was discussed with: Registered nurse. Fall prevention education was provided and the patient/caregiver indicated understanding., Patient/family have participated as able in goal setting and plan of care. , and Patient/family agree to work toward stated goals and plan of care.     Thank you for this referral.  Ildefonso Parks, PT   Time Calculation: 30 mins

## 2022-01-19 NOTE — OP NOTES
[facility] [facility]  OPERATIVE REPORT    Name:  Codie Tran  MR#:  [id number]  :  1945  ACCOUNT #:  [de-identified]  DATE OF SERVICE:  2022    PREOPERATIVE DIAGNOSIS:  Osteoarthritis, right knee. POSTOPERATIVE DIAGNOSIS:  Osteoarthritis, right knee. PROCEDURE PERFORMED:  Right total knee arthroplasty. SURGEON:  Yamileth Macedo MD    ASSISTANT:  Shirley Craven PA-C    ANESTHESIA:  Spinal with sedation as well as adductor canal block. COMPLICATIONS:  None. SPECIMENS REMOVED:  None. IMPLANTS:  Components implanted, DonJoy Empowr 3-D size 10 femur, size 10 tibial tray with 10-mm polyethylene insert and 38-mm patella. ESTIMATED BLOOD LOSS:  300 mL. INDICATIONS:  The patient is a 49-year-old gentleman with progressive right knee pain due to severe osteoarthritis. Symptoms have progressed despite comprehensive conservative treatment. He presents for right total knee replacement. Risks, benefits, and alternatives of procedure were reviewed with him in detail and he desires to proceed. PROCEDURE:  The Anesthesia team placed an adductor canal block in the right thigh before taking the patient to the operating room and they also placed a spinal.  Preoperative IV antibiotics were administered. Padded pneumatic tourniquet was placed around the right upper thigh. Right lower extremity was prepped and draped in the usual sterile fashion. Tourniquet was inflated to 275. Through a midline anterior knee incision, I performed a medial parapatellar arthrotomy. Progressive medial release was performed to facilitate exposure and soft tissue balance throughout the procedure. As soon as the knee was flexed, the tourniquet was released. 10-mm distal femoral resection was performed using OrthAlign to navigate a neutral cut relative to mechanical axis of the femur. PCL was excised as well as large notch osteophytes.   Proximal tibial resection was performed using an extramedullary alignment guide. Menisci were excised. Extensive medial release was required to produce a symmetrical extension gap with 10-mm spacer block. Knee was placed in 90 degrees of flexion and gap  was inserted. Soft tissue tension was applied and block was adjusted to produce 10-mm flexion space. Femoral rotation was drilled and the femur was sized and prepared for a size 10 component utilizing appropriate jig. Remaining posterior osteophytes were removed from the femur and subperiosteal posterior capsular release was performed. Extensive posterior release was required. I had previously extended a medial release around the entire posteromedial corner to the midline of the tibia posteriorly. Trials were inserted and with the 10-mm insert in place, the knee had full extension to gravity; however, there was only 30-40 degrees of hip flexion, the hamstrings were so tight that the knee would begin to flex. The patella was prepared for 38-mm component and tracked centrally using no thumbs technique. Tourniquet was reinflated. Trials were removed and tibial preparation was completed. Bony surfaces were copiously irrigated by pulse lavage and dried before the real components were cemented in place using antibiotic-impregnated cement. Excess cement was removed and the knee was reduced in full extension with the real insert in place during cement setup. Periarticular soft tissues were injected with solution containing 0.5% ropivacaine with epinephrine as well as clonidine and Toradol. Tourniquet was released. Wound was irrigated. Arthrotomy was closed with a combination of heavy Vicryl sutures and a running #2 Stratafix suture. Skin and subcutaneous layers were closed in layered fashion with Vicryl and a running Monocryl subcuticular stitch. Wound was dressed with Dermabond and Aquacel occlusive dressing as well as sterile compressive dressing.   Morton catheter was inserted, and left in place.  The patient was transported to the postanesthesia care unit in stable condition. All counts were correct at the end of the procedure. The physician assistant was critical throughout the procedure to assist with the patient positioning, retraction, and closure.   There were no [employer] residents or fellows available to Jerardo Kimball MD      [id number]  D:  01/18/2022 19:09  T:  01/19/2022 1:17  JOB #:  [id number]  CC:  MD Kim Garzon MD

## 2022-01-19 NOTE — NURSE NAVIGATOR
IV removed. Discharge instructions reviewed. All questions addressed. Provided gel packs and dressing change supplies. Assisted into car for transport home with wife.

## 2022-01-19 NOTE — PROGRESS NOTES
Orthopaedics Daily Progress Note                            Date of Surgery:  1/18/2022      Patient: Cirilo Germain   YOB: 1945  Age: 68 y.o. SUBJECTIVE:   1 Day Post-Op following RIGHT TOTAL KNEE ARTHROPLASTY (BLOCK W/IV SEDATION). The patient's post operative pain is controlled. No CP/SOB. No N/V. The patient's mobility will be evaluated today during PT sessions. OBJECTIVE:     Vital Signs:      Visit Vitals  BP (!) 95/56 (BP 1 Location: Right upper arm, BP Patient Position: Lying)   Pulse 65   Temp 98.1 °F (36.7 °C)   Resp 16   Ht 6' 2\" (1.88 m)   Wt 160 lb (72.6 kg)   SpO2 93%   BMI 20.54 kg/m²       Physical Exam:  General: A&Ox3. The patient is cooperative, and in no acute distress. Respiratory: Respirations are unlabored. Surgical site(s): dressing clean, dry  Musculoskeletal: Calves are soft, supple, and non-tender upon palpation. Motor 5/5. Neurological:  Neurovascularly intact with good dorsi and plantar flexion. Pulses symmetrical.    Laboratory Values:             Recent Results (from the past 12 hour(s))   METABOLIC PANEL, BASIC    Collection Time: 01/19/22  3:27 AM   Result Value Ref Range    Sodium 141 136 - 145 mmol/L    Potassium 3.3 (L) 3.5 - 5.1 mmol/L    Chloride 113 (H) 97 - 108 mmol/L    CO2 24 21 - 32 mmol/L    Anion gap 4 (L) 5 - 15 mmol/L    Glucose 92 65 - 100 mg/dL    BUN 20 6 - 20 MG/DL    Creatinine 0.72 0.70 - 1.30 MG/DL    BUN/Creatinine ratio 28 (H) 12 - 20      GFR est AA >60 >60 ml/min/1.73m2    GFR est non-AA >60 >60 ml/min/1.73m2    Calcium 8.0 (L) 8.5 - 10.1 MG/DL   HEMOGLOBIN    Collection Time: 01/19/22  3:27 AM   Result Value Ref Range    HGB 11.0 (L) 12.1 - 17.0 g/dL         PLAN:     S/P RIGHT TOTAL KNEE ARTHROPLASTY (BLOCK W/IV SEDATION) -Continue WBAT. -Mobilize and continue with PT/OT until discharged     Hemodynamics Hgb today is 11. Acute blood loss anemia as expected. Patient asymptomatic. Continue to monitor.      Wound Monitor postop dressing; no postop dressing changes necessary. Reinforce PRN. Post Operative Pain Pain Control: stable, mild-to-moderate joint symptoms intermittently, reasonably well controlled by current meds. DVT Prophylaxis Continue with SCD'S, Ankle Pump Exercises. ASA 81mg BID     Discharge Disposition Discharge plan: Home with HHPT pending PT clearance.        Signed By: Lynette Hernandez PA-C  January 19, 2022 8:45 AM

## 2022-01-19 NOTE — PROGRESS NOTES
Problem: Mobility Impaired (Adult and Pediatric)  Goal: *Acute Goals and Plan of Care (Insert Text)  Description: FUNCTIONAL STATUS PRIOR TO ADMISSION: Patient was modified independent using a single point cane and right knee brace for functional mobility. Pt has been very active - playing Tennis 2 x wk until recently secondary to knee pain. HOME SUPPORT PRIOR TO ADMISSION: The patient lived with wife but did not require assist.    Physical Therapy Goals  Initiated 1/18/2022    1. Patient will move from supine to sit and sit to supine  and scoot up and down in bed with modified independence within 4 days. 2. Patient will perform sit to stand with modified independence within 4 days. 3. Patient will ambulate with modified independence for > 150 feet with the least restrictive device within 4 days. 4. Patient will ascend/descend 4 stairs with one handrail(s) with modified independence within 4 days. 5. Patient will perform TKR home exercise program per protocol with supervision/set-up within 4 days. Outcome: Progressing Towards Goal   PHYSICAL THERAPY TREATMENT  Patient: Sophia Ryan (76 y.o. male)  Date: 1/19/2022  Diagnosis: Osteoarthritis of right knee, unspecified osteoarthritis type [M17.11]  Primary localized osteoarthritis of right knee [M17.11] <principal problem not specified>  Procedure(s) (LRB):  RIGHT TOTAL KNEE ARTHROPLASTY (BLOCK W/IV SEDATION) (Right) 1 Day Post-Op  Precautions: Fall,WBAT  Chart, physical therapy assessment, plan of care and goals were reviewed. ASSESSMENT  Patient continues with skilled PT services and is progressing towards goals. Patient with reports of minimal pain and is anxious to DC home. Currently needing SBA for bed mobility and transfers. Amb approx 125 feet with RW and CGA with no overt LOB. Up/down 4 stairs with CGA and no overt difficulty noted. Patient is impulsive at times and needs cues for safety.   Patient is aware of HEP, use of ice and to amb 1x/hour. Patient is safe to DC home with [place] PT. Other factors to consider for discharge: none         PLAN :  Patient continues to benefit from skilled intervention to address the above impairments. Continue treatment per established plan of care. to address goals. Recommendation for discharge: (in order for the patient to meet his/her long term goals)  Physical therapy at least 2 days/week in the home         IF patient discharges home will need the following DME: patient owns DME required for discharge       SUBJECTIVE:   Patient stated I'm going to go home today. I am determined    OBJECTIVE DATA SUMMARY:   Critical Behavior:  Neurologic State: Alert  Orientation Level: Oriented X4  Cognition: Follows commands  Safety/Judgement: Awareness of environment,Good awareness of safety precautions        Functional Mobility Training:  Bed Mobility:     Supine to Sit: Stand-by assistance  Sit to Supine: Stand-by assistance  Scooting: Stand-by assistance        Transfers:  Sit to Stand: Stand-by assistance  Stand to Sit: Stand-by assistance                             Balance:  Sitting: Intact; Without support  Standing: Impaired  Standing - Static: Constant support;Good  Standing - Dynamic : Constant support;Good  Ambulation/Gait Training:  Distance (ft): 125 Feet (ft)  Assistive Device: Gait belt;Walker, rolling  Ambulation - Level of Assistance: Contact guard assistance        Gait Abnormalities: Antalgic;Decreased step clearance  Right Side Weight Bearing: As tolerated     Base of Support: Center of gravity altered;Shift to right  Stance: Right decreased  Speed/Asha: Pace decreased (<100 feet/min); Slow  Step Length: Left shortened;Right shortened      Therapeutic Exercises:     EXERCISE   Sets   Reps   Active Active Assist   Passive Self ROM   Comments   Ankle Pumps 1 10 []                                        []                                        []                                        [] Quad Sets   []                                        []                                        []                                        []                                           Hamstring Sets   []                                        []                                        []                                        []                                           Short Arc Quads   []                                        []                                        []                                        []                                           Knee Extension Stretch     []                                          []                                          []                                          []                                           Heel Slides 1 10 []                                        []                                        []                                        []                                           Long Arc Quads   []                                        []                                        []                                        []                                           Knee Flexion Stretch   []                                        []                                        []                                        []                                           Straight Leg Raises   []                                        []                                        []                                        []                                               Pain Rating:  Reports pain but does not rate    Activity Tolerance:   Good    After treatment patient left in no apparent distress:   Sitting in chair, Call bell within reach, and Caregiver / family present    COMMUNICATION/COLLABORATION:   The patients plan of care was discussed with: Physical therapist, Occupational therapist, and Registered nurse.      Larisa Hermosillo, PT, DPT   Time Calculation: 23 mins

## 2022-01-19 NOTE — PROGRESS NOTES
Bedside and Verbal shift change report given to Jalil Simms RN (oncoming nurse) by Manuel Herrmann RN (offgoing nurse). Report included the following information SBAR, Kardex, Procedure Summary, Intake/Output and MAR.

## 2022-01-19 NOTE — NURSE NAVIGATOR
[facility]  SBAR Orthopaedic Pathway Handoff     FROM:                                TO: At [facility]                                                      ([address] [facility] or Facility name)  Demi Nielson 55  [address] [address]  Dept: [address]: [phone]                                      Room#:  973/02                                                       Nurse Navigator:  Rachel Craven RN         SITUATION      ASAScore: ASA 2 - Patient with mild systemic disease with no functional limitations    Admitted:  1/18/2022  Hospital Day: 2      Attending Provider:  No att. providers found     Consultations:  None    PCP:  Leslie Shah MD   [phone]     Admitting Dx:  Osteoarthritis of right knee, unspecified osteoarthritis type [M17.11]  Primary localized osteoarthritis of right knee [M17.11]       Active Problems:    Primary localized osteoarthritis of right knee (1/18/2022)      1 Day Post-Op of   Procedure(s):  RIGHT TOTAL KNEE ARTHROPLASTY (BLOCK W/IV SEDATION)   BY: Doug Baumann MD             ON: 1/18/2022                  Code Status: Full Code             Advance Directive? Received (Send w/patient)     Isolation:  There are currently no Active Isolations       MDRO: No current active infections    BACKGROUND     Allergies:   Allergies   Allergen Reactions    Acetaminophen Other (comments)     UNABLE TO URINATE, CAUSES FLANK PAIN       Past Medical History:   Diagnosis Date    Arthritis     SHOULDER, R KNEE, R THUMB, LOWER BACK     Basal cell cancer     multiple    Chronic pain      AND L SHOULDER, LOWER BACK    Hyperlipidemia     Hypertension     Melanoma ([facility] [address]     multiple    Squamous cell carcinoma     multiple       Past Surgical History:   Procedure Laterality Date    COLONOSCOPY N/A 11/19/2021    COLONOSCOPY   :- performed [facility]ier [facility] at [facility] ENDOSCOPY    HX COLONOSCOPY  2006    HX COLONOSCOPY  07/25/2016    diverticulosis; otherwise normal    HX CYST REMOVAL Left 1961    FOOT    HX HERNIA REPAIR  2008    bilateral     HX KNEE REPLACEMENT Left 2010    HX KNEE REPLACEMENT Left 7/28/16    due to failed replacement    HX OTHER SURGICAL  03/06/2019    banding of hemorrhoids    HX SHOULDER REPLACEMENT Right 11/12/2020    HX TONSILLECTOMY      HX WISDOM TEETH EXTRACTION         Prior to Admission Medications   Prescriptions Last Dose Informant Patient Reported? Taking?   amoxicillin (AMOXIL) 500 mg capsule Unknown at Unknown time  Yes No   Sig: TAKE 4 CAPSULES BY MOUTH 1 HOUR PRIOR TO DENTAL APPT   Patient not taking: Reported on 12/16/2021   atorvastatin (LIPITOR) 10 mg tablet 1/18/2022 at 0730  No Yes   Sig: TAKE 1 TABLET BY MOUTH EVERY DAY   celecoxib (CELEBREX) 200 mg capsule 1/18/2022 at 0730  No Yes   Sig: TAKE ONE CAPSULE BY MOUTH DAILY   hydroCHLOROthiazide (HYDRODIURIL) 25 mg tablet 1/17/2022 at Unknown time  No Yes   Sig: TAKE 1 TABLET BY MOUTH EVERY DAY   multivitamin (ONE A DAY) tablet 1/10/2022  Yes No   Sig: Take 1 Tab by mouth daily. tamsulosin (FLOMAX) 0.4 mg capsule 1/17/2022 at Unknown time  Yes Yes   traMADoL (ULTRAM) 50 mg tablet Unknown at Unknown time  Yes No      Facility-Administered Medications: None       Vaccinations:    Immunization History   Administered Date(s) Administered    COVID-19, Pfizer Purple top, DILUTE for use, 12+ yrs, 30mcg/0.3mL dose 02/19/2021, 02/22/2021    Influenza Vaccine 08/31/2021    Influenza Vaccine (Tri) Adjuvanted (>65 Yrs FLUAD TRI 00323) 10/10/2018, 09/24/2019    Influenza, Quadrivalent, Adjuvanted (>65 Yrs FLUAD QUAD 62911) 09/15/2020    Pneumococcal Conjugate (PCV-13) 10/04/2017    Pneumococcal Polysaccharide (PPSV-23) 11/05/2014    Tdap 10/20/2017    Zoster Vaccine, Live 10/20/2017         ASSESSMENT   Age: 68 y.o.              Gender: male        Height: Height: 6' 2\" (188 cm)                    Weight:Weight: 72.6 kg (160 lb)     Patient Vitals for the past 8 hrs:   Temp Pulse Resp BP SpO2   01/19/22 0851 97.9 °F (36.6 °C) 83 16 129/75 97 %            Active Orders   Diet    ADULT DIET Regular       Orientation: Orientation Level: Oriented X4    Active Lines/Drains:  (Peg Tube / Morton / CL or S/L?):no    Urinary Status: Voiding      Last BM: Last Bowel Movement Date: 01/19/22     Skin Integrity: Incision (comment)             Mobility: Slightly limited   Weight Bearing Status: WBAT (Weight Bearing as Tolerated)      Gait Training  Assistive Device: Gait belt,Walker, rolling  Ambulation - Level of Assistance: Contact guard assistance  Distance (ft): 125 Feet (ft)     On Anticoagulation? YES  Aspirin                                         Pain Medications given:  Oxycodone                                   Lab Results   Component Value Date/Time    Glucose 92 01/19/2022 03:27 AM    Hemoglobin A1c 5.8 (H) 01/04/2022 04:10 PM    INR 1.1 01/04/2022 04:10 PM    INR 1.1 11/05/2020 03:46 PM    HGB 11.0 (L) 01/19/2022 03:27 AM    HGB 14.3 01/04/2022 04:10 PM    HGB 15.0 08/31/2021 10:16 AM    HGB 14.3 11/05/2020 03:46 PM       Readmission Risks:  Score:         RECOMMENDATION     See After Visit Summary (AVS) for:  · Discharge instructions  · After [address]   · Medication Reconciliation          [facility] Orthopaedic Nurse Navigator  FALGUNI Beaver, RN-BC       Office  [phone]  Cell      [phone]  Fax      [phone]  [email or web address]             . Amyy

## 2022-01-25 RX ORDER — CELECOXIB 200 MG/1
CAPSULE ORAL
Qty: 90 CAPSULE | Refills: 1 | Status: SHIPPED | OUTPATIENT
Start: 2022-01-25 | End: 2022-07-27

## 2022-02-07 ENCOUNTER — OFFICE VISIT (OUTPATIENT)
Dept: INTERNAL MEDICINE CLINIC | Age: 77
End: 2022-02-07
Payer: MEDICARE

## 2022-02-07 VITALS
SYSTOLIC BLOOD PRESSURE: 98 MMHG | OXYGEN SATURATION: 96 % | HEART RATE: 90 BPM | RESPIRATION RATE: 18 BRPM | DIASTOLIC BLOOD PRESSURE: 64 MMHG | TEMPERATURE: 97.1 F | WEIGHT: 157.4 LBS | BODY MASS INDEX: 20.2 KG/M2 | HEIGHT: 74 IN

## 2022-02-07 DIAGNOSIS — Z71.89 ADVANCED CARE PLANNING/COUNSELING DISCUSSION: ICD-10-CM

## 2022-02-07 DIAGNOSIS — I10 HYPERTENSION, ESSENTIAL: ICD-10-CM

## 2022-02-07 DIAGNOSIS — E87.6 HYPOKALEMIA: ICD-10-CM

## 2022-02-07 DIAGNOSIS — R35.1 BENIGN PROSTATIC HYPERPLASIA WITH NOCTURIA: ICD-10-CM

## 2022-02-07 DIAGNOSIS — D64.9 ANEMIA FOLLOWING SURGERY: ICD-10-CM

## 2022-02-07 DIAGNOSIS — Z00.00 MEDICARE ANNUAL WELLNESS VISIT, SUBSEQUENT: Primary | ICD-10-CM

## 2022-02-07 DIAGNOSIS — N40.1 BENIGN PROSTATIC HYPERPLASIA WITH NOCTURIA: ICD-10-CM

## 2022-02-07 DIAGNOSIS — E78.00 PURE HYPERCHOLESTEROLEMIA: ICD-10-CM

## 2022-02-07 DIAGNOSIS — R73.03 PRE-DIABETES: ICD-10-CM

## 2022-02-07 PROBLEM — M19.019 OA (OSTEOARTHRITIS) OF SHOULDER: Status: RESOLVED | Noted: 2020-11-12 | Resolved: 2022-02-07

## 2022-02-07 PROBLEM — Z96.651 STATUS POST RIGHT KNEE REPLACEMENT: Status: ACTIVE | Noted: 2022-01-18

## 2022-02-07 PROCEDURE — G8754 DIAS BP LESS 90: HCPCS | Performed by: INTERNAL MEDICINE

## 2022-02-07 PROCEDURE — G8752 SYS BP LESS 140: HCPCS | Performed by: INTERNAL MEDICINE

## 2022-02-07 PROCEDURE — G8420 CALC BMI NORM PARAMETERS: HCPCS | Performed by: INTERNAL MEDICINE

## 2022-02-07 PROCEDURE — G0463 HOSPITAL OUTPT CLINIC VISIT: HCPCS | Performed by: INTERNAL MEDICINE

## 2022-02-07 PROCEDURE — 1101F PT FALLS ASSESS-DOCD LE1/YR: CPT | Performed by: INTERNAL MEDICINE

## 2022-02-07 PROCEDURE — G8427 DOCREV CUR MEDS BY ELIG CLIN: HCPCS | Performed by: INTERNAL MEDICINE

## 2022-02-07 PROCEDURE — G8510 SCR DEP NEG, NO PLAN REQD: HCPCS | Performed by: INTERNAL MEDICINE

## 2022-02-07 PROCEDURE — G0439 PPPS, SUBSEQ VISIT: HCPCS | Performed by: INTERNAL MEDICINE

## 2022-02-07 PROCEDURE — 99214 OFFICE O/P EST MOD 30 MIN: CPT | Performed by: INTERNAL MEDICINE

## 2022-02-07 PROCEDURE — G8536 NO DOC ELDER MAL SCRN: HCPCS | Performed by: INTERNAL MEDICINE

## 2022-02-07 NOTE — PATIENT INSTRUCTIONS
Medicare Wellness Visit, Male    The best way to live healthy is to have a lifestyle where you eat a well-balanced diet, exercise regularly, limit alcohol use, and quit all forms of tobacco/nicotine, if applicable. Regular preventive services are another way to keep healthy. Preventive services (vaccines, screening tests, monitoring & exams) can help personalize your care plan, which helps you manage your own care. Screening tests can find health problems at the earliest stages, when they are easiest to treat. [facility] follows the current, evidence-based guidelines published by the [facility] ([facility]) when recommending preventive services for our patients. Because we follow these guidelines, sometimes recommendations change over time as research supports it. (For example, a prostate screening blood test is no longer routinely recommended for men with no symptoms). Of course, you and your doctor may decide to screen more often for some diseases, based on your risk and co-morbidities (chronic disease you are already diagnosed with). Preventive services for you include:  - Medicare offers their members a free annual wellness visit, which is time for you and your primary care provider to discuss and plan for your preventive service needs. Take advantage of this benefit every year!  -All adults over age 72 should receive the recommended pneumonia vaccines. Current USPSTF guidelines recommend a series of two vaccines for the best pneumonia protection.   -All adults should have a flu vaccine yearly and tetanus vaccine every 10 years.  -All adults age 48 and older should receive the shingles vaccines (series of two vaccines).        -All adults age 38-68 who are overweight should have a diabetes screening test once every three years.   -Other screening tests & preventive services for persons with diabetes include: an eye exam to screen for diabetic retinopathy, a kidney function test, a foot exam, and stricter control over your cholesterol.   -Cardiovascular screening for adults with routine risk involves an electrocardiogram (ECG) at intervals determined by the provider.   -Colorectal cancer screening should be done for adults age 54-65 with no increased risk factors for colorectal cancer. There are a number of acceptable methods of screening for this type of cancer. Each test has its own benefits and drawbacks. Discuss with your provider what is most appropriate for you during your annual wellness visit. The different tests include: colonoscopy (considered the best screening method), a fecal occult blood test, a fecal DNA test, and sigmoidoscopy.  -All adults born between [facility] should be screened once for Hepatitis C.  -An Abdominal Aortic Aneurysm (AAA) Screening is recommended for men age 73-68 who has ever smoked in their lifetime.      Here is a list of your current Health Maintenance items (your personalized list of preventive services) with a due date:  Health Maintenance Due   Topic Date Due    Shingles Vaccine (1 of 2) Never done

## 2022-02-07 NOTE — ASSESSMENT & PLAN NOTE
Stable, at baseline, no issues w/ retention after knee replacement, declined medications, will monitor

## 2022-02-07 NOTE — ACP (ADVANCE CARE PLANNING)
Advance Care Planning   Advance Care Planning (ACP) Physician/NP/PA (Provider) Conversation    Date of ACP Conversation: 02/07/22  Persons included in Conversation:  patient  Length of ACP Conversation in minutes: <16 minutes (Non-Billable)    Authorized Decision Maker (if patient is incapable of making informed decisions):   Named in Advance Directive or Healthcare Power of       Primary Decision Maker: Phyliss Lanes - [facility] - [phone]    He has an advanced directive - a copy has been provided & still reflects him wishes. Reviewed DNR/DNI and patient is not interested- elects Full Code (attempt resuscitation).        Diane Wellington MD

## 2022-02-07 NOTE — PROGRESS NOTES
Jesika Amin is a 68 y.o. male who was seen in clinic today (2/7/2022) for a full physical.          Assessment & Plan:   Below is the assessment and plan developed based on review of pertinent history, physical exam, labs, studies, and medications. 1. Medicare annual wellness visit, subsequent  2. Advanced care planning/counseling discussion  -     varicella-zoster recombinant, PF, (SHINGRIX) 50 mcg/0.5 mL susr injection; 0.5 mL IM once now and then repeat in 2-6 months, Normal, Disp-0.5 mL, R-1  3. Hypertension, essential  Assessment & Plan:  well controlled, continue current treatment pending review of labs    4. Pure hypercholesterolemia  Assessment & Plan:  at goal, continue current treatment    5. Pre-diabetes  Assessment & Plan:  Improved, no changes   6. Benign prostatic hyperplasia with nocturia  Assessment & Plan:  Stable, at baseline, no issues w/ retention after knee replacement, declined medications, will monitor   7. Anemia following surgery  Comments:  will repeat labs to verify improving  Orders:  -     CBC W/O DIFF; Future  8. Hypokalemia  Comments:  low after surgery, never been an issue in the past, will repeat   Orders:  -     METABOLIC PANEL, BASIC; Future    Follow-up and Dispositions    · Return in about 6 months (around 8/7/2022) for Regular follow up. Subjective: Lana Monroe is here today for a full physical.      Annual Wellness Visit- Subsequent Visit    Since last visit: s/p R TKR, he has f/u next week    The following acute and/or chronic problems were addressed today:    Cardiovascular Review  The patient has hypertension and hyperlipidemia. He reports taking medications as instructed, no medication side effects noted, patient does not perform home BP monitoring. He generally follows a low fat low cholesterol diet, generally follows a low sodium diet. End of Life Planning:  This was discussed with him today and he has an advanced directive - a copy has been provided. Reviewed DNR/DNI and patient is not interested. Depression Screen:  3 most recent PHQ Screens 2022   Little interest or pleasure in doing things Not at all   Feeling down, depressed, irritable, or hopeless Not at all   Total Score PHQ 2 0         Fall Risk:   Fall Risk Assessment, last 12 mths 2022   Able to walk? Yes   Fall in past 12 months? 0   Do you feel unsteady? 0   Are you worried about falling 0       Abuse Screen:  Abuse Screening Questionnaire 2022   Do you ever feel afraid of your partner? N   Are you in a relationship with someone who physically or mentally threatens you? N   Is it safe for you to go home? Y       Do you average more than 1 drink per night or more than 7 drinks a week: No    In the past three months have you have had more than 4 drinks containing alcohol on one occasion: No          Health Maintenance:   Daily Aspirin: yes  AAA Screenin19     Immunizations:   Covid: up to date   Influenza: up to date   Tetanus: up to date   Shingles: due for Shingrix - script provided   Pneumonia: up to date  Cancer screening:   Prostate: guidelines reviewed, declined  Colon: guidelines reviewed, up to date    Functional Ability and Level of Safety:    Hearing: Hearing is good. The patient wears hearing aids. Cognition Screen:   Has your family/caregiver stated any concerns about your memory: no  Cognitive Screening: Normal - Clock Drawing Test     Ambulation: with mild difficulty - s/p TKR     Activities of Daily Living: The home contains: grab bars  Patient does total self care  Exercise: very active    Adult Nutrition Screen:  No risk factors noted.        Patient Care Team:  Rod Gonsales MD as PCP - General (Internal Medicine)  Rod Gonsales MD as PCP - [facility] [place] [facility]  Lior Hayden DO (Dermatology)  Alyssa Steele MD (Orthopedic Surgery)  Kim Medina MD (Optometry)  Car Arreola MD (Orthopedic Surgery)  Sandee Marsh DEMETRIUS GARNER (Physician Assistant)  Robinson Mason, RN as Nurse Navigator (Orthopedic Surgery)       The following sections were reviewed & updated as appropriate: Problem List, Allergies, PMH, PSH, FH, and SH. Prior to Admission medications    Medication Sig Start Date End Date Taking? Authorizing Provider   celecoxib (CELEBREX) 200 mg capsule TAKE ONE CAPSULE BY MOUTH DAILY 1/25/22  Yes Christina Price MD   aspirin delayed-release 81 mg tablet Take 1 Tablet by mouth two (2) times a day. Indications: blood clot prevention 1/18/22  Yes Caroline Angeles PA-C   tamsulosin (FLOMAX) 0.4 mg capsule  11/23/21  Yes Provider, Historical   atorvastatin (LIPITOR) 10 mg tablet TAKE 1 TABLET BY MOUTH EVERY DAY 11/28/21  Yes Christina Price MD   hydroCHLOROthiazide (HYDRODIURIL) 25 mg tablet TAKE 1 TABLET BY MOUTH EVERY DAY 11/7/21  Yes Christina Price MD   multivitamin (ONE A DAY) tablet Take 1 Tab by mouth daily. Yes Provider, Historical   senna-docusate (PERICOLACE) 8.6-50 mg per tablet Take 1 Tablet by mouth two (2) times daily as needed for Constipation. 1/18/22 2/7/22  Caroline Angeles PA-C   amoxicillin (AMOXIL) 500 mg capsule TAKE 4 CAPSULES BY MOUTH 1 HOUR PRIOR TO DENTAL APPT  Patient not taking: Reported on 12/16/2021 8/1/19   Provider, Historical          Review of Systems   Constitutional: Negative for chills and fever. Respiratory: Negative for cough and shortness of breath. Cardiovascular: Negative for chest pain and palpitations. Gastrointestinal: Negative for abdominal pain, blood in stool, constipation, diarrhea, heartburn, nausea and vomiting. Genitourinary:        He reports: nocturia x 2-4, weak stream.     Musculoskeletal: Positive for joint pain (R knee is improving). Negative for myalgias. Neurological: Negative for tingling, focal weakness and headaches. Endo/Heme/Allergies: Does not bruise/bleed easily. Psychiatric/Behavioral: Negative for depression.  The patient is not nervous/anxious and does not have insomnia. Objective:   Physical Exam  Constitutional:       General: He is not in acute distress. Appearance: Normal appearance. Eyes:      Conjunctiva/sclera: Conjunctivae normal.   Cardiovascular:      Rate and Rhythm: Regular rhythm. Heart sounds: No murmur heard. Pulmonary:      Effort: Pulmonary effort is normal.      Breath sounds: Normal breath sounds. No decreased breath sounds or wheezing. Abdominal:      General: Bowel sounds are normal.      Palpations: Abdomen is soft. Tenderness: There is no abdominal tenderness. Musculoskeletal:      Right lower leg: No edema. Left lower leg: No edema.    Psychiatric:         Mood and Affect: Mood and affect normal.          Visit Vitals  BP 98/64 (BP 1 Location: Left upper arm, BP Patient Position: Sitting, BP Cuff Size: Adult)   Pulse 90   Temp 97.1 °F (36.2 °C) (Temporal)   Resp 18   Ht 6' 2\" (1.88 m)   Wt 157 lb 6.4 oz (71.4 kg)   SpO2 96%   BMI 20.21 kg/m²         Carmine Ramos MD

## 2022-02-08 LAB
BUN SERPL-MCNC: 27 MG/DL (ref 8–27)
BUN/CREAT SERPL: 32 (ref 10–24)
CALCIUM SERPL-MCNC: 9.2 MG/DL (ref 8.6–10.2)
CHLORIDE SERPL-SCNC: 99 MMOL/L (ref 96–106)
CO2 SERPL-SCNC: 30 MMOL/L (ref 20–29)
CREAT SERPL-MCNC: 0.85 MG/DL (ref 0.76–1.27)
ERYTHROCYTE [DISTWIDTH] IN BLOOD BY AUTOMATED COUNT: 13.5 % (ref 11.6–15.4)
GLUCOSE SERPL-MCNC: 105 MG/DL (ref 65–99)
HCT VFR BLD AUTO: 37 % (ref 37.5–51)
HGB BLD-MCNC: 12.4 G/DL (ref 13–17.7)
MCH RBC QN AUTO: 31.3 PG (ref 26.6–33)
MCHC RBC AUTO-ENTMCNC: 33.5 G/DL (ref 31.5–35.7)
MCV RBC AUTO: 93 FL (ref 79–97)
PLATELET # BLD AUTO: 375 X10E3/UL (ref 150–450)
POTASSIUM SERPL-SCNC: 3.7 MMOL/L (ref 3.5–5.2)
RBC # BLD AUTO: 3.96 X10E6/UL (ref 4.14–5.8)
SODIUM SERPL-SCNC: 140 MMOL/L (ref 134–144)
WBC # BLD AUTO: 9.3 X10E3/UL (ref 3.4–10.8)

## 2022-02-08 NOTE — PROGRESS NOTES
Results released to patient via [employer]. Previously low K and HGB are improving. Labs were non-fasting.

## 2022-02-10 ENCOUNTER — OFFICE VISIT (OUTPATIENT)
Dept: ORTHOPEDIC SURGERY | Age: 77
End: 2022-02-10
Payer: MEDICARE

## 2022-02-10 VITALS — BODY MASS INDEX: 19.89 KG/M2 | HEIGHT: 74 IN | WEIGHT: 155 LBS

## 2022-02-10 DIAGNOSIS — Z96.651 STATUS POST RIGHT KNEE REPLACEMENT: Primary | ICD-10-CM

## 2022-02-10 DIAGNOSIS — Z09 SURGERY FOLLOW-UP: ICD-10-CM

## 2022-02-10 PROCEDURE — 99024 POSTOP FOLLOW-UP VISIT: CPT | Performed by: PHYSICIAN ASSISTANT

## 2022-02-10 NOTE — PROGRESS NOTES
Souleymane Shaver (: 1945) is a 68 y.o. male, patient, here for evaluation of the following chief complaint(s):  Surgical Follow-up (RTK #1 F/U)       SUBJECTIVE/OBJECTIVE:  Souleymane Shaver presents today for their first postop visit status post right TKA on 22. He is ready for outpatient physical therapy. Not taking anything for pain. On preop Celebrex 30. Still taking aspirin twice a day for DVT prophylaxis as instructed. PHYSICAL EXAM:  Vitals: Ht 6' 2\" (1.88 m)   Wt 155 lb (70.3 kg)   BMI 19.90 kg/m²   Body mass index is 19.9 kg/m². 68y.o. year old M in no acute distress. Ambulates with a slight limp on the right, bent knee gait pattern, cane in the contralateral hand. Right knee neutral alignment. Mild to moderate residual effusion. Well healing incision with eschar at the proximal and distal end. No warmth, erythema, drainage. Range of motion lacks 5 to 8 degrees of extension with flexion to 110/115. Preop [id number]. Motor 5/5. No distal edema. IMAGING:  Radiographs: XR Results (most recent):  Results from Appointment encounter on 02/10/22    XR KNEE RT 3 V    Narrative  3 views of the right knee today including AP, lateral and sunrise using digital radiography demonstrate satisfactory position and alignment of cemented cruciate substituting components. No evidence of loosening. Patella with slight tilt on sunrise. ASSESSMENT/PLAN:  1. Status post right knee replacement  -     XR KNEE RT 3 V; Future  -     REFERRAL TO PHYSICAL THERAPY  2. Surgery follow-up    First postop visit status post right total knee arthroplasty on 2022. Transition outpatient physical therapy. We discussed the importance of range of motion exercises, especially working on terminal extension between now and his next visit. Once aspirin prescription is done, he can stop. Continue Celebrex and Tylenol as needed. Follow-up in 4 weeks for clinical recheck, sooner if needed.    Return in about 4 weeks (around 3/10/2022) for POV #2 with  [facility]. Review Of Systems  ROS     Positive for: Skin, Musculoskeletal    Last edited by Carmelina Landis RN on 2/10/2022  2:36 PM. (History)         Patient denies any recent fever, chills, nausea, vomiting, chest pain, or shortness of breath. Allergies   Allergen Reactions    Acetaminophen Other (comments)     UNABLE TO URINATE, CAUSES FLANK PAIN       Current Outpatient Medications   Medication Sig    varicella-zoster recombinant, PF, (SHINGRIX) 50 mcg/0.5 mL susr injection 0.5 mL IM once now and then repeat in 2-6 months    celecoxib (CELEBREX) 200 mg capsule TAKE ONE CAPSULE BY MOUTH DAILY    aspirin delayed-release 81 mg tablet Take 1 Tablet by mouth two (2) times a day. Indications: blood clot prevention    tamsulosin (FLOMAX) 0.4 mg capsule     atorvastatin (LIPITOR) 10 mg tablet TAKE 1 TABLET BY MOUTH EVERY DAY    hydroCHLOROthiazide (HYDRODIURIL) 25 mg tablet TAKE 1 TABLET BY MOUTH EVERY DAY    amoxicillin (AMOXIL) 500 mg capsule TAKE 4 CAPSULES BY MOUTH 1 HOUR PRIOR TO DENTAL APPT (Patient not taking: Reported on 12/16/2021)    multivitamin (ONE A DAY) tablet Take 1 Tab by mouth daily. No current facility-administered medications for this visit.        Past Medical History:   Diagnosis Date    Arthritis     SHOULDER, R KNEE, R THUMB, LOWER BACK     Basal cell cancer     multiple    Chronic pain      AND L SHOULDER, LOWER BACK    Hyperlipidemia     Hypertension     Melanoma ([facility] [address]     multiple    Squamous cell carcinoma     multiple        Past Surgical History:   Procedure Laterality Date    COLONOSCOPY N/A 11/19/2021    Tubular adenoma - performed by Arcelia Lawson MD at [facility] ENDOSCOPY    HX COLONOSCOPY  2006    HX COLONOSCOPY  07/25/2016    diverticulosis; otherwise normal    HX CYST REMOVAL Left 1961    FOOT    HX HERNIA REPAIR  2008    bilateral     HX KNEE REPLACEMENT Left 2010    HX KNEE REPLACEMENT Left 7/28/16 due to failed replacement    HX KNEE REPLACEMENT Right 2022    HX OTHER SURGICAL  2019    banding of hemorrhoids    HX OTHER SURGICAL      total knee replacement     HX SHOULDER REPLACEMENT Right 2020    HX TONSILLECTOMY      HX WISDOM TEETH EXTRACTION         Family History   Problem Relation Age of Onset    Stroke Mother     Cancer Mother         breast    Heart Failure Father     No Known Problems Daughter     Anesth Problems Neg Hx         Social History     Socioeconomic History    Marital status:      Spouse name: Not on file    Number of children: Not on file    Years of education: Not on file    Highest education level: Not on file   Occupational History    Not on file   Tobacco Use    Smoking status: Former Smoker     Packs/day: 2.00     Years: 15.00     Pack years: 30.00     Types: Cigarettes     Quit date: 1970     Years since quittin.7    Smokeless tobacco: Never Used   Vaping Use    Vaping Use: Never used   Substance and Sexual Activity    Alcohol use: Yes     Alcohol/week: 5.0 standard drinks     Types: 5 Cans of beer per week    Drug use: Yes     Types: Marijuana    Sexual activity: Not Currently     Partners: Female     Birth control/protection: Surgical, Abstinence   Other Topics Concern    Not on file   Social History Narrative    Not on file     Social Determinants of Health     Financial Resource Strain:     Difficulty of Paying Living Expenses: Not on file   Food Insecurity:     Worried About Running Out of Food in the Last Year: Not on file    Francie of Food in the Last Year: Not on file   Transportation Needs:     Lack of Transportation (Medical): Not on file    Lack of Transportation (Non-Medical):  Not on file   Physical Activity:     Days of Exercise per Week: Not on file    Minutes of Exercise per Session: Not on file   Stress:     Feeling of Stress : Not on file   Social Connections:     Frequency of Communication with Friends and Family: Not on file    Frequency of Social Gatherings with Friends and Family: Not on file    Attends Sabianism Services: Not on file    Active Member of Clubs or Organizations: Not on file    Attends Club or Organization Meetings: Not on file    Marital Status: Not on file   Intimate Partner Violence:     Fear of Current or Ex-Partner: Not on file    Emotionally Abused: Not on file    Physically Abused: Not on file    Sexually Abused: Not on file   Housing Stability:     Unable to Pay for Housing in the Last Year: Not on file    Number of [place] in the Last Year: Not on file    Unstable Housing in the Last Year: Not on file         Orders Placed This Encounter    XR KNEE RT 3 V     b3     Standing Status:   Future     Number of Occurrences:   1     Standing Expiration Date:   2/11/2023    REFERRAL TO PHYSICAL THERAPY     Referral Priority:   Routine     Referral Type:   PT/OT/ST     Referral Reason:   Specialty Services Required     Number of Visits Requested:   3231 Nahid Chaves M.D. was available for immediate consultation as the supervising physician. An electronic signature was used to authenticate this note.   -- Joseluis Carrion PA-C

## 2022-02-16 ENCOUNTER — OFFICE VISIT (OUTPATIENT)
Dept: ORTHOPEDIC SURGERY | Age: 77
End: 2022-02-16
Payer: MEDICARE

## 2022-02-16 DIAGNOSIS — Z96.651 STATUS POST RIGHT KNEE REPLACEMENT: Primary | ICD-10-CM

## 2022-02-16 DIAGNOSIS — R26.2 DIFFICULTY WALKING: ICD-10-CM

## 2022-02-16 PROCEDURE — 97140 MANUAL THERAPY 1/> REGIONS: CPT | Performed by: PHYSICAL THERAPIST

## 2022-02-16 PROCEDURE — 97162 PT EVAL MOD COMPLEX 30 MIN: CPT | Performed by: PHYSICAL THERAPIST

## 2022-02-16 PROCEDURE — 97112 NEUROMUSCULAR REEDUCATION: CPT | Performed by: PHYSICAL THERAPIST

## 2022-02-16 NOTE — PROGRESS NOTES
Patient Initial Evaluation    Patient Name: Freddy Mobley  Date:2022  : 1945  [x]  Patient  Verified  Payor: [place] MEDICARE / Plan: [place] MEDICARE PART A & B / Product Type: Medicare /    Total Treatment Time (min): 60    Treatment Area: Right knee    HPI    The patient is a 79year-old male referred to physical therapy by Dr. Bebeto Prather with a diagnosis of status post right knee arthroplasty completed 2022. Patient states that he has been doing well overall since the surgery. He is well known to our clinic and was treated previously for right shoulder arthroplasty. The patient states that he had home health physical therapy for about a month. He states that he is able to bend his knee well but continues to have difficulty straightening his knee. He is currently ascending and descending stairs one at a time. He states that he has minimal pain. His primary goal is to return to playing tennis. OBJECTIVE    Gait: Antalgic gait pattern with decreased right knee terminal knee extension at terminal stance. Range of motion: Left LE WNL, right knee active flexion to 117 degrees in supine, passive to 120 degrees, -20 degrees active knee extension, -15 degrees passive knee extension    Strength: LLE grossly 5/5, RLE grossly 5/5 except right knee extension 4/5    Pain: Reported a visual analog scale 2/10 at rest    Swelling: Mild    Incision: Healing well, no erythema or warmth noted proximal to incision    Palpation: Tenderness with palpation to medial joint line    Joint mobility assessment: hypomobile anterior tibiofemoral glide, hypomobile patellar glides    Lower Extremity Functional Scale: 48/80    Special tests: Not indicated    Treatment: Full evaluation of the patient was completed.  The patient presents with decreased R knee extension ROM, antalgic gait pattern with decreased terminal knee extension at terminal stance, decreased R knee extension strength, patellofemoral and tibiofemoral hypomobility, and decreased functional mobility. Patient would benefit from skilled therapy to address these limitations. Treatment consisted of retrograde massage to peripatellar structures for decreased joint swelling. Passive flexion/ extension and tibiofemoral and patellofemoral joint mobilizations. VMS burst stimulation 10/10 for 10 minutes in conjunction with quad sets. We discussed at length progressive plan of care and goals with the patient to develop current plan. We initiated ROM and functional strengthening exercises focusing on knee extension and active quad contraction. Home exercise program included seated calf stretch, knee extension prop with 3 lbs, supine hamstring stretch, and TKE with green T band. Written and pictorial exercises were provided to the patient. We recommended utilization of ice and elevation for pain relief at home. Total treatment time 60 minutes. ASSESSMENT    Long-term goal 4 weeks  1. Patient will ambulate with nonantalgic gait pattern with equal step lengths and demonstrate right terminal knee extension at terminal stance of gait. 2.  R knee extension strength 5/5.  3.  Patient will ascend and descend a flight of stairs with a reciprocal gait pattern. 4.  R knee extension ROM 0 degrees. 5.  15 point improvement in lower extremity functional scale. Short-term goal 1 weeks. 1.  Independent demonstration of a home exercise program to facilitate recovery. Total treatment time today 60 min. ICD-10-CM ICD-9-CM    1. Status post right knee replacement  Z96.651 V43.65    2. Difficulty walking  R26.2 719.7      PLAN OF CARE:    Treatment frequency 2X weekly. Duration 20 visits. Focus of therapy will be on progressive restoration of range of motion and strength, balance, and functional mobility. Therapeutic applications will include but are not limited to:  Home exercise program development and implementation with updating as needed.   Intramuscular dry needling to the involved region. Manual therapy, joint mobilization, myofascial release, therapeutic exercises. Modalities including ultrasound and electric stimulation heat and ice. Kinesiotape and Kaplan taping for joint reeducation and approximation of tissue for neuromuscular reeducation.

## 2022-02-23 ENCOUNTER — OFFICE VISIT (OUTPATIENT)
Dept: ORTHOPEDIC SURGERY | Age: 77
End: 2022-02-23
Payer: MEDICARE

## 2022-02-23 DIAGNOSIS — Z96.651 STATUS POST RIGHT KNEE REPLACEMENT: Primary | ICD-10-CM

## 2022-02-23 DIAGNOSIS — R26.2 DIFFICULTY WALKING: ICD-10-CM

## 2022-02-23 PROCEDURE — G0283 ELEC STIM OTHER THAN WOUND: HCPCS | Performed by: PHYSICAL THERAPIST

## 2022-02-23 PROCEDURE — 97110 THERAPEUTIC EXERCISES: CPT | Performed by: PHYSICAL THERAPIST

## 2022-02-23 PROCEDURE — 97140 MANUAL THERAPY 1/> REGIONS: CPT | Performed by: PHYSICAL THERAPIST

## 2022-02-23 NOTE — PROGRESS NOTES
PT DAILY TREATMENT NOTE    Patient Name: Ryley Choi  Date:2022  : 1945  [x]  Patient  Verified  Payor: Meir Muller / Plan: [place] MEDICARE PART A & B / Product Type: Medicare /    Total Treatment Time (min): 60  Total Treatment Time 1:1 (min): 45    Treatment Area: R knee    SUBJECTIVE  Subjective functional status/changes:   [] No changes reported  Patient states that he is having a hard time with knee extension exercise at home. OBJECTIVE  Modality (rationale):   [x]  Neuromuscular Stimulation: type VMS burst in conjunction with QS x 10 min     []att   []unatt   []w/US   []w/ice   []w/heat  []  Traction: []cerv   []pelvic   _ lbs x _ min     []pro   []sup   []int   []const  []  Ultrasound: []cont   []pulse    _ W/cm2 x _  min   []1MHz   []3MHz  []  Iontophoresis: []take home patch w/ dexamethazone    []40mA   []80mA                               []_ mA min w/: []dexamethazone   []other:_  []  Ice pack _  min     [] Hot pack _  min     [] Paraffin _  min  []  Other:     Therapeutic Exercise: (minutes: 1:1 30 minutes)      PT Exercise Log        Activity/Exercise Date  22    Activity/Exercise      Slant board   X      TKE  X     Balance board   X       Leg press90 lbs X                                                  Manual Therapy: (1:1 minutes: 20)     Treatment consisted of grade 4 patellofemoral mobilizations. Grade 4 anterior tibiofemoral mobilization with knee in extension. Scar mobilization. STM to distal quad. STM to distal HS in prone. Manual knee extension stretch in prone. ASSESSMENT  []  See Plan of Care  []  See progress note/recertification  [x]  Patient will continue to benefit from skilled therapy to address remaining functional deficits: antalgic gait pattern and stair mobility. Patient responded well to all exercises today. He continues to lack 20 degrees knee extension.   Continue plan with focus on restoring full knee extension mobility and normalizing gait pattern. ICD-10-CM ICD-9-CM    1. Status post right knee replacement  Z96.651 V43.65    2.  Difficulty walking  R26.2 719.7      Progress towards goals / Updated goals:    PLAN  []  Upgrade activities as tolerated     [x]  Continue plan of care  []  Discharge due to:_  [] Other:_      Angelito Garcia, PT 2/23/2022  2:43 PM

## 2022-02-25 ENCOUNTER — OFFICE VISIT (OUTPATIENT)
Dept: ORTHOPEDIC SURGERY | Age: 77
End: 2022-02-25
Payer: MEDICARE

## 2022-02-25 DIAGNOSIS — Z96.651 STATUS POST RIGHT KNEE REPLACEMENT: Primary | ICD-10-CM

## 2022-02-25 DIAGNOSIS — R26.2 DIFFICULTY WALKING: ICD-10-CM

## 2022-02-25 PROCEDURE — 97112 NEUROMUSCULAR REEDUCATION: CPT | Performed by: PHYSICAL THERAPIST

## 2022-02-25 PROCEDURE — 97110 THERAPEUTIC EXERCISES: CPT | Performed by: PHYSICAL THERAPIST

## 2022-02-25 PROCEDURE — 97140 MANUAL THERAPY 1/> REGIONS: CPT | Performed by: PHYSICAL THERAPIST

## 2022-02-25 NOTE — PROGRESS NOTES
PT DAILY TREATMENT NOTE    Patient Name: Rich Kelley  Date:2022  : 1945  [x]  Patient  Verified  Payor: Evonne Gardiner / Plan: [place] MEDICARE PART A & B / Product Type: Medicare /    Total Treatment Time 1:1 (min): 45    Treatment Area: R knee    SUBJECTIVE  Subjective functional status/changes:   [] No changes reported  Patient states that he is doing better with knee extension exercise at home. OBJECTIVE  Modality (rationale):   [x]  E-Stim: type VMS burst in conjunction with QS x 10 min     []att   []unatt   []w/US   []w/ice   []w/heat  []  Traction: []cerv   []pelvic   _ lbs x _ min     []pro   []sup   []int   []const  []  Ultrasound: []cont   []pulse    _ W/cm2 x _  min   []1MHz   []3MHz  []  Iontophoresis: []take home patch w/ dexamethazone    []40mA   []80mA                               []_ mA min w/: []dexamethazone   []other:_  []  Ice pack _  min     [] Hot pack _  min     [] Paraffin _  min  []  Other:     Therapeutic Exercise:       PT Exercise Log        Activity/Exercise Date  22    Activity/Exercise      Slant board   X      TKE  X     Balance board   X       Leg press90 lbs X                                              (minutes: 40)    Manual Therapy: Treatment consisted of grade 4 patellofemoral mobilizations. Grade 4 anterior tibiofemoral mobilization with knee in extension. Scar mobilization. STM to distal quad. STM to distal HS in prone. Manual knee extension stretch in prone.  (minutes: 20)    ASSESSMENT  []  See Plan of Care  []  See progress note/recertification  [x]  Patient will continue to benefit from skilled therapy to address remaining functional deficits: antalgic gait pattern and stair mobility. Patient is making gradual progress with knee extension mobility. He was able to demonstrate -15 degrees knee extension following treatment. ICD-10-CM ICD-9-CM    1. Status post right knee replacement  Z96.651 V43.65    2.  Difficulty walking  R26.2 719.7 Progress towards goals / Updated goals:    PLAN  []  Upgrade activities as tolerated     [x]  Continue plan of care  []  Discharge due to:_  [] Other:_      Karey Dixon, PT 2/25/2022  2:43 PM

## 2022-03-02 ENCOUNTER — OFFICE VISIT (OUTPATIENT)
Dept: ORTHOPEDIC SURGERY | Age: 77
End: 2022-03-02
Payer: MEDICARE

## 2022-03-02 DIAGNOSIS — R26.2 DIFFICULTY WALKING: ICD-10-CM

## 2022-03-02 DIAGNOSIS — Z96.651 STATUS POST RIGHT KNEE REPLACEMENT: Primary | ICD-10-CM

## 2022-03-02 PROCEDURE — 97140 MANUAL THERAPY 1/> REGIONS: CPT | Performed by: PHYSICAL THERAPIST

## 2022-03-02 PROCEDURE — 97112 NEUROMUSCULAR REEDUCATION: CPT | Performed by: PHYSICAL THERAPIST

## 2022-03-02 PROCEDURE — 97110 THERAPEUTIC EXERCISES: CPT | Performed by: PHYSICAL THERAPIST

## 2022-03-02 NOTE — PROGRESS NOTES
PT DAILY TREATMENT NOTE    Patient Name: Arminda Blackburn  Date:3/2/2022  : 1945  [x]  Patient  Verified  Payor: [employer] / Plan: [place] MEDICARE PART A & B / Product Type: Medicare /    Total Treatment Time 1:1 (min): 45    Treatment Area: R knee    SUBJECTIVE  Subjective functional status/changes:   [] No changes reported  Patient states that he is doing better with knee extension exercise at home. OBJECTIVE    Therapeutic Exercise: (minutes: 30, 1:1 15 min.)      PT Exercise Log        Activity/Exercise Date  22    Activity/Exercise      Slant board   X      TKE  X     Balance board   X       Leg press90 lbs X   Bike 10 min. X   LAQ with 3lbs   X                                      Manual Therapy: (minutes: 20)    Treatment consisted of grade 4 patellofemoral mobilizations. Grade 4 anterior tibiofemoral mobilization with knee in extension. Scar mobilization. STM to distal quad. STM to distal HS in prone. Manual knee extension stretch in prone. Neuromuscular stimulation: (10 minutes) VMS burst in conjunction with active strengthening and tactile stimulation to the [facility].     ASSESSMENT  []  See Plan of Care  []  See progress note/recertification  [x]  Patient will continue to benefit from skilled therapy to address remaining functional deficits: antalgic gait pattern and stair mobility. Patient is making gradual progress with knee extension mobility. He was able to demonstrate -15 degrees knee extension following treatment. ICD-10-CM ICD-9-CM    1. Status post right knee replacement  Z96.651 V43.65    2.  Difficulty walking  R26.2 719.7      Progress towards goals / Updated goals:    PLAN  []  Upgrade activities as tolerated     [x]  Continue plan of care  []  Discharge due to:_  [] Other:_      Elysia Tesfaye, PT 3/2/2022  2:43 PM

## 2022-03-04 ENCOUNTER — OFFICE VISIT (OUTPATIENT)
Dept: ORTHOPEDIC SURGERY | Age: 77
End: 2022-03-04
Payer: MEDICARE

## 2022-03-04 DIAGNOSIS — Z96.651 STATUS POST RIGHT KNEE REPLACEMENT: Primary | ICD-10-CM

## 2022-03-04 DIAGNOSIS — R26.2 DIFFICULTY WALKING: ICD-10-CM

## 2022-03-04 PROCEDURE — 97140 MANUAL THERAPY 1/> REGIONS: CPT | Performed by: PHYSICAL THERAPIST

## 2022-03-04 PROCEDURE — 97110 THERAPEUTIC EXERCISES: CPT | Performed by: PHYSICAL THERAPIST

## 2022-03-04 PROCEDURE — 97112 NEUROMUSCULAR REEDUCATION: CPT | Performed by: PHYSICAL THERAPIST

## 2022-03-04 NOTE — PROGRESS NOTES
PT DAILY TREATMENT NOTE    Patient Name: Drea Bangura  Date:3/4/2022  : 1945  [x]  Patient  Verified  Payor: VA MEDICARE / Plan: VA MEDICARE PART A & B / Product Type: Medicare /    Total Treatment Time (min): 60  Total Treatment Time 1:1 (min): 45    Treatment Area: R knee    SUBJECTIVE  Subjective functional status/changes:   [] No changes reported  Patient states that he is doing better with knee extension exercise at home. OBJECTIVE    Therapeutic Exercise: (minutes: 30, 1:1 15 min.)    PT Exercise Log        Activity/Exercise Date  22    Activity/Exercise      Slantboard   X      TKE  X     Balance board   X       Leg press90 lbs X   Bike 10 min. X   LAQ with 3lbs   X   Nustep 10 min. X                                Manual Therapy: (minutes: 20)    Treatment consisted of grade 4 patellofemoral mobilizations. Grade 4 anterior tibiofemoral mobilization with knee in extension. Scar mobilization. STM to distal quad. STM to distal HS in prone. Manual knee extension stretch in prone. Neuromuscular stimulation: (10 minutes) VMS burst in conjunction with active strengthening and tactile stimulation to the [facility] [address] QS.     ASSESSMENT  []  See Plan of Care  []  See progress note/recertification  [x]  Patient will continue to benefit from skilled therapy to address remaining functional deficits: antalgic gait pattern and stair mobility. Patient is making gradual progress with knee extension mobility. He was able to demonstrate -15 degrees knee extension following treatment. Patient recommended to continue to focus on knee extension mobility exercises several times daily. ICD-10-CM ICD-9-CM    1. Status post right knee replacement  Z96.651 V43.65    2.  Difficulty walking  R26.2 719.7      Progress towards goals / Updated goals:    PLAN  []  Upgrade activities as tolerated     [x]  Continue plan of care  []  Discharge due to:_  [] Other:_      Angelita Ferrell, PT 3/4/2022  2:43 PM

## 2022-03-09 ENCOUNTER — OFFICE VISIT (OUTPATIENT)
Dept: ORTHOPEDIC SURGERY | Age: 77
End: 2022-03-09
Payer: MEDICARE

## 2022-03-09 DIAGNOSIS — R26.2 DIFFICULTY WALKING: ICD-10-CM

## 2022-03-09 DIAGNOSIS — Z96.651 STATUS POST RIGHT KNEE REPLACEMENT: Primary | ICD-10-CM

## 2022-03-09 PROCEDURE — 97110 THERAPEUTIC EXERCISES: CPT | Performed by: PHYSICAL THERAPIST

## 2022-03-09 PROCEDURE — 97140 MANUAL THERAPY 1/> REGIONS: CPT | Performed by: PHYSICAL THERAPIST

## 2022-03-09 NOTE — PROGRESS NOTES
PT DAILY TREATMENT NOTE    Patient Name: Samaria Riggins  Date:3/9/2022  : 1945  [x]  Patient  Verified  Payor: VA MEDICARE / Plan: VA MEDICARE PART A & B / Product Type: Medicare /    Total Treatment Time (min): 60  Total Treatment Time 1:1 (min): 45    Treatment Area: R knee    SUBJECTIVE  Subjective functional status/changes:   [] No changes reported  Patient states that he is frustrated with progress and continues to have a lot of difficulty straightening his knee. OBJECTIVE    Therapeutic Exercise: (minutes: 30)    PT Exercise Log        Activity/Exercise Date  22    Activity/Exercise      Slantboard   X      TKE  X     Balance board   X       Leg press90 lbs X   Bike 10 min. X   LAQ with 3lbs   X   Nustep 10 min. X                          Manual Therapy: (minutes: 20)    Treatment consisted of grade 4 patellofemoral mobilizations. Grade 4 anterior tibiofemoral mobilization with knee in extension. Scar mobilization. STM to distal quad. STM to distal HS in prone. Manual knee extension stretch in prone. Neuromuscular stimulation: (10 minutes) VMS burst in conjunction with active strengthening and tactile stimulation to the [facility] [address] QS.     ASSESSMENT  []  See Plan of Care  []  See progress note/recertification  [x]  Patient will continue to benefit from skilled therapy to address remaining functional deficits: antalgic gait pattern and stair mobility. Patient is making gradual progress with knee extension mobility. He was able to demonstrate -15 degrees knee extension following treatment. Continues to ambulate with antalgic gait pattern secondary to terminal extension loss. Continue plan with focus on restoring full knee extension mobility. ICD-10-CM ICD-9-CM    1. Status post right knee replacement  Z96.651 V43.65    2.  Difficulty walking  R26.2 719.7      Progress towards goals / Updated goals:    PLAN  []  Upgrade activities as tolerated     [x]  Continue plan of care  []  Discharge due to:_  [] Other:_      Rosemarie Thomas, PT 3/9/2022  2:43 PM

## 2022-03-10 ENCOUNTER — OFFICE VISIT (OUTPATIENT)
Dept: ORTHOPEDIC SURGERY | Age: 77
End: 2022-03-10
Payer: MEDICARE

## 2022-03-10 VITALS — HEIGHT: 74 IN | WEIGHT: 155 LBS | BODY MASS INDEX: 19.89 KG/M2

## 2022-03-10 DIAGNOSIS — Z96.651 STATUS POST RIGHT KNEE REPLACEMENT: Primary | ICD-10-CM

## 2022-03-10 DIAGNOSIS — Z09 SURGERY FOLLOW-UP: ICD-10-CM

## 2022-03-10 PROCEDURE — 99024 POSTOP FOLLOW-UP VISIT: CPT | Performed by: ORTHOPAEDIC SURGERY

## 2022-03-10 NOTE — LETTER
3/10/2022    Patient: Sophia Ryan   YOB: 1945   Date of Visit: 3/10/2022     Daniel Jeff, [address] [facility]  [address] [place] [address] [phone]  [place] [place]    Dear Daniel Jeff MD,      Thank you for referring Mr. Gian Keller to [facility] for evaluation. My notes for this consultation are attached. If you have questions, please do not hesitate to call me. I look forward to following your patient along with you.       Sincerely,    Darryl Hale MD

## 2022-03-10 NOTE — PROGRESS NOTES
Nataliya Mock (: 1945) is a 68 y.o. male, patient, here for evaluation of the following chief complaint(s):  Surgical Follow-up (RTK # 2 F/U)       SUBJECTIVE/OBJECTIVE:  Nataliya Mock presents today for routine follow-up approximately 7 weeks out from right primary total knee replacement. Overall he is very happy with his progress. He continues with outpatient therapy. He is anxious to return to the tennis court, but promises he will be slow and cautious. He relates some mild discomfort with therapy and activities, but no significant pain. He is taking Celebrex. PHYSICAL EXAM:  Vitals: Ht 6' 2\" (1.88 m)   Wt 155 lb (70.3 kg)   BMI 19.90 kg/m²   Body mass index is 19.9 kg/m². 68y.o. year old M, no distress. Ambulates without a limp. Right anterior knee incision is well-healed. Mild residual local knee swelling and warmth. He lacks about 5 degrees of extension, flexion to approximately 120 degrees. Preoperative motion 0 to 120 degrees. Hamstrings are quite tight. No distal edema. No calf tenderness. IMAGING:  Radiographs: No new images today. ASSESSMENT/PLAN:  1. Status post right knee replacement  2. Surgery follow-up    Satisfactory progress. We reviewed a few additional exercises to work on at home in between therapy sessions, focusing on terminal knee extension. He will add aggressive hamstring stretching to his regimen. May gradually return to hitting tennis balls around. Return in 1 year for routine x-ray follow-up of right total knee, sooner if needed. Return for routine 1 year postop visit. Review Of Systems  ROS     Positive for: Musculoskeletal    Last edited by Gerber Ferguson RN on 3/10/2022  2:45 PM. (History)         Patient denies any recent fever, chills, nausea, vomiting, chest pain, or shortness of breath.     Allergies   Allergen Reactions    Acetaminophen Other (comments)     UNABLE TO URINATE, CAUSES FLANK PAIN       Current Outpatient Medications   Medication Sig    celecoxib (CELEBREX) 200 mg capsule TAKE ONE CAPSULE BY MOUTH DAILY    tamsulosin (FLOMAX) 0.4 mg capsule     atorvastatin (LIPITOR) 10 mg tablet TAKE 1 TABLET BY MOUTH EVERY DAY    hydroCHLOROthiazide (HYDRODIURIL) 25 mg tablet TAKE 1 TABLET BY MOUTH EVERY DAY    multivitamin (ONE A DAY) tablet Take 1 Tab by mouth daily.  varicella-zoster recombinant, PF, (SHINGRIX) 50 mcg/0.5 mL susr injection 0.5 mL IM once now and then repeat in 2-6 months    aspirin delayed-release 81 mg tablet Take 1 Tablet by mouth two (2) times a day. Indications: blood clot prevention    amoxicillin (AMOXIL) 500 mg capsule TAKE 4 CAPSULES BY MOUTH 1 HOUR PRIOR TO DENTAL APPT (Patient not taking: Reported on 12/16/2021)     No current facility-administered medications for this visit.        Past Medical History:   Diagnosis Date    Arthritis     SHOULDER, R KNEE, R THUMB, LOWER BACK     Basal cell cancer     multiple    Chronic pain      AND L SHOULDER, LOWER BACK    Hyperlipidemia     Hypertension     Melanoma ([facility] [address]     multiple    Squamous cell carcinoma     multiple        Past Surgical History:   Procedure Laterality Date    COLONOSCOPY N/A 11/19/2021    Tubular adenoma - performed by Caroline [facility]za [facility] at [facility] ENDOSCOPY    HX COLONOSCOPY  2006    HX COLONOSCOPY  07/25/2016    diverticulosis; otherwise normal    HX CYST REMOVAL Left 1961    FOOT    HX HERNIA REPAIR  2008    bilateral     HX KNEE REPLACEMENT Left 2010    HX KNEE REPLACEMENT Left 7/28/16    due to failed replacement    HX KNEE REPLACEMENT Right 01/18/2022    HX OTHER SURGICAL  03/06/2019    banding of hemorrhoids    HX OTHER SURGICAL  2021    total knee replacement     HX SHOULDER REPLACEMENT Right 11/12/2020    HX TONSILLECTOMY      HX WISDOM TEETH EXTRACTION         Family History   Problem Relation Age of Onset    Stroke Mother     Cancer Mother         breast    Heart Failure Father     No Known Problems Daughter     Anesth Problems Neg Hx         Social History     Socioeconomic History    Marital status:      Spouse name: Not on file    Number of children: Not on file    Years of education: Not on file    Highest education level: Not on file   Occupational History    Not on file   Tobacco Use    Smoking status: Former Smoker     Packs/day: 2.00     Years: 15.00     Pack years: 30.00     Types: Cigarettes     Quit date: 1970     Years since quittin.8    Smokeless tobacco: Never Used   Vaping Use    Vaping Use: Never used   Substance and Sexual Activity    Alcohol use: Yes     Alcohol/week: 5.0 standard drinks     Types: 5 Cans of beer per week    Drug use: Yes     Types: Marijuana    Sexual activity: Not Currently     Partners: Female     Birth control/protection: Surgical, Abstinence   Other Topics Concern    Not on file   Social History Narrative    Not on file     Social Determinants of Health     Financial Resource Strain:     Difficulty of Paying Living Expenses: Not on file   Food Insecurity:     Worried About Running Out of Food in the Last Year: Not on file    Francie of Food in the Last Year: Not on file   Transportation Needs:     Lack of Transportation (Medical): Not on file    Lack of Transportation (Non-Medical):  Not on file   Physical Activity:     Days of Exercise per Week: Not on file    Minutes of Exercise per Session: Not on file   Stress:     Feeling of Stress : Not on file   Social Connections:     Frequency of Communication with Friends and Family: Not on file    Frequency of Social Gatherings with Friends and Family: Not on file    Attends Zoroastrianism Services: Not on file    Active Member of Clubs or Organizations: Not on file    Attends Club or Organization Meetings: Not on file    Marital Status: Not on file   Intimate Partner Violence:     Fear of Current or Ex-Partner: Not on file    Emotionally Abused: Not on file    Physically Abused: Not on file    Sexually Abused: Not on file   Housing Stability:     Unable to Pay for Housing in the Last Year: Not on file    Number of Places Lived in the Last Year: Not on file    Unstable Housing in the Last Year: Not on file       No orders of the defined types were placed in this encounter. An electronic signature was used to authenticate this note.   -- Kateryna Badillo MD

## 2022-03-11 ENCOUNTER — OFFICE VISIT (OUTPATIENT)
Dept: ORTHOPEDIC SURGERY | Age: 77
End: 2022-03-11
Payer: MEDICARE

## 2022-03-11 DIAGNOSIS — R26.2 DIFFICULTY WALKING: ICD-10-CM

## 2022-03-11 DIAGNOSIS — Z96.651 STATUS POST RIGHT KNEE REPLACEMENT: Primary | ICD-10-CM

## 2022-03-11 PROCEDURE — 97140 MANUAL THERAPY 1/> REGIONS: CPT | Performed by: PHYSICAL THERAPIST

## 2022-03-11 PROCEDURE — 97112 NEUROMUSCULAR REEDUCATION: CPT | Performed by: PHYSICAL THERAPIST

## 2022-03-11 PROCEDURE — 97110 THERAPEUTIC EXERCISES: CPT | Performed by: PHYSICAL THERAPIST

## 2022-03-11 NOTE — PROGRESS NOTES
PT DAILY TREATMENT NOTE    Patient Name: Lucero Steven  Date:3/11/2022  : 1945  [x]  Patient  Verified  Payor: Casandra  / Plan: VA MEDICARE PART A & B / Product Type: Medicare /    Total Treatment Time (min): 60  Total Treatment Time 1:1 (min): 45    Treatment Area: R knee    SUBJECTIVE  Subjective functional status/changes:   [] No changes reported  Patient states that he is frustrated with progress and continues to have a lot of difficulty straightening his knee. OBJECTIVE    Therapeutic Exercise: (minutes: 30)    PT Exercise Log        Activity/Exercise Date  22    Activity/Exercise      Slantboard   X      TKE  X     Balance board   X       Leg press90 lbs X   Bike 10 min. X   LAQ with 3lbs   X   Nustep 10 min. X     Lat walk with green tband X                    Manual Therapy: (minutes: 20)    Treatment consisted of grade 4 patellofemoral mobilizations. Grade 4 anterior tibiofemoral mobilization with knee in extension. Scar mobilization. STM to distal quad. STM to distal HS in prone. Manual knee extension stretch in prone. Neuromuscular stimulation: (10 minutes) VMS burst in conjunction with active strengthening and tactile stimulation to the [facility] [address].     ASSESSMENT  []  See Plan of Care  []  See progress note/recertification  [x]  Patient will continue to benefit from skilled therapy to address remaining functional deficits: antalgic gait pattern and stair mobility. Patient is making gradual progress with knee extension mobility. He was able to demonstrate -12 degrees knee extension following treatment. Continues to ambulate with antalgic gait pattern secondary to terminal extension loss. Continue plan with focus on restoring full knee extension mobility. Lateral stepping exercise added today to progress toward goal of returning to tennis. ICD-10-CM ICD-9-CM    1. Status post right knee replacement  Z96.651 V43.65    2.  Difficulty walking  R26.2 719.7 Progress towards goals / Updated goals:    PLAN  []  Upgrade activities as tolerated     [x]  Continue plan of care  []  Discharge due to:_  [] Other:_      Elysia Tesfaye, PT 3/11/2022  2:43 PM

## 2022-03-15 ENCOUNTER — OFFICE VISIT (OUTPATIENT)
Dept: ORTHOPEDIC SURGERY | Age: 77
End: 2022-03-15
Payer: MEDICARE

## 2022-03-15 DIAGNOSIS — Z96.651 STATUS POST RIGHT KNEE REPLACEMENT: Primary | ICD-10-CM

## 2022-03-15 DIAGNOSIS — R26.2 DIFFICULTY WALKING: ICD-10-CM

## 2022-03-15 PROCEDURE — 97140 MANUAL THERAPY 1/> REGIONS: CPT | Performed by: PHYSICAL THERAPIST

## 2022-03-15 PROCEDURE — 97112 NEUROMUSCULAR REEDUCATION: CPT | Performed by: PHYSICAL THERAPIST

## 2022-03-15 PROCEDURE — 97110 THERAPEUTIC EXERCISES: CPT | Performed by: PHYSICAL THERAPIST

## 2022-03-15 NOTE — PROGRESS NOTES
PT DAILY TREATMENT NOTE    Patient Name: Edwina Rogers  Date:3/15/2022  : 1945  [x]  Patient  Verified  Payor: Jordon Suarez / Plan: VA MEDICARE PART A & B / Product Type: Medicare /    Total Treatment Time (min): 60  Total Treatment Time 1:1 (min): 45    Treatment Area: R knee    SUBJECTIVE  Subjective functional status/changes:   [] No changes reported  Patient states that he is still having a very hard time straightening his knee. OBJECTIVE    Therapeutic Exercise: (minutes: 30)    PT Exercise Log        Activity/Exercise Date  03/15/22    Activity/Exercise      Slantboard   X      TKE  X     Balance board   X       Leg press90 lbs X   Bike 10 min. X   LAQ with 3lbs   X   Nustep 10 min. X     Lat walk with green tband X                    Manual Therapy: (minutes: 20)    Treatment consisted of grade 4 patellofemoral mobilizations. Grade 4 anterior tibiofemoral mobilization with knee in extension. Scar mobilization. STM to distal quad. STM to distal HS in prone. Manual knee extension stretch in prone. Neuromuscular stimulation: (10 minutes) VMS burst in conjunction with active strengthening and tactile stimulation to the [facility] [address] [facility].     ASSESSMENT  []  See Plan of Care  []  See progress note/recertification  [x]  Patient will continue to benefit from skilled therapy to address remaining functional deficits: antalgic gait pattern and stair mobility. Patient continues to have notable knee extension loss.  -15 degrees knee extension following treatment. Continue plan with focus on restoring full knee extension. ICD-10-CM ICD-9-CM    1. Status post right knee replacement  Z96.651 V43.65    2.  Difficulty walking  R26.2 719.7      Progress towards goals / Updated goals:    PLAN  []  Upgrade activities as tolerated     [x]  Continue plan of care  []  Discharge due to:_  [] Other:_      Angelito Garcia, PT 3/15/2022  2:43 PM

## 2022-03-18 ENCOUNTER — OFFICE VISIT (OUTPATIENT)
Dept: ORTHOPEDIC SURGERY | Age: 77
End: 2022-03-18
Payer: MEDICARE

## 2022-03-18 DIAGNOSIS — R26.2 DIFFICULTY WALKING: ICD-10-CM

## 2022-03-18 DIAGNOSIS — Z96.651 STATUS POST RIGHT KNEE REPLACEMENT: Primary | ICD-10-CM

## 2022-03-18 PROBLEM — G89.29 CHRONIC RIGHT SHOULDER PAIN: Status: ACTIVE | Noted: 2021-11-09

## 2022-03-18 PROBLEM — M25.511 CHRONIC RIGHT SHOULDER PAIN: Status: ACTIVE | Noted: 2021-11-09

## 2022-03-18 PROCEDURE — 97110 THERAPEUTIC EXERCISES: CPT | Performed by: PHYSICAL THERAPIST

## 2022-03-18 PROCEDURE — 97140 MANUAL THERAPY 1/> REGIONS: CPT | Performed by: PHYSICAL THERAPIST

## 2022-03-18 PROCEDURE — 97112 NEUROMUSCULAR REEDUCATION: CPT | Performed by: PHYSICAL THERAPIST

## 2022-03-18 NOTE — PROGRESS NOTES
PT DAILY TREATMENT NOTE    Patient Name: Edwina Rogers  Date:3/18/2022  : 1945  [x]  Patient  Verified  Payor: Jordon Suarez / Plan: VA MEDICARE PART A & B / Product Type: Medicare /    Total Treatment Time (min): 60  Total Treatment Time 1:1 (min): 45    Treatment Area: R knee    SUBJECTIVE  Subjective functional status/changes:   [] No changes reported  Patient is still having a hard time straightening his knee. OBJECTIVE    Therapeutic Exercise: (minutes: 30)    PT Exercise Log        Activity/Exercise Date  22    Activity/Exercise      Slantboard   X      TKE  X     Balance board   X       Leg press120 lbs X   Bike 10 min. X   LAQ with 3lbs   X   Nustep 10 min. X     Lat walk with green tband X   Extension prop with 5 lbs 5 min. X              Manual Therapy: (minutes: 20)    Treatment consisted of grade 4 patellofemoral mobilizations. Grade 4 anterior tibiofemoral mobilization with knee in extension. Scar mobilization. STM to distal quad. STM to distal HS in prone. Manual knee extension stretch in prone. Neuromuscular stimulation: (10 minutes) VMS burst in conjunction with active strengthening and tactile stimulation to the [facility].     ASSESSMENT  []  See Plan of Care  []  See progress note/recertification  [x]  Patient will continue to benefit from skilled therapy to address remaining functional deficits: antalgic gait pattern and stair mobility. Patient continues to have notable knee extension loss.  -15 degrees knee extension following treatment. Continue to focus on restoring terminal knee extension mobility and normalizing gait pattern. ICD-10-CM ICD-9-CM    1. Status post right knee replacement  Z96.651 V43.65    2.  Difficulty walking  R26.2 719.7      Progress towards goals / Updated goals:    PLAN  []  Upgrade activities as tolerated     [x]  Continue plan of care  []  Discharge due to:_  [] Other:_      Angelito Garcia, PT 3/18/2022  2:43 PM

## 2022-03-19 PROBLEM — R35.1 BENIGN PROSTATIC HYPERPLASIA WITH NOCTURIA: Status: ACTIVE | Noted: 2018-10-10

## 2022-03-19 PROBLEM — Z96.651 STATUS POST RIGHT KNEE REPLACEMENT: Status: ACTIVE | Noted: 2022-01-18

## 2022-03-19 PROBLEM — K64.1 GRADE II HEMORRHOIDS: Status: ACTIVE | Noted: 2019-03-14

## 2022-03-19 PROBLEM — M15.9 GENERALIZED OSTEOARTHRITIS: Status: ACTIVE | Noted: 2021-08-04

## 2022-03-19 PROBLEM — N40.1 BENIGN PROSTATIC HYPERPLASIA WITH NOCTURIA: Status: ACTIVE | Noted: 2018-10-10

## 2022-03-22 ENCOUNTER — OFFICE VISIT (OUTPATIENT)
Dept: ORTHOPEDIC SURGERY | Age: 77
End: 2022-03-22
Payer: MEDICARE

## 2022-03-22 DIAGNOSIS — Z96.651 STATUS POST RIGHT KNEE REPLACEMENT: Primary | ICD-10-CM

## 2022-03-22 DIAGNOSIS — R26.2 DIFFICULTY WALKING: ICD-10-CM

## 2022-03-22 PROCEDURE — 97110 THERAPEUTIC EXERCISES: CPT | Performed by: PHYSICAL THERAPIST

## 2022-03-22 PROCEDURE — 97140 MANUAL THERAPY 1/> REGIONS: CPT | Performed by: PHYSICAL THERAPIST

## 2022-03-22 NOTE — PROGRESS NOTES
PT DAILY TREATMENT NOTE    Patient Name: Cirilo Germain  Date:3/22/2022  : 1945  [x]  Patient  Verified  Payor: Beti Harper / Plan: VA MEDICARE PART A & B / Product Type: Medicare /    Total Treatment Time (min): 60  Total Treatment Time 1:1 (min): 30    Treatment Area: R knee    SUBJECTIVE  Subjective functional status/changes:   [] No changes reported    Patient states that his knee is starting to feel less achy. OBJECTIVE    Therapeutic Exercise: (minutes: 30, 1:1 15 minutes)    PT Exercise Log        Activity/Exercise Date  22    Activity/Exercise      Slantboard   X      TKE  X     Balance board   X       Leg press120 lbs X   Bike 10 min. X   LAQ with 3lbs   X   Nustep 10 min. X     Lat walk with green tband X   Extension prop with 5 lbs 5 min. X              Manual Therapy: (minutes: 20)    Treatment consisted of grade 4 patellofemoral mobilizations. Grade 4 anterior tibiofemoral mobilization with knee in extension. Scar mobilization. STM to distal quad. STM to distal HS in prone. Manual knee extension stretch in prone. Neuromuscular stimulation: (10 minutes) VMS burst in conjunction with active strengthening and tactile stimulation to the [facility].     ASSESSMENT  []  See Plan of Care  []  See progress note/recertification  [x]  Patient will continue to benefit from skilled therapy to address remaining functional deficits: antalgic gait pattern and stair mobility. Patient continues to have notable knee extension loss but is making gradual progress. -12 degrees knee extension following treatment. Continue to focus on restoring terminal knee extension mobility and normalizing gait pattern. ICD-10-CM ICD-9-CM    1. Status post right knee replacement  Z96.651 V43.65    2.  Difficulty walking  R26.2 719.7      Progress towards goals / Updated goals:    PLAN  []  Upgrade activities as tolerated     [x]  Continue plan of care  []  Discharge due to:_  [] Other:_      Margarita Bear Alycia Mae, PT 3/22/2022  2:43 PM

## 2022-03-24 ENCOUNTER — OFFICE VISIT (OUTPATIENT)
Dept: ORTHOPEDIC SURGERY | Age: 77
End: 2022-03-24
Payer: MEDICARE

## 2022-03-24 DIAGNOSIS — G89.29 CHRONIC LEFT SHOULDER PAIN: Primary | ICD-10-CM

## 2022-03-24 DIAGNOSIS — R26.2 DIFFICULTY WALKING: ICD-10-CM

## 2022-03-24 DIAGNOSIS — M25.512 CHRONIC LEFT SHOULDER PAIN: Primary | ICD-10-CM

## 2022-03-24 PROCEDURE — 97140 MANUAL THERAPY 1/> REGIONS: CPT | Performed by: PHYSICAL THERAPIST

## 2022-03-24 PROCEDURE — 97110 THERAPEUTIC EXERCISES: CPT | Performed by: PHYSICAL THERAPIST

## 2022-03-24 NOTE — PROGRESS NOTES
PT DAILY TREATMENT NOTE    Patient Name: Neida Jeronimo  Date:3/24/2022  : 1945  [x]  Patient  Verified  Payor: Mello Sanabria / Plan: VA MEDICARE PART A & B / Product Type: Medicare /    Total Treatment Time (min): 60  Total Treatment Time 1:1 (min): 30    Treatment Area: R knee    SUBJECTIVE  Subjective functional status/changes:   [] No changes reported    Patient states that he was not able to complete his exercises yesterday because he was very fatigued. OBJECTIVE    Therapeutic Exercise: (minutes: 30, 1:1 15 minutes)    PT Exercise Log        Activity/Exercise Date  22    Activity/Exercise      Slantboard   X      TKE  X     Balance board   X       Leg press120 lbs X   Bike 10 min. X   LAQ with 3lbs   X   Nustep 10 min. X     Lat walk with green tband X   Extension prop with 5 lbs 5 min. X              Manual Therapy: (minutes: 20)    Treatment consisted of grade 4 patellofemoral mobilizations. Grade 4 anterior tibiofemoral mobilization with knee in extension. Scar mobilization. STM to distal quad. STM to distal HS in prone. Manual knee extension stretch in prone. Neuromuscular stimulation: (10 minutes) VMS burst in conjunction with active strengthening and tactile stimulation to the [facility] QS and SAQ 3#    ASSESSMENT  []  See Plan of Care  []  See progress note/recertification  [x]  Patient will continue to benefit from skilled therapy to address remaining functional deficits: antalgic gait pattern and stair mobility. Patient continues to have notable knee extension loss but is making gradual progress. -10 degrees knee extension following treatment. Continue to focus on restoring terminal knee extension mobility and normalizing gait pattern. ICD-10-CM ICD-9-CM    1. Chronic left shoulder pain  M25.512 719.41     G89.29 338.29    2.  Difficulty walking  R26.2 719.7      Progress towards goals / Updated goals:    PLAN  []  Upgrade activities as tolerated     [x]  Continue plan of care  []  Discharge due to:_  [] Other:_      Mignon Constance, PT 3/24/2022  2:43 PM

## 2022-03-30 ENCOUNTER — OFFICE VISIT (OUTPATIENT)
Dept: ORTHOPEDIC SURGERY | Age: 77
End: 2022-03-30
Payer: MEDICARE

## 2022-03-30 DIAGNOSIS — M25.512 CHRONIC LEFT SHOULDER PAIN: Primary | ICD-10-CM

## 2022-03-30 DIAGNOSIS — R26.2 DIFFICULTY WALKING: ICD-10-CM

## 2022-03-30 DIAGNOSIS — G89.29 CHRONIC LEFT SHOULDER PAIN: Primary | ICD-10-CM

## 2022-03-30 PROCEDURE — 97110 THERAPEUTIC EXERCISES: CPT | Performed by: PHYSICAL THERAPIST

## 2022-03-30 PROCEDURE — 97140 MANUAL THERAPY 1/> REGIONS: CPT | Performed by: PHYSICAL THERAPIST

## 2022-03-30 NOTE — PROGRESS NOTES
PT DAILY TREATMENT NOTE    Patient Name: Lucero Steven  Date:3/30/2022  : 1945  [x]  Patient  Verified  Payor: Casandra  / Plan: [place] / Product Type: Medicare /    Total Treatment Time (min): 60  Total Treatment Time 1:1 (min): 30    Treatment Area: R knee    SUBJECTIVE  Subjective functional status/changes:   [] No changes reported    Patient states that he has been working on straightening his knee a lot and it seems to be getting easier. OBJECTIVE    Therapeutic Exercise: (minutes: 30, 1:1 15 minutes)    PT Exercise Log        Activity/Exercise Date  22    Activity/Exercise      Slantboard   X      TKE  X     Balance board (NMR)   X       Leg press120 lbs X   Bike 10 min. X   LAQ with 3lbs   X     Lat walk with green tband X   Extension prop with 5 lbs 5 min. X              Manual Therapy: (minutes: 20)    Treatment consisted of grade 4 patellofemoral mobilizations. Grade 4 anterior tibiofemoral mobilization with knee in extension. Scar mobilization. STM to distal quad. STM to distal HS in prone. Manual knee extension stretch in prone. Neuromuscular stimulation: (15 minutes NMES and balance exercises listed above) VMS burst in conjunction with active strengthening and tactile stimulation to the VMO QS and SAQ 3#    ASSESSMENT  []  See Plan of Care  []  See progress note/recertification  [x]  Patient will continue to benefit from skilled therapy to address remaining functional deficits: antalgic gait pattern and stair mobility. Patient continues to have notable knee extension loss but is making gradual progress. -10 degrees knee extension following treatment. Continue to focus on restoring terminal knee extension mobility and progressing single-leg quad strengthening exercises. ICD-10-CM ICD-9-CM    1. Chronic left shoulder pain  M25.512 719.41     G89.29 338.29    2.  Difficulty walking  R26.2 719.7      Progress towards goals / Updated goals:     PLAN  []  Upgrade activities as tolerated     [x]  Continue plan of care  []  Discharge due to:_  [] Other:_      Julio Hines, PT 3/30/2022  2:43 PM

## 2022-04-01 ENCOUNTER — OFFICE VISIT (OUTPATIENT)
Dept: ORTHOPEDIC SURGERY | Age: 77
End: 2022-04-01
Payer: MEDICARE

## 2022-04-01 DIAGNOSIS — R26.2 DIFFICULTY WALKING: ICD-10-CM

## 2022-04-01 DIAGNOSIS — Z96.651 STATUS POST RIGHT KNEE REPLACEMENT: Primary | ICD-10-CM

## 2022-04-01 PROCEDURE — 97140 MANUAL THERAPY 1/> REGIONS: CPT | Performed by: PHYSICAL THERAPIST

## 2022-04-01 PROCEDURE — 97110 THERAPEUTIC EXERCISES: CPT | Performed by: PHYSICAL THERAPIST

## 2022-04-01 NOTE — PROGRESS NOTES
PT DAILY TREATMENT NOTE    Patient Name: Greg Benitez  LWTM:3/7/[birth year]354  : 1945  [x]  Patient  Verified  Payor: Oscar Lorenzana / Plan: [place] MEDICARE PART A & B / Product Type: Medicare /    Total Treatment Time (min): 60  Total Treatment Time 1:1 (min): 30    Treatment Area: R knee    SUBJECTIVE  Subjective functional status/changes:   [] No changes reported    Patient feels his extension is much better than it used to be. He is anxious to return to playing tennis. OBJECTIVE    Therapeutic Exercise: (minutes: 30, 1:1 15 minutes)    PT Exercise Log        Activity/Exercise Date  22    Activity/Exercise      Slantboard   X      TKE  X     Balance board (NMR)   X       Leg press120 lbs X   Bike 10 min. X   LAQ with 3lbs   X     Lat walk with green tband X   Extension prop with 5 lbs 5 min. X              Manual Therapy: (minutes: 20)    Treatment consisted of grade 4 patellofemoral mobilizations. Grade 4 anterior tibiofemoral mobilization with knee in extension. Scar mobilization. STM to distal quad. STM to distal HS in prone. Manual knee extension stretch in prone. ASSESSMENT  []  See Plan of Care  []  See progress note/recertification  [x]  Patient will continue to benefit from skilled therapy to address remaining functional deficits: antalgic gait pattern and stair mobility. Extension loss remains. He does realize that he may never regain full motion. He has 3 more visits scheduled then plan to discharge home program.  Continue focusing on restoring extension and quad strengthening. ICD-10-CM ICD-9-CM    1. Status post right knee replacement  Z96.651 V43.65    2.  Difficulty walking  R26.2 719.7      Progress towards goals / Updated goals:    PLAN  []  Upgrade activities as tolerated     [x]  Continue plan of care  []  Discharge due to:_  [] Other:_      Beatrice Burgess, PT 2022  2:43 PM

## 2022-04-06 ENCOUNTER — OFFICE VISIT (OUTPATIENT)
Dept: ORTHOPEDIC SURGERY | Age: 77
End: 2022-04-06
Payer: MEDICARE

## 2022-04-06 DIAGNOSIS — R26.2 DIFFICULTY WALKING: ICD-10-CM

## 2022-04-06 DIAGNOSIS — Z96.651 STATUS POST RIGHT KNEE REPLACEMENT: Primary | ICD-10-CM

## 2022-04-06 PROCEDURE — 97140 MANUAL THERAPY 1/> REGIONS: CPT | Performed by: PHYSICAL THERAPIST

## 2022-04-06 PROCEDURE — 97110 THERAPEUTIC EXERCISES: CPT | Performed by: PHYSICAL THERAPIST

## 2022-04-06 NOTE — PROGRESS NOTES
PT DAILY TREATMENT NOTE    Patient Name: Kimberly Baca  Date:2022  : 1945  [x]  Patient  Verified  Payor: Elicia Young / Plan: [place] MEDICARE PART A & B / Product Type: Medicare /    Total Treatment Time (min): 60  Total Treatment Time 1:1 (min): 30  Referring Physician: Jacqueline Simons    Treatment Area: R knee    SUBJECTIVE  Subjective functional status/changes:   [] No changes reported    Patient states his knee has been doing well overall. He plans to play tennis within the next week. OBJECTIVE    Therapeutic Exercise: (minutes: 30, 1:1 15 minutes)    PT Exercise Log        Activity/Exercise Date  22    Activity/Exercise      Slantboard   X      TKE  X     Balance board (NMR)   X       Leg press120 lbs X   Bike 10 min. X   LAQ with 3lbs   X     Lat walk with green tband X   Extension prop with 5 lbs 5 min. X              Manual Therapy: (minutes: 20)    Treatment consisted of grade 4 patellofemoral mobilizations. Grade 4 anterior tibiofemoral mobilization with knee in extension. Scar mobilization. STM to distal quad. STM to distal HS in prone. Manual knee extension stretch in prone. ASSESSMENT  []  See Plan of Care  []  See progress note/recertification  [x]  Patient will continue to benefit from skilled therapy to address remaining functional deficits: antalgic gait pattern and stair mobility. Extension loss remains. -15 degrees knee extension following treatment. Continue plan with focus on restoring full terminal knee extension and progressing lateral exercises to return patient toward goal of playing tennis. ICD-10-CM ICD-9-CM    1. Status post right knee replacement  Z96.651 V43.65    2.  Difficulty walking  R26.2 719.7      Progress towards goals / Updated goals:    PLAN  []  Upgrade activities as tolerated     [x]  Continue plan of care  []  Discharge due to:_  [] Other:_      Yohana Wild, PT 2022  2:43 PM

## 2022-04-08 ENCOUNTER — OFFICE VISIT (OUTPATIENT)
Dept: ORTHOPEDIC SURGERY | Age: 77
End: 2022-04-08
Payer: MEDICARE

## 2022-04-08 DIAGNOSIS — Z96.651 STATUS POST RIGHT KNEE REPLACEMENT: Primary | ICD-10-CM

## 2022-04-08 DIAGNOSIS — R26.2 DIFFICULTY WALKING: ICD-10-CM

## 2022-04-08 PROCEDURE — 97140 MANUAL THERAPY 1/> REGIONS: CPT | Performed by: PHYSICAL THERAPIST

## 2022-04-08 PROCEDURE — 97110 THERAPEUTIC EXERCISES: CPT | Performed by: PHYSICAL THERAPIST

## 2022-04-08 NOTE — PROGRESS NOTES
PT DAILY TREATMENT NOTE    Patient Name: Mae Nieto  Date:2022  : 1945  [x]  Patient  Verified  Payor: Quinten Hamilton / Plan: [place] MEDICARE PART A & B / Product Type: Medicare /    Total Treatment Time (min): 60  Total Treatment Time 1:1 (min): 30  Referring Physician: Dominick Lebron    Treatment Area: R knee    SUBJECTIVE  Subjective functional status/changes:   [] No changes reported    Patient states that his knee has been doing well overall and he plans to play tennis this coming week. OBJECTIVE    Therapeutic Exercise: (minutes: 30, 1:1 15 minutes)    PT Exercise Log        Activity/Exercise Date  22    Activity/Exercise      Slantboard   X      TKE  X     Balance board (NMR)   X       Leg press120 lbs X   Bike 10 min. X   LAQ with 3lbs   X     Lat walk with green tband X   Extension prop with 5 lbs 5 min. X              Manual Therapy: (minutes: 20)    Treatment consisted of grade 4 patellofemoral mobilizations. Grade 4 anterior tibiofemoral mobilization with knee in extension. Scar mobilization. STM to distal quad. STM to distal HS in prone. Manual knee extension stretch in prone. ASSESSMENT  []  See Plan of Care  []  See progress note/recertification  [x]  Patient will continue to benefit from skilled therapy to address remaining functional deficits: antalgic gait pattern and stair mobility. Extension loss remains. -10 degrees knee extension following treatment. Continue plan with focus on restoring full terminal knee extension and progressing lateral exercises to return patient toward goal of playing tennis. Plan to discharge patient to independent [facility] at next visit. ICD-10-CM ICD-9-CM    1. Status post right knee replacement  Z96.651 V43.65    2.  Difficulty walking  R26.2 719.7      Progress towards goals / Updated goals:    PLAN  []  Upgrade activities as tolerated     [x]  Continue plan of care  []  Discharge due to:_  [] Other:_      Cr Fernandes PT 4/8/2022  2:43 PM

## 2022-04-12 RX ORDER — AMOXICILLIN 500 MG/1
CAPSULE ORAL
Qty: 16 CAPSULE | Refills: 1 | Status: SHIPPED | OUTPATIENT
Start: 2022-04-12 | End: 2022-08-09

## 2022-04-13 ENCOUNTER — OFFICE VISIT (OUTPATIENT)
Dept: ORTHOPEDIC SURGERY | Age: 77
End: 2022-04-13
Payer: MEDICARE

## 2022-04-13 DIAGNOSIS — R26.2 DIFFICULTY WALKING: ICD-10-CM

## 2022-04-13 DIAGNOSIS — Z96.651 STATUS POST RIGHT KNEE REPLACEMENT: Primary | ICD-10-CM

## 2022-04-13 PROCEDURE — 97140 MANUAL THERAPY 1/> REGIONS: CPT | Performed by: PHYSICAL THERAPIST

## 2022-04-13 PROCEDURE — 97110 THERAPEUTIC EXERCISES: CPT | Performed by: PHYSICAL THERAPIST

## 2022-04-13 NOTE — PROGRESS NOTES
PT DAILY TREATMENT NOTE    Patient Name: Melanie Zendejas  Date:2022  : 1945  [x]  Patient  Verified  Payor: Beatrice Bullard / Plan: [place] MEDICARE PART A & B / Product Type: Medicare /    Total Treatment Time (min): 60  Total Treatment Time 1:1 (min): 30  Referring Physician: Surekha Rebollar    Treatment Area: R knee    SUBJECTIVE  Subjective functional status/changes:   [] No changes reported    Patient states that his knee has been doing well overall and he plans to play tennis this coming week. OBJECTIVE    Therapeutic Exercise: (minutes: 30, 1:1 15 minutes)    PT Exercise Log        Activity/Exercise Date  22    Activity/Exercise      Slantboard   X      TKE  X     Balance board (NMR)   X       Leg press120 lbs X   Bike 10 min. X   LAQ with 3lbs   X     Lat walk with blue tband X   Extension prop with 5 lbs 5 min. X              Manual Therapy: (minutes: 20)    Treatment consisted of grade 4 patellofemoral mobilizations. Grade 4 anterior tibiofemoral mobilization with knee in extension. Scar mobilization. STM to distal quad. STM to distal HS in prone. Manual knee extension stretch in prone. ASSESSMENT  []  See Plan of Care  []  See progress note/recertification  []  Patient will continue to benefit from skilled therapy to address remaining functional deficits:     Patient has made good progress in therapy. Right knee extension loss remains with functionally he is doing very well. R knee ROM -8 to 120 degrees. R knee extension strength 5/5. No functional limitations. ICD-10-CM ICD-9-CM    1. Status post right knee replacement  Z96.651 V43.65    2.  Difficulty walking  R26.2 719.7      Progress towards goals / Updated goals:    PLAN  []  Upgrade activities as tolerated     [x]  Continue plan of care  []  Discharge due to:_  [] Other:_      Yesy Donahue, PT 2022  2:43 PM

## 2022-05-07 DIAGNOSIS — I10 HYPERTENSION, ESSENTIAL: ICD-10-CM

## 2022-05-08 RX ORDER — HYDROCHLOROTHIAZIDE 25 MG/1
TABLET ORAL
Qty: 90 TABLET | Refills: 2 | Status: SHIPPED | OUTPATIENT
Start: 2022-05-08

## 2022-05-23 RX ORDER — ATORVASTATIN CALCIUM 10 MG/1
TABLET, FILM COATED ORAL
Qty: 90 TABLET | Refills: 3 | Status: SHIPPED | OUTPATIENT
Start: 2022-05-23

## 2022-07-27 RX ORDER — CELECOXIB 200 MG/1
CAPSULE ORAL
Qty: 90 CAPSULE | Refills: 1 | Status: SHIPPED | OUTPATIENT
Start: 2022-07-27

## 2022-07-27 NOTE — TELEPHONE ENCOUNTER
Future Appointments   Date Time Provider Sammy Poon   8/9/2022  2:00 PM Angy Mckeon MD [facility]

## 2022-08-09 ENCOUNTER — OFFICE VISIT (OUTPATIENT)
Dept: INTERNAL MEDICINE CLINIC | Age: 77
End: 2022-08-09
Payer: MEDICARE

## 2022-08-09 VITALS
OXYGEN SATURATION: 97 % | SYSTOLIC BLOOD PRESSURE: 112 MMHG | TEMPERATURE: 98.3 F | DIASTOLIC BLOOD PRESSURE: 74 MMHG | HEART RATE: 87 BPM | HEIGHT: 74 IN | RESPIRATION RATE: 18 BRPM | BODY MASS INDEX: 20.46 KG/M2 | WEIGHT: 159.4 LBS

## 2022-08-09 DIAGNOSIS — R35.1 BENIGN PROSTATIC HYPERPLASIA WITH NOCTURIA: ICD-10-CM

## 2022-08-09 DIAGNOSIS — R73.03 PRE-DIABETES: ICD-10-CM

## 2022-08-09 DIAGNOSIS — E78.00 PURE HYPERCHOLESTEROLEMIA: ICD-10-CM

## 2022-08-09 DIAGNOSIS — N40.1 BENIGN PROSTATIC HYPERPLASIA WITH NOCTURIA: ICD-10-CM

## 2022-08-09 DIAGNOSIS — R14.0 ABDOMINAL BLOATING: ICD-10-CM

## 2022-08-09 DIAGNOSIS — I10 HYPERTENSION, ESSENTIAL: Primary | ICD-10-CM

## 2022-08-09 PROBLEM — G89.29 CHRONIC RIGHT SHOULDER PAIN: Status: RESOLVED | Noted: 2021-11-09 | Resolved: 2022-08-09

## 2022-08-09 PROBLEM — M25.511 CHRONIC RIGHT SHOULDER PAIN: Status: RESOLVED | Noted: 2021-11-09 | Resolved: 2022-08-09

## 2022-08-09 PROCEDURE — G8754 DIAS BP LESS 90: HCPCS | Performed by: INTERNAL MEDICINE

## 2022-08-09 PROCEDURE — G8510 SCR DEP NEG, NO PLAN REQD: HCPCS | Performed by: INTERNAL MEDICINE

## 2022-08-09 PROCEDURE — G8752 SYS BP LESS 140: HCPCS | Performed by: INTERNAL MEDICINE

## 2022-08-09 PROCEDURE — G8536 NO DOC ELDER MAL SCRN: HCPCS | Performed by: INTERNAL MEDICINE

## 2022-08-09 PROCEDURE — G8420 CALC BMI NORM PARAMETERS: HCPCS | Performed by: INTERNAL MEDICINE

## 2022-08-09 PROCEDURE — 1101F PT FALLS ASSESS-DOCD LE1/YR: CPT | Performed by: INTERNAL MEDICINE

## 2022-08-09 PROCEDURE — G0463 HOSPITAL OUTPT CLINIC VISIT: HCPCS | Performed by: INTERNAL MEDICINE

## 2022-08-09 PROCEDURE — G8427 DOCREV CUR MEDS BY ELIG CLIN: HCPCS | Performed by: INTERNAL MEDICINE

## 2022-08-09 PROCEDURE — 99214 OFFICE O/P EST MOD 30 MIN: CPT | Performed by: INTERNAL MEDICINE

## 2022-08-09 NOTE — ASSESSMENT & PLAN NOTE
Chronic issue, stable, reviewed differential, favor diet triggers, reviewed trigger foods he can try to remove. He is interested in a probiotic and beano. Recommended to start w/ probiotic and then add in or replace w/ beano depending on how he responses. Red flags and expectations were reviewed & discussed with the him.

## 2022-08-09 NOTE — PROGRESS NOTES
Samuel Ramirez is a 68 y.o. male who was seen in clinic today (8/9/2022). Assessment & Plan:   Below is the assessment and plan developed based on review of pertinent history, physical exam, labs, studies, and medications. 1. Hypertension, essential  Assessment & Plan:  at goal, continue current treatment pending review of labs    Orders:  -     METABOLIC PANEL, COMPREHENSIVE; Future  -     CBC W/O DIFF; Future  2. Pure hypercholesterolemia  Assessment & Plan:  well controlled, continue current treatment pending review of labs    Orders:  -     METABOLIC PANEL, COMPREHENSIVE; Future  -     LIPID PANEL; Future  3. Pre-diabetes  Assessment & Plan:  Due for labs, diet it appropriate, weight is appropriate, will monitor   Orders:  -     METABOLIC PANEL, COMPREHENSIVE; Future  -     HEMOGLOBIN A1C WITH EAG; Future  4. Benign prostatic hyperplasia with nocturia  Assessment & Plan:  stable, continue current treatment    5. Abdominal bloating  Assessment & Plan:  Chronic issue, stable, reviewed differential, favor diet triggers, reviewed trigger foods he can try to remove. He is interested in a probiotic and beano. Recommended to start w/ probiotic and then add in or replace w/ beano depending on how he responses. Red flags and expectations were reviewed & discussed with the him. Follow-up and Dispositions    Return in about 6 months (around 2/9/2023) for FULL PHYSICAL. Subjective/Objective: Lne Mckeon was seen today for Hypertension      Since last visit: no changes    Cardiovascular Review  The patient has hypertension and hyperlipidemia. Since last visit: no changes  He reports taking medications as instructed, no medication side effects noted, patient does not perform home BP monitoring. Diet and Lifestyle: generally follows a low fat low cholesterol diet, generally follows a low sodium diet, exercises regularly. Labs: reviewed and discussed with patient.         Urology Review  He is here today because of BPH. His symptoms include: nocturia x 1-3. He denies: urinary hesitancy, urinary frequency, double voiding, weak stream.          Prior to Admission medications    Medication Sig Start Date End Date Taking? Authorizing Provider   celecoxib (CELEBREX) 200 mg capsule TAKE ONE CAPSULE BY MOUTH DAILY 7/27/22  Yes Huong Gastelum MD   atorvastatin (LIPITOR) 10 mg tablet TAKE 1 TABLET BY MOUTH EVERY DAY 5/23/22  Yes Huong Gastelum MD   hydroCHLOROthiazide (HYDRODIURIL) 25 mg tablet TAKE 1 TABLET BY MOUTH EVERY DAY 5/8/22  Yes Huong Gastelum MD   tamsulosin [facility]) 0.4 mg capsule  11/23/21  Yes Provider, Historical   amoxicillin (AMOXIL) 500 mg capsule Take four capsules one hour prior to dental appointment 4/12/22 8/9/22  Snow Braga MD   varicella-zoster recombinant, PF, [facility]) 50 mcg/0.5 mL susr injection 0.5 mL IM once now and then repeat in 2-6 months  Patient not taking: Reported on 8/9/2022 2/7/22   Huong Gastelum MD   aspirin delayed-release 81 mg tablet Take 1 Tablet by mouth two (2) times a day. Indications: blood clot prevention 1/18/22 8/9/22  Jase POTTS PA-C   amoxicillin (AMOXIL) 500 mg capsule TAKE 4 CAPSULES BY MOUTH 1 HOUR PRIOR TO DENTAL APPT  Patient not taking: No sig reported 8/1/19   Provider, Historical   multivitamin (ONE A DAY) tablet Take 1 Tab by mouth daily. 8/9/22  Provider, Historical        Review of Systems   Constitutional:  Negative for malaise/fatigue and weight loss. Respiratory:  Negative for cough and shortness of breath. Cardiovascular:  Negative for chest pain, palpitations and leg swelling. Gastrointestinal:  Negative for abdominal pain, constipation, diarrhea, heartburn, nausea and vomiting. Musculoskeletal:  Positive for joint pain. Negative for myalgias. Skin:  Negative for rash. Neurological:  Negative for dizziness and headaches. Psychiatric/Behavioral:  Negative for depression.  The patient is not nervous/anxious and does not have insomnia. Physical Exam  Constitutional:       General: He is not in acute distress. Eyes:      Conjunctiva/sclera: Conjunctivae normal.   Cardiovascular:      Rate and Rhythm: Regular rhythm. Heart sounds: No murmur heard. Pulmonary:      Effort: Pulmonary effort is normal.      Breath sounds: Normal breath sounds. No decreased breath sounds or wheezing. Abdominal:      General: Bowel sounds are normal.      Palpations: Abdomen is soft. There is no hepatomegaly or splenomegaly. Tenderness: no abdominal tenderness   Musculoskeletal:      Right lower leg: No edema. Left lower leg: No edema.    Psychiatric:         Mood and Affect: Mood and affect normal.         Behavior: Behavior normal.       Visit Vitals  /74 (BP 1 Location: Left arm, BP Patient Position: Sitting, BP Cuff Size: Adult)   Pulse 87   Temp 98.3 °F (36.8 °C) (Temporal)   Resp 18   Ht 6' 2\" (1.88 m)   Wt 159 lb 6.4 oz (72.3 kg)   SpO2 97%   BMI 20.47 kg/m²       Mikhail Soria MD

## 2022-08-12 LAB
ALBUMIN SERPL-MCNC: 4.4 G/DL (ref 3.7–4.7)
ALBUMIN/GLOB SERPL: 1.8 {RATIO} (ref 1.2–2.2)
ALP SERPL-CCNC: 79 IU/L (ref 44–121)
ALT SERPL-CCNC: 9 IU/L (ref 0–44)
AST SERPL-CCNC: 14 IU/L (ref 0–40)
BILIRUB SERPL-MCNC: 0.7 MG/DL (ref 0–1.2)
BUN SERPL-MCNC: 30 MG/DL (ref 8–27)
BUN/CREAT SERPL: 35 (ref 10–24)
CALCIUM SERPL-MCNC: 9.5 MG/DL (ref 8.6–10.2)
CHLORIDE SERPL-SCNC: 101 MMOL/L (ref 96–106)
CHOLEST SERPL-MCNC: 183 MG/DL (ref 100–199)
CO2 SERPL-SCNC: 24 MMOL/L (ref 20–29)
CREAT SERPL-MCNC: 0.85 MG/DL (ref 0.76–1.27)
EGFR: 89 ML/MIN/1.73
ERYTHROCYTE [DISTWIDTH] IN BLOOD BY AUTOMATED COUNT: 13.3 % (ref 11.6–15.4)
EST. AVERAGE GLUCOSE BLD GHB EST-MCNC: 123 MG/DL
GLOBULIN SER CALC-MCNC: 2.4 G/DL (ref 1.5–4.5)
GLUCOSE SERPL-MCNC: 95 MG/DL (ref 65–99)
HBA1C MFR BLD: 5.9 % (ref 4.8–5.6)
HCT VFR BLD AUTO: 46.2 % (ref 37.5–51)
HDLC SERPL-MCNC: 75 MG/DL
HGB BLD-MCNC: 15.1 G/DL (ref 13–17.7)
LDLC SERPL CALC-MCNC: 96 MG/DL (ref 0–99)
MCH RBC QN AUTO: 31.1 PG (ref 26.6–33)
MCHC RBC AUTO-ENTMCNC: 32.7 G/DL (ref 31.5–35.7)
MCV RBC AUTO: 95 FL (ref 79–97)
PLATELET # BLD AUTO: 241 X10E3/UL (ref 150–450)
POTASSIUM SERPL-SCNC: 3.4 MMOL/L (ref 3.5–5.2)
PROT SERPL-MCNC: 6.8 G/DL (ref 6–8.5)
RBC # BLD AUTO: 4.86 X10E6/UL (ref 4.14–5.8)
SODIUM SERPL-SCNC: 144 MMOL/L (ref 134–144)
TRIGL SERPL-MCNC: 61 MG/DL (ref 0–149)
VLDLC SERPL CALC-MCNC: 12 MG/DL (ref 5–40)
WBC # BLD AUTO: 8.4 X10E3/UL (ref 3.4–10.8)

## 2022-08-12 NOTE — PROGRESS NOTES
Results released to patient via [employer]. All labs are stable or at goal for him, except for low potassium. Runs low normal, is just below. Will defer adding in a K supplement at this time. No changes to HCTZ for now.

## 2023-01-19 ENCOUNTER — OFFICE VISIT (OUTPATIENT)
Dept: ORTHOPEDIC SURGERY | Age: 78
End: 2023-01-19
Payer: MEDICARE

## 2023-01-19 DIAGNOSIS — Z96.651 STATUS POST TOTAL KNEE REPLACEMENT, RIGHT: Primary | ICD-10-CM

## 2023-01-19 NOTE — LETTER
1/19/2023    Patient: Nik Rosas   YOB: 1945   Date of Visit: 1/19/2023     Leigh Harris, [address]  [address] [address] [facility] [id number]  [address] [facility] [address]    Dear Leigh Harris MD,      Thank you for referring Mr. Lucia Ashley to [facility] for evaluation. My notes for this consultation are attached. If you have questions, please do not hesitate to call me. I look forward to following your patient along with you.       Sincerely,    Kenna Sanford MD

## 2023-01-19 NOTE — PROGRESS NOTES
Keke Culp (: 1945) is a 68 y.o. male, patient, here for evaluation of the following chief complaint(s):  Surgical Follow-up (RTK: 22/)       SUBJECTIVE/OBJECTIVE:  Keke Culp presents today for routine follow-up 1 year after right primary total knee replacement. Overall he is happy with his function. He has an intermittent twinge of pain medially when he gets up to move after sitting in 1 position for long periods. Has no pain at all with activity. Denies subjective instability. Right knee does not limit what he wants to do. He plays tennis at least twice weekly. I revised his left total knee just over 6 years ago. Left knee also continues to function well. PHYSICAL EXAM:  Vitals: There were no vitals taken for this visit. There is no height or weight on file to calculate BMI.      68y.o. year old M, no distress. Ambulates without a limp. Right knee is in neutral alignment. Healed midline anterior scar. No warmth swelling or effusion. No focal tenderness over the medial joint line. Lacks just 1 or 2 degrees of full active extension, flexion to approximately 125. Patella tracks centrally. No instability. Symmetrical palpable distal pulses. No gross motor or sensory deficits in lower extremities. No distal edema. IMAGING:  Radiographs: XR Results (most recent):  Results from Appointment encounter on 23    XR KNEE RT 3 V    Narrative  3 x-ray views of right knee including AP, lateral, sunrise demonstrate satisfactory position and alignment of cemented total knee components without evidence of loosening. No lucencies. Patella tracks centrally. Vascular calcification evident. ASSESSMENT/PLAN:  1. Status post total knee replacement, right  -     XR KNEE RT 3 V; Future    Well-functioning right total knee, 1 year postop. Continue low impact activities as tolerated.   He will return in a few years for routine x-ray follow-up of both knees, sooner if needed       No follow-ups on file. Review Of Systems     Patient denies any recent fever, chills, nausea, vomiting, chest pain, or shortness of breath. Allergies   Allergen Reactions    Acetaminophen Other (comments)     UNABLE TO URINATE, CAUSES FLANK PAIN       Current Outpatient Medications   Medication Sig    celecoxib (CELEBREX) 200 mg capsule TAKE ONE CAPSULE BY MOUTH DAILY    atorvastatin (LIPITOR) 10 mg tablet TAKE 1 TABLET BY MOUTH EVERY DAY    hydroCHLOROthiazide (HYDRODIURIL) 25 mg tablet TAKE 1 TABLET BY MOUTH EVERY DAY    tamsulosin (FLOMAX) 0.4 mg capsule     varicella-zoster recombinant, PF, (SHINGRIX) 50 mcg/0.5 mL susr injection 0.5 mL IM once now and then repeat in 2-6 months (Patient not taking: No sig reported)    amoxicillin (AMOXIL) 500 mg capsule TAKE 4 CAPSULES BY MOUTH 1 HOUR PRIOR TO DENTAL APPT (Patient not taking: No sig reported)     No current facility-administered medications for this visit.        Past Medical History:   Diagnosis Date    Arthritis     SHOULDER, R KNEE, R THUMB, LOWER BACK     Basal cell cancer     multiple    Chronic pain      AND L SHOULDER, LOWER BACK    Hyperlipidemia     Hypertension     Melanoma ([facility] [address]     multiple    Squamous cell carcinoma     multiple        Past Surgical History:   Procedure Laterality Date    COLONOSCOPY N/A 11/19/2021    Tubular adenoma - performed by Atif Presley MD at [facility] ENDOSCOPY    HX COLONOSCOPY  2006    HX COLONOSCOPY  07/25/2016    diverticulosis; otherwise normal    HX CYST REMOVAL Left 1961    FOOT    HX HERNIA REPAIR  2008    bilateral     HX KNEE REPLACEMENT Left 2010    HX KNEE REPLACEMENT Left 7/28/16    due to failed replacement    HX KNEE REPLACEMENT Right 01/18/2022    HX OTHER SURGICAL  03/06/2019    banding of hemorrhoids    HX OTHER SURGICAL  2021    total knee replacement     HX SHOULDER REPLACEMENT Right 11/12/2020    HX TONSILLECTOMY      HX WISDOM TEETH EXTRACTION         Family History Problem Relation Age of Onset    Stroke Mother     Cancer Mother         breast    Heart Failure Father     No Known Problems Daughter     Anesth Problems Neg Hx         Social History     Socioeconomic History    Marital status:      Spouse name: Not on file    Number of children: Not on file    Years of education: Not on file    Highest education level: Not on file   Occupational History    Not on file   Tobacco Use    Smoking status: Former     Packs/day: 2.00     Years: 15.00     Pack years: 30.00     Types: Cigarettes     Quit date: 1970     Years since quittin.6    Smokeless tobacco: Never   Vaping Use    Vaping Use: Never used   Substance and Sexual Activity    Alcohol use: Yes     Alcohol/week: 5.0 standard drinks     Types: 5 Cans of beer per week    Drug use: Yes     Types: Marijuana    Sexual activity: Not Currently     Partners: Female     Birth control/protection: Surgical, Abstinence   Other Topics Concern    Not on file   Social History Narrative    Not on file     Social Determinants of Health     Financial Resource Strain: Not on file   Food Insecurity: Not on file   Transportation Needs: Not on file   Physical Activity: Not on file   Stress: Not on file   Social Connections: Not on file   Intimate Partner Violence: Not on file   Housing Stability: Not on file       Orders Placed This Encounter    XR KNEE RT 3 V     Standing Status:   Future     Number of Occurrences:   1     Standing Expiration Date:   2024        An electronic signature was used to authenticate this note.   -- Edson Red MD

## 2023-01-31 RX ORDER — CELECOXIB 200 MG/1
CAPSULE ORAL
Qty: 90 CAPSULE | Refills: 1 | Status: SHIPPED | OUTPATIENT
Start: 2023-01-31

## 2023-02-01 DIAGNOSIS — I10 HYPERTENSION, ESSENTIAL: ICD-10-CM

## 2023-02-01 RX ORDER — HYDROCHLOROTHIAZIDE 25 MG/1
TABLET ORAL
Qty: 90 TABLET | Refills: 1 | Status: SHIPPED | OUTPATIENT
Start: 2023-02-01

## 2023-02-01 NOTE — TELEPHONE ENCOUNTER
Future Appointments   Date Time Provider Sammy Poon   2/22/2023  2:00 PM Cheryle Samson MD [facility]

## 2023-02-02 NOTE — TELEPHONE ENCOUNTER
Future Appointments   Date Time Provider Sammy Poon   2/22/2023  2:00 PM Kristina Arzate MD [facility]

## 2023-02-22 ENCOUNTER — OFFICE VISIT (OUTPATIENT)
Dept: INTERNAL MEDICINE CLINIC | Age: 78
End: 2023-02-22
Payer: MEDICARE

## 2023-02-22 VITALS
WEIGHT: 165.6 LBS | DIASTOLIC BLOOD PRESSURE: 72 MMHG | SYSTOLIC BLOOD PRESSURE: 108 MMHG | TEMPERATURE: 98.2 F | RESPIRATION RATE: 18 BRPM | OXYGEN SATURATION: 96 % | HEIGHT: 74 IN | BODY MASS INDEX: 21.25 KG/M2 | HEART RATE: 71 BPM

## 2023-02-22 DIAGNOSIS — N40.1 BENIGN PROSTATIC HYPERPLASIA WITH NOCTURIA: ICD-10-CM

## 2023-02-22 DIAGNOSIS — R14.0 ABDOMINAL BLOATING: ICD-10-CM

## 2023-02-22 DIAGNOSIS — R35.1 BENIGN PROSTATIC HYPERPLASIA WITH NOCTURIA: ICD-10-CM

## 2023-02-22 DIAGNOSIS — R73.03 PRE-DIABETES: ICD-10-CM

## 2023-02-22 DIAGNOSIS — Z71.89 ADVANCED CARE PLANNING/COUNSELING DISCUSSION: ICD-10-CM

## 2023-02-22 DIAGNOSIS — Z00.00 MEDICARE ANNUAL WELLNESS VISIT, SUBSEQUENT: Primary | ICD-10-CM

## 2023-02-22 DIAGNOSIS — E78.00 PURE HYPERCHOLESTEROLEMIA: ICD-10-CM

## 2023-02-22 DIAGNOSIS — I10 HYPERTENSION, ESSENTIAL: ICD-10-CM

## 2023-02-22 PROCEDURE — 1101F PT FALLS ASSESS-DOCD LE1/YR: CPT | Performed by: INTERNAL MEDICINE

## 2023-02-22 PROCEDURE — G8420 CALC BMI NORM PARAMETERS: HCPCS | Performed by: INTERNAL MEDICINE

## 2023-02-22 PROCEDURE — G8427 DOCREV CUR MEDS BY ELIG CLIN: HCPCS | Performed by: INTERNAL MEDICINE

## 2023-02-22 PROCEDURE — G8510 SCR DEP NEG, NO PLAN REQD: HCPCS | Performed by: INTERNAL MEDICINE

## 2023-02-22 PROCEDURE — G8536 NO DOC ELDER MAL SCRN: HCPCS | Performed by: INTERNAL MEDICINE

## 2023-02-22 PROCEDURE — G0439 PPPS, SUBSEQ VISIT: HCPCS | Performed by: INTERNAL MEDICINE

## 2023-02-22 RX ORDER — CHOLECALCIFEROL (VITAMIN D3) 50 MCG
CAPSULE ORAL
COMMUNITY

## 2023-02-22 RX ORDER — HYDROCHLOROTHIAZIDE 25 MG/1
12.5 TABLET ORAL DAILY
Qty: 1 TABLET | Refills: 0
Start: 2023-02-22

## 2023-02-22 NOTE — ASSESSMENT & PLAN NOTE
Well controlled, likely to well controlled, will decrease dose by 1/2.   He just had medications refilled so will defer sending in today

## 2023-02-22 NOTE — PATIENT INSTRUCTIONS
Medicare Wellness Visit, Male    The best way to live healthy is to have a lifestyle where you eat a well-balanced diet, exercise regularly, limit alcohol use, and quit all forms of tobacco/nicotine, if applicable. Regular preventive services are another way to keep healthy. Preventive services (vaccines, screening tests, monitoring & exams) can help personalize your care plan, which helps you manage your own care. Screening tests can find health problems at the earliest stages, when they are easiest to treat. [facility] follows the current, evidence-based guidelines published by the [facility] ([facility]) when recommending preventive services for our patients. Because we follow these guidelines, sometimes recommendations change over time as research supports it. (For example, a prostate screening blood test is no longer routinely recommended for men with no symptoms). Of course, you and your doctor may decide to screen more often for some diseases, based on your risk and co-morbidities (chronic disease you are already diagnosed with). Preventive services for you include:  - Medicare offers their members a free annual wellness visit, which is time for you and your primary care provider to discuss and plan for your preventive service needs.  Take advantage of this benefit every year!    -Over the age of 72 should receive the recommended pneumonia vaccines.   -All adults should have a flu vaccine yearly.  -All adults should receive a tetanus vaccine every 10 years.  -Over the age of 48 should receive the shingles vaccines.    -All adults should be screened once for Hepatitis C.  -All adults age 38-68 who are overweight should have a diabetes screening test once every three years.   -Other screening tests & preventive services for persons with diabetes include: an eye exam to screen for diabetic retinopathy, a kidney function test, a foot exam, and stricter control over your cholesterol.   -Cardiovascular screening for adults with routine risk involves an electrocardiogram (ECG) at intervals determined by the provider.     -Colorectal cancer screening should be done for adults age 43-69 with no increased risk factors for colorectal cancer. There are a number of acceptable methods of screening for this type of cancer. Each test has its own benefits and drawbacks. Discuss with your provider what is most appropriate for you during your annual wellness visit. The different tests include: colonoscopy (considered the best screening method), a fecal occult blood test, a fecal DNA test, and sigmoidoscopy.    -Lung cancer screening is recommended annually with a low dose CT scan for adults between age 54 and 68, who have smoked at least 30 pack years (equivalent of 1 pack per day for 30 days), and who is a current smoker or quit less than 15 years ago. -An Abdominal Aortic Aneurysm (AAA) Screening is recommended for men age 73-68 who has ever smoked in their lifetime. Here is a list of your current Health Maintenance items (your personalized list of preventive services) with a due date: There are no preventive care reminders to display for this patient.

## 2023-02-22 NOTE — ASSESSMENT & PLAN NOTE
Chronic, fluctuating symptoms but overall stable. He reports probiotics did help. He has tried beano intermittently w/ some relief. Reviewed diet triggers. No red flags.   Will monitor

## 2023-02-22 NOTE — ASSESSMENT & PLAN NOTE
Unclear control, with weight gain he is in a healthier weight, reviewed diet changes, will defer labs and repeat at f/u

## 2023-02-22 NOTE — PROGRESS NOTES
Myles Puentes is a 66 y.o. male who was seen in clinic today (2/22/2023) for a full physical.          Assessment & Plan:   Below is the assessment and plan developed based on review of pertinent history, physical exam, labs, studies, and medications. 1. Medicare annual wellness visit, subsequent  2. Advanced care planning/counseling discussion  3. Hypertension, essential  Assessment & Plan:  Well controlled, likely to well controlled, will decrease dose by 1/2. He just had medications refilled so will defer sending in today   Orders:  -     hydroCHLOROthiazide (HYDRODIURIL) 25 mg tablet; Take 0.5 Tablets by mouth daily. , No Print, Disp-1 Tablet, R-0  4. Pure hypercholesterolemia  Assessment & Plan:  well controlled, continue current treatment    5. Pre-diabetes  Assessment & Plan:  Unclear control, with weight gain he is in a healthier weight, reviewed diet changes, will defer labs and repeat at f/u   6. Benign prostatic hyperplasia with nocturia  Assessment & Plan:  Stable, he is happy with control, monitored by specialist. No acute findings meriting change in the plan  7. Abdominal bloating  Assessment & Plan:  Chronic, fluctuating symptoms but overall stable. He reports probiotics did help. He has tried beano intermittently w/ some relief. Reviewed diet triggers. No red flags. Will monitor     Follow-up and Dispositions    Return in about 6 months (around 8/22/2023), or if symptoms worsen or fail to improve. Subjective: Prince tolentino is here today for a full physical.      Annual Wellness Visit- Subsequent Visit    Since last visit: weight has increased    The following acute and/or chronic problems were addressed today:    Cardiovascular Review  The patient has hypertension and hyperlipidemia. He reports taking medications as instructed, no medication side effects noted, patient does not perform home BP monitoring.   He generally follows a low fat low cholesterol diet, generally follows a low sodium diet. Endocrine Review  He is seen for pre-diabetes. Diabetic diet compliance: compliant all of the time. Lab review: labs reviewed and discussed with patient. End of Life Planning: This was discussed with him today and he has an advanced directive - a copy has been provided. Reviewed DNR/DNI and patient is not interested. Depression Screen:  3 most recent PHQ Screens 2023   Little interest or pleasure in doing things Not at all   Feeling down, depressed, irritable, or hopeless Not at all   Total Score PHQ 2 0         Fall Risk:   Fall Risk Assessment, last 12 mths 2023   Able to walk? Yes   Fall in past 12 months? 0   Do you feel unsteady? 0   Are you worried about falling 0       Abuse Screen:  Abuse Screening Questionnaire 2023   Do you ever feel afraid of your partner? N   Are you in a relationship with someone who physically or mentally threatens you? N   Is it safe for you to go home? Y       Do you average more than 1 drink per night or more than 7 drinks a week: No    In the past three months have you have had more than 4 drinks containing alcohol on one occasion: No          Health Maintenance:   Daily Aspirin: yes  AAA Screenin19     Immunizations:   Covid: up to date   Influenza: up to date   Tetanus: up to date   Shingles: has received 1st, due for 2nd next month   Pneumonia: up to date  Cancer screening:   Prostate: guidelines reviewed, declined  Colon: guidelines reviewed, up to date    Functional Ability and Level of Safety:   Hearing:  Hearing: (P) Patient wears hearing aid     Cognition Screen:  Has your family/caregiver stated any concerns about your memory?: (P) No  Cognitive Screening: Normal - Mini Cog Test. Clock 2/2, 3 word recall - 1/3. Ambulation:  Patient ambulates: (P) with no difficulty    Activities of Daily Living:   The home contains: (P) grab bars  Functional ADLs: (P) Patient does total self care  Exercise: very active    Adult Nutrition Screen:  No risk factors noted. Patient Care Team:  Bebeto Brown MD as PCP - General (Internal Medicine Physician)  Bebeto Brown MD as PCP - [facility]  Madelyn Jeong DO (Dermatology Physician)  Lindsay Key MD (Orthopedic Surgery)  Malka Johnston MD (Optometry)  Arsenio Hayes MD (Orthopedic Surgery)  Latricia Fails, [id number] (Physician Assistant)  Jeovanny Paniagua, RN as Nurse Navigator (Orthopedic Surgery)       The following sections were reviewed & updated as appropriate: Problem List, Allergies, PMH, PSH, FH, and SH. Prior to Admission medications    Medication Sig Start Date End Date Taking? Authorizing Provider   B.animalis,bifid,infantis,long (Probiotic 4X) 10-15 mg TbEC Take  by mouth. Yes Provider, Historical   hydroCHLOROthiazide (HYDRODIURIL) 25 mg tablet TAKE 1 TABLET BY MOUTH EVERY DAY 2/1/23  Yes Bebeto Brown MD   celecoxib (CELEBREX) 200 mg capsule TAKE ONE CAPSULE BY MOUTH DAILY 1/31/23  Yes Bebeto Brown MD   atorvastatin (LIPITOR) 10 mg tablet TAKE 1 TABLET BY MOUTH EVERY DAY 5/23/22  Yes Bebeto Brown MD   tamsulosin [facility]) 0.4 mg capsule  11/23/21  Yes Provider, Historical   varicella-zoster recombinant, PF, (SHINGRIX) 50 mcg/0.5 mL susr injection 0.5 mL IM once now and then repeat in 2-6 months  Patient not taking: No sig reported 2/7/22 2/22/23  Bebeto Brown MD   amoxicillin (AMOXIL) 500 mg capsule TAKE 4 CAPSULES BY MOUTH 1 HOUR PRIOR TO DENTAL APPT  Patient not taking: No sig reported 8/1/19   Provider, Historical          Review of Systems   Constitutional:  Negative for chills and fever. Respiratory:  Negative for cough and shortness of breath. Cardiovascular:  Negative for chest pain and palpitations. Gastrointestinal:  Negative for abdominal pain, blood in stool, constipation, diarrhea, heartburn, nausea and vomiting.    Genitourinary:         He reports: nocturia x 3, weak stream. Musculoskeletal:  Negative for joint pain and myalgias. Neurological:  Negative for tingling, focal weakness and headaches. Endo/Heme/Allergies:  Does not bruise/bleed easily. Psychiatric/Behavioral:  Negative for depression. The patient is not nervous/anxious and does not have insomnia. Objective:   Physical Exam  Constitutional:       General: He is not in acute distress. Appearance: Normal appearance. Eyes:      Conjunctiva/sclera: Conjunctivae normal.   Cardiovascular:      Rate and Rhythm: Regular rhythm. Heart sounds: No murmur heard. Pulmonary:      Effort: Pulmonary effort is normal.      Breath sounds: Normal breath sounds. No decreased breath sounds or wheezing. Abdominal:      General: Bowel sounds are normal.      Palpations: Abdomen is soft. Tenderness: There is no abdominal tenderness. Musculoskeletal:      Right lower leg: No edema. Left lower leg: No edema.    Psychiatric:         Mood and Affect: Mood and affect normal.        Visit Vitals  /72   Pulse 71   Temp 98.2 °F (36.8 °C) (Temporal)   Resp 18   Ht 6' 2\" (1.88 m)   Wt 165 lb 9.6 oz (75.1 kg)   SpO2 96%   BMI 21.26 kg/m²       Amina Stevens MD

## 2023-02-22 NOTE — ASSESSMENT & PLAN NOTE
Stable, he is happy with control, monitored by specialist. No acute findings meriting change in the plan

## 2023-02-22 NOTE — ACP (ADVANCE CARE PLANNING)
Advance Care Planning   Advance Care Planning (ACP) Physician/NP/PA (Provider) Conversation    Date of ACP Conversation: 02/22/23  Persons included in Conversation:  patient  Length of ACP Conversation in minutes: <16 minutes (Non-Billable)    Authorized Decision Maker (if patient is incapable of making informed decisions):   Named in Advance Directive or Healthcare Power of       Primary Decision Maker: Chivo Novak - Spouse - [phone]    He has an advanced directive - a copy has been provided & still reflects him wishes. Reviewed DNR/DNI and patient is not interested- elects Full Code (attempt resuscitation).        Amina Stevens MD

## 2023-03-30 DIAGNOSIS — Z96.651 STATUS POST TOTAL KNEE REPLACEMENT, RIGHT: Primary | ICD-10-CM

## 2023-03-30 RX ORDER — AMOXICILLIN 500 MG/1
CAPSULE ORAL
Qty: 16 CAPSULE | Refills: 1 | Status: SHIPPED | OUTPATIENT
Start: 2023-03-30

## 2023-05-19 DIAGNOSIS — E78.00 PURE HYPERCHOLESTEROLEMIA, UNSPECIFIED: Primary | ICD-10-CM

## 2023-05-19 RX ORDER — ATORVASTATIN CALCIUM 10 MG/1
TABLET, FILM COATED ORAL
Qty: 90 TABLET | Refills: 3 | Status: SHIPPED | OUTPATIENT
Start: 2023-05-19

## 2023-08-03 DIAGNOSIS — M15.9 OSTEOARTHRITIS OF MULTIPLE JOINTS, UNSPECIFIED OSTEOARTHRITIS TYPE: Primary | ICD-10-CM

## 2023-08-04 RX ORDER — CELECOXIB 200 MG/1
CAPSULE ORAL
Qty: 90 CAPSULE | Refills: 1 | Status: SHIPPED | OUTPATIENT
Start: 2023-08-04

## 2023-09-03 SDOH — ECONOMIC STABILITY: TRANSPORTATION INSECURITY
IN THE PAST 12 MONTHS, HAS LACK OF TRANSPORTATION KEPT YOU FROM MEETINGS, WORK, OR FROM GETTING THINGS NEEDED FOR DAILY LIVING?: NO

## 2023-09-03 SDOH — ECONOMIC STABILITY: INCOME INSECURITY: HOW HARD IS IT FOR YOU TO PAY FOR THE VERY BASICS LIKE FOOD, HOUSING, MEDICAL CARE, AND HEATING?: NOT VERY HARD

## 2023-09-03 SDOH — ECONOMIC STABILITY: FOOD INSECURITY: WITHIN THE PAST 12 MONTHS, YOU WORRIED THAT YOUR FOOD WOULD RUN OUT BEFORE YOU GOT MONEY TO BUY MORE.: NEVER TRUE

## 2023-09-03 SDOH — ECONOMIC STABILITY: HOUSING INSECURITY
IN THE LAST 12 MONTHS, WAS THERE A TIME WHEN YOU DID NOT HAVE A STEADY PLACE TO SLEEP OR SLEPT IN A SHELTER (INCLUDING NOW)?: NO

## 2023-09-03 SDOH — ECONOMIC STABILITY: FOOD INSECURITY: WITHIN THE PAST 12 MONTHS, THE FOOD YOU BOUGHT JUST DIDN'T LAST AND YOU DIDN'T HAVE MONEY TO GET MORE.: NEVER TRUE

## 2023-09-06 ENCOUNTER — OFFICE VISIT (OUTPATIENT)
Age: 78
End: 2023-09-06
Payer: MEDICARE

## 2023-09-06 VITALS
HEART RATE: 103 BPM | TEMPERATURE: 98.6 F | BODY MASS INDEX: 20.95 KG/M2 | OXYGEN SATURATION: 95 % | WEIGHT: 163.2 LBS | DIASTOLIC BLOOD PRESSURE: 76 MMHG | HEIGHT: 74 IN | RESPIRATION RATE: 16 BRPM | SYSTOLIC BLOOD PRESSURE: 120 MMHG

## 2023-09-06 DIAGNOSIS — E78.00 PURE HYPERCHOLESTEROLEMIA: ICD-10-CM

## 2023-09-06 DIAGNOSIS — M15.9 GENERALIZED OSTEOARTHRITIS: ICD-10-CM

## 2023-09-06 DIAGNOSIS — I10 HYPERTENSION, ESSENTIAL: ICD-10-CM

## 2023-09-06 DIAGNOSIS — I10 HYPERTENSION, ESSENTIAL: Primary | ICD-10-CM

## 2023-09-06 DIAGNOSIS — M54.2 NECK PAIN ON LEFT SIDE: ICD-10-CM

## 2023-09-06 DIAGNOSIS — R73.03 PRE-DIABETES: ICD-10-CM

## 2023-09-06 PROCEDURE — 99214 OFFICE O/P EST MOD 30 MIN: CPT | Performed by: INTERNAL MEDICINE

## 2023-09-06 PROCEDURE — G8427 DOCREV CUR MEDS BY ELIG CLIN: HCPCS | Performed by: INTERNAL MEDICINE

## 2023-09-06 PROCEDURE — 3074F SYST BP LT 130 MM HG: CPT | Performed by: INTERNAL MEDICINE

## 2023-09-06 PROCEDURE — G8420 CALC BMI NORM PARAMETERS: HCPCS | Performed by: INTERNAL MEDICINE

## 2023-09-06 PROCEDURE — 3078F DIAST BP <80 MM HG: CPT | Performed by: INTERNAL MEDICINE

## 2023-09-06 PROCEDURE — 1036F TOBACCO NON-USER: CPT | Performed by: INTERNAL MEDICINE

## 2023-09-06 PROCEDURE — 1123F ACP DISCUSS/DSCN MKR DOCD: CPT | Performed by: INTERNAL MEDICINE

## 2023-09-06 RX ORDER — HYDROCHLOROTHIAZIDE 12.5 MG/1
12.5 TABLET ORAL DAILY
Qty: 90 TABLET | Refills: 3 | Status: SHIPPED | OUTPATIENT
Start: 2023-09-06

## 2023-09-06 RX ORDER — FINASTERIDE 5 MG/1
5 TABLET, FILM COATED ORAL DAILY
COMMUNITY
Start: 2023-06-08

## 2023-09-06 SDOH — ECONOMIC STABILITY: FOOD INSECURITY: WITHIN THE PAST 12 MONTHS, YOU WORRIED THAT YOUR FOOD WOULD RUN OUT BEFORE YOU GOT MONEY TO BUY MORE.: NEVER TRUE

## 2023-09-06 SDOH — ECONOMIC STABILITY: FOOD INSECURITY: WITHIN THE PAST 12 MONTHS, THE FOOD YOU BOUGHT JUST DIDN'T LAST AND YOU DIDN'T HAVE MONEY TO GET MORE.: NEVER TRUE

## 2023-09-06 SDOH — ECONOMIC STABILITY: INCOME INSECURITY: HOW HARD IS IT FOR YOU TO PAY FOR THE VERY BASICS LIKE FOOD, HOUSING, MEDICAL CARE, AND HEATING?: NOT HARD AT ALL

## 2023-09-06 ASSESSMENT — PATIENT HEALTH QUESTIONNAIRE - PHQ9
SUM OF ALL RESPONSES TO PHQ9 QUESTIONS 1 & 2: 0
SUM OF ALL RESPONSES TO PHQ QUESTIONS 1-9: 0
1. LITTLE INTEREST OR PLEASURE IN DOING THINGS: 0
2. FEELING DOWN, DEPRESSED OR HOPELESS: 0

## 2023-09-06 ASSESSMENT — ENCOUNTER SYMPTOMS
SHORTNESS OF BREATH: 0
DIARRHEA: 0
BLOOD IN STOOL: 0
NAUSEA: 0
VOMITING: 0
ABDOMINAL PAIN: 0
COUGH: 0
CONSTIPATION: 0

## 2023-09-06 NOTE — PROGRESS NOTES
Ongoing SW/CM Assessment/Plan of Care Note     See SW/CM flowsheets for goals and other objective data. Patient/Family discharge goal (s):  Goal #1: Psychosocial needs assessed          PT Recommendation:  Recommendation for Discharge: PT: Home, Home therapy    OT Recommendation:  Recommendations for Discharge: OT: Home    SLP Recommendation:  Recommendations for Discharge: SLP: post IRP therapy may be indicated    Disposition:       Progress note: Attended weekly progress update with rehab team.  Pt continues to make progress with therapy. Medical issues resolving. Tentative discharge date remains set for Tuesday, 3/27. Team recommending outpatient PT, OT and SLP. SW also following for outpatient dialysis arrangements. Spoke with [facility] with [facility] intake, they will need Hep B Core lab results. Lab/results not in epic, spoke with nephrology who will place order for such. Also spoke with Liz Pierce RN at [facility] ([phone], [phone] fax). Answered her questions as able and faxed recent pulmonary note per her request.  Liz Pierce indicates that their MD (Dr. Chayo Alejandre) requires MD to MD since pt is JORGE L. Dr. Chayo Alejandre can be paged at [phone]. SW has updated Dr. Phil Magdaleno who plans follow up. Attempted to meet with pt to provide update, pt off unit for dialysis. Follow up planned. Soham Kessler is a 66 y.o. male who was seen in clinic today (9/6/2023). Assessment & Plan:   Below is the assessment and plan developed based on review of pertinent history, physical exam, labs, studies, and medications. 1. Hypertension, essential  Assessment & Plan:  Improved on lower dose of HCTZ, no changes pending review of labs   Orders:  -     hydroCHLOROthiazide (HYDRODIURIL) 12.5 MG tablet; Take 1 tablet by mouth daily, Disp-90 tablet, R-3Normal  -     Comprehensive Metabolic Panel; Future  -     CBC; Future  2. Pure hypercholesterolemia  Assessment & Plan:  at goal, continue current treatment pending review of labs    Orders:  -     Comprehensive Metabolic Panel; Future  -     Lipid Panel; Future  3. Pre-diabetes  Assessment & Plan:  Likely stable, asymptomatic, no changes pending review of labs   Orders:  -     Comprehensive Metabolic Panel; Future  -     Hemoglobin A1C; Future  4. Generalized osteoarthritis  Assessment & Plan:  stable, continue current treatment (diclofenac daily)   5. Neck pain on left side  Comments:  new dx, differential reviewed, favor muscular or strain. Doubt DDD. Will start with heat, HEP, and seeing PT. Red flags & expectations reviewed  Orders:  [facility] - [facility] at [facility]     Return in about 6 months (around 3/6/2024) for FULL PHYSICAL. Subjective/Objective: Veena Soliman was seen today for Blood Pressure Check and Medication Check     Since last visit: weight has increased. Cardiovascular Review  The patient has hypertension and hyperlipidemia. He reports taking medications as instructed- he has been taking 1/2 tab of HCTZ for the last 3 months, no medication side effects noted, patient does not perform home BP monitoring. He generally follows a low fat low cholesterol diet, generally follows a low sodium diet. Endocrine Review  He is seen for pre-diabetes. Diabetic diet compliance: compliant all of the time.   Lab review:

## 2023-09-08 DIAGNOSIS — R73.03 PRE-DIABETES: ICD-10-CM

## 2023-09-08 LAB
ALBUMIN SERPL-MCNC: 4 G/DL (ref 3.5–5)
ALBUMIN/GLOB SERPL: 1.3 (ref 1.1–2.2)
ALP SERPL-CCNC: 87 U/L (ref 45–117)
ALT SERPL-CCNC: 16 U/L (ref 12–78)
ANION GAP SERPL CALC-SCNC: 5 MMOL/L (ref 5–15)
AST SERPL-CCNC: 9 U/L (ref 15–37)
BILIRUB SERPL-MCNC: 1 MG/DL (ref 0.2–1)
BUN SERPL-MCNC: 24 MG/DL (ref 6–20)
BUN/CREAT SERPL: 24 (ref 12–20)
CALCIUM SERPL-MCNC: 9.2 MG/DL (ref 8.5–10.1)
CHLORIDE SERPL-SCNC: 106 MMOL/L (ref 97–108)
CHOLEST SERPL-MCNC: 165 MG/DL
CO2 SERPL-SCNC: 31 MMOL/L (ref 21–32)
CREAT SERPL-MCNC: 1 MG/DL (ref 0.7–1.3)
ERYTHROCYTE [DISTWIDTH] IN BLOOD BY AUTOMATED COUNT: 12.9 % (ref 11.5–14.5)
EST. AVERAGE GLUCOSE BLD GHB EST-MCNC: 120 MG/DL
GLOBULIN SER CALC-MCNC: 3.1 G/DL (ref 2–4)
GLUCOSE SERPL-MCNC: 120 MG/DL (ref 65–100)
HBA1C MFR BLD: 5.8 % (ref 4–5.6)
HCT VFR BLD AUTO: 46.8 % (ref 36.6–50.3)
HDLC SERPL-MCNC: 72 MG/DL
HDLC SERPL: 2.3 (ref 0–5)
HGB BLD-MCNC: 15.5 G/DL (ref 12.1–17)
LDLC SERPL CALC-MCNC: 75.8 MG/DL (ref 0–100)
MCH RBC QN AUTO: 31.7 PG (ref 26–34)
MCHC RBC AUTO-ENTMCNC: 33.1 G/DL (ref 30–36.5)
MCV RBC AUTO: 95.7 FL (ref 80–99)
NRBC # BLD: 0 K/UL (ref 0–0.01)
NRBC BLD-RTO: 0 PER 100 WBC
PLATELET # BLD AUTO: 260 K/UL (ref 150–400)
PMV BLD AUTO: 9.5 FL (ref 8.9–12.9)
POTASSIUM SERPL-SCNC: 4.1 MMOL/L (ref 3.5–5.1)
PROT SERPL-MCNC: 7.1 G/DL (ref 6.4–8.2)
RBC # BLD AUTO: 4.89 M/UL (ref 4.1–5.7)
SODIUM SERPL-SCNC: 142 MMOL/L (ref 136–145)
TRIGL SERPL-MCNC: 86 MG/DL
VLDLC SERPL CALC-MCNC: 17.2 MG/DL
WBC # BLD AUTO: 9.9 K/UL (ref 4.1–11.1)

## 2023-09-19 ENCOUNTER — HOSPITAL ENCOUNTER (OUTPATIENT)
Facility: HOSPITAL | Age: 78
Setting detail: RECURRING SERIES
Discharge: HOME | End: 2023-09-22
Payer: MEDICARE

## 2023-09-19 PROCEDURE — 97161 PT EVAL LOW COMPLEX 20 MIN: CPT

## 2023-09-19 PROCEDURE — 97140 MANUAL THERAPY 1/> REGIONS: CPT

## 2023-09-19 NOTE — THERAPY EVALUATION
head  Work Hx: retired  Living Situation: w/ wife  Mobility: I  Self Care: I  Previous Treatment/Compliance: good  PMHx/Surgical Hx/Comorbidites: see chart  Prior Hospitalization:  see chart  Barriers: []pain []Financial []time []transportation []Other:  Substance use: []Alcohol []Tobacco []other:   Pt Goals: \"decrease pain\"  Motivation: good  Cognition: A & O x 4               OBJECTIVE    Posture:  forward head, rounded shoulders  Other Observations:  large pimple (according to pt) at L C1-2  Gait and Functional Mobility:  normal  Palpation: TTP over L cervical paraspinals, suboccipitals, C3-5 L rotation    Cervical ROM: flexion 30 deg w/ pain, ext 5 deg w/ pain, L rotation 15 deg w/ pain, R rotation 50 deg w/ tightness  UE ROM: WNL w/o neck pain      UPPER QUARTER   MUSCLE STRENGTH  KEY       R  L  0 - No Contraction  C1, C2 Neck Flex 4  4  1 - Trace   C3 Side Flex  4  4  2 - Poor   C4 Sh Elev  5  5  3 - Fair    C5 Deltoid/Biceps 4  4  4 - Good   C6 Wrist Ext  5  5  5 - Normal   C7 Triceps  5  5      C8 Thumb Ext  5  5      T1 Hand Inst  5  5          MMT: no pain  Neurological: Reflexes / Sensations: normal  Special Tests: - compression, - distraction, - spurlings          Objective/Functional Outcome Measure:   FOTO Score: 49  FOTO score = an established functional score where 100 = no disability      15 min [x]Eval - untimed                        Therapeutic Procedures: Tx Min Billable or 1:1 Min (if diff from Tx Min) Procedure, Rationale, Specifics   5  87207 Therapeutic Exercise (timed):  increase ROM, strength, coordination, balance, and proprioception to improve patient's ability to progress to PLOF and address remaining functional goals.  (see flow sheet as applicable)    Details if applicable:  educated on tissues at fault, heat, posture   10  21336 Manual Therapy (timed):  decrease pain, increase ROM, and increase tissue extensibility to improve patient's ability to progress to PLOF and address

## 2023-09-27 ENCOUNTER — HOSPITAL ENCOUNTER (OUTPATIENT)
Facility: HOSPITAL | Age: 78
Setting detail: RECURRING SERIES
Discharge: HOME | End: 2023-09-30
Payer: MEDICARE

## 2023-09-27 PROCEDURE — 97140 MANUAL THERAPY 1/> REGIONS: CPT

## 2023-09-27 PROCEDURE — 97110 THERAPEUTIC EXERCISES: CPT

## 2023-09-27 NOTE — PROGRESS NOTES
applicable)     Details if applicable:  passive cervical distraction, SB and rotation, PAVM, PIVM to C3-4, P/A mobs to L C3-4, STM to suboccipitals, levator scap, upper trap and cervical paraspinals   40     Total Total         Modalities Rationale:     decrease pain, increase tissue extensibility, and increase muscle contraction/control to improve patient's ability to progress to PLOF and address remaining functional goals. min [] Estim Unattended,             type/location:       []  w/ice    []  w/heat        min [] Estim Attended,             type/location:       []  w/ice   []  w/heat         []  w/US   []  TENS insruct            min []  Mechanical Traction,        type/lbs:        []  pro      []  sup           []  int       []  cont            []  before manual           []  after manual     min []  Ultrasound,         settings/location:     10 min  unbilled []  Ice     [x]  Heat            location/position: Supine, cervical        min []  Vasopneumatic Device,      press/temp:   pre-treatment girth :    post-treatment girth :    measured at (landmark       location) : If using vaso (only need to measure limb vaso being performed on)        min []  Other:      Skin assessment post-treatment (if applicable):    [x]  intact    []  redness- no adverse reaction                 []redness - adverse reaction:          [x]  Patient Education billed concurrently with other procedures   [x] Review HEP    [] Progressed/Changed HEP, detail:    [] Other detail:         Other Objective/Functional Measures  NT    Pain Level at end of session (0-10 scale): 0      Assessment   Pt demonstrates much improved cervical rotation after manual techniques. Pt needed verbal cueing in order to perform supine chin tuck properly.   Patient will continue to benefit from skilled PT / OT services to modify and progress therapeutic interventions, analyze and address functional mobility deficits, analyze and address ROM deficits,

## 2023-09-29 ENCOUNTER — HOSPITAL ENCOUNTER (OUTPATIENT)
Facility: HOSPITAL | Age: 78
Setting detail: RECURRING SERIES
End: 2023-09-29
Payer: MEDICARE

## 2023-09-29 PROCEDURE — 97110 THERAPEUTIC EXERCISES: CPT

## 2023-09-29 PROCEDURE — 97140 MANUAL THERAPY 1/> REGIONS: CPT

## 2023-09-29 NOTE — PROGRESS NOTES
PHYSICAL THERAPY - MEDICARE DAILY TREATMENT NOTE (updated 3/23)      Date: 2023          Patient Name:  Michael Almonte :  1945   Medical   Diagnosis:  Neck pain on left side [M54.2] Treatment Diagnosis:  M54.2, G89.29  CHRONIC NECK PAIN    Referral Source:  Rina Gibson MD Insurance:   Payor: MEDICARE / Plan: MEDICARE PART A AND B / Product Type: *No Product type* /                     Patient  verified yes     Visit #   Current  / Total 2 Med nec   Time   In / Out 4:30 pm 5:20   Total Treatment Time 50   Total Timed Codes 40   1:1 Treatment Time 40       BC Totals Reminder:  bill using total billable   min of TIMED therapeutic procedures and modalities. 8-22 min = 1 unit; 23-37 min = 2 units; 38-52 min = 3 units; 53-67 min = 4 units; 68-82 min = 5 units            SUBJECTIVE    Pain Level (0-10 scale): better    Any medication changes, allergies to medications, adverse drug reactions, diagnosis change, or new procedure performed?: [x] No    [] Yes (see summary sheet for update)  Medications: Verified on Patient Summary List    Subjective functional status/changes:     Pt states he is doing better and having less clicking and popping in his neck. OBJECTIVE      Therapeutic Procedures: Tx Min Billable or 1:1 Min (if diff from Tx Min) Procedure, Rationale, Specifics   15  86040 Therapeutic Exercise (timed):  increase ROM, strength, coordination, balance, and proprioception to improve patient's ability to progress to PLOF and address remaining functional goals. (see flow sheet as applicable)     Details if applicable:     25  23856 Manual Therapy (timed):  decrease pain, increase ROM, increase tissue extensibility, and decrease trigger points to improve patient's ability to progress to PLOF and address remaining functional goals. The manual therapy interventions were performed at a separate and distinct time from the therapeutic activities interventions .  (see flow sheet as

## 2023-10-02 ENCOUNTER — HOSPITAL ENCOUNTER (OUTPATIENT)
Facility: HOSPITAL | Age: 78
Setting detail: RECURRING SERIES
Discharge: HOME | End: 2023-10-05
Payer: MEDICARE

## 2023-10-02 PROCEDURE — 97140 MANUAL THERAPY 1/> REGIONS: CPT

## 2023-10-02 NOTE — PROGRESS NOTES
PHYSICAL THERAPY - MEDICARE DAILY TREATMENT NOTE (updated 3/23)      Date: 10/2/2023          Patient Name:  Faustino Saeed :  1945   Medical   Diagnosis:  Neck pain on left side [M54.2] Treatment Diagnosis:  M54.2, G89.29  CHRONIC NECK PAIN    Referral Source:  Calixto Zuñiga MD Insurance:   Payor: MEDICARE / Plan: MEDICARE PART A AND B / Product Type: *No Product type* /                     Patient  verified yes     Visit #   Current  / Total 4 Med nec   Time   In / Out 12:30 Pm 1:15 Pm   Total Treatment Time 45   Total Timed Codes 35   1:1 Treatment Time 15      [facility] Totals Reminder:  bill using total billable   min of TIMED therapeutic procedures and modalities. 8-22 min = 1 unit; 23-37 min = 2 units; 38-52 min = 3 units; 53-67 min = 4 units; 68-82 min = 5 units            SUBJECTIVE    Pain Level (0-10 scale): 3/10    Any medication changes, allergies to medications, adverse drug reactions, diagnosis change, or new procedure performed?: [x] No    [] Yes (see summary sheet for update)  Medications: Verified on Patient Summary List    Subjective functional status/changes:     States pain levels depend on what he is doing, greatest discomfort with L cervical rotation. OBJECTIVE      Therapeutic Procedures: Tx Min Billable or 1:1 Min (if diff from Tx Min) Procedure, Rationale, Specifics   20  47987 Therapeutic Exercise (timed):  increase ROM, strength, coordination, balance, and proprioception to improve patient's ability to progress to PLOF and address remaining functional goals. (see flow sheet as applicable)     Details if applicable:     15 15 12504 Manual Therapy (timed):  decrease pain, increase ROM, increase tissue extensibility, and decrease trigger points to improve patient's ability to progress to PLOF and address remaining functional goals. The manual therapy interventions were performed at a separate and distinct time from the therapeutic activities interventions .  (see

## 2023-10-06 ENCOUNTER — HOSPITAL ENCOUNTER (OUTPATIENT)
Facility: HOSPITAL | Age: 78
Setting detail: RECURRING SERIES
Discharge: HOME | End: 2023-10-09
Payer: MEDICARE

## 2023-10-06 PROCEDURE — 97140 MANUAL THERAPY 1/> REGIONS: CPT

## 2023-10-06 PROCEDURE — 97110 THERAPEUTIC EXERCISES: CPT

## 2023-10-06 NOTE — PROGRESS NOTES
PHYSICAL THERAPY - MEDICARE DAILY TREATMENT NOTE (updated 3/23)      Date: 10/6/2023          Patient Name:  Maryla Moritz :  1945   Medical   Diagnosis:  Neck pain on left side [M54.2] Treatment Diagnosis:  M54.2, G89.29  CHRONIC NECK PAIN    Referral Source:  Cristina Ureña MD Insurance:   Payor: MEDICARE / Plan: MEDICARE PART A AND B / Product Type: *No Product type* /                     Patient  verified yes     Visit #   Current  / Total 5 Med nec   Time   In / Out 11:05 am 11:50 am   Total Treatment Time 45   Total Timed Codes 35   1:1 Treatment Time 30      [facility] Totals Reminder:  bill using total billable   min of TIMED therapeutic procedures and modalities. 8-22 min = 1 unit; 23-37 min = 2 units; 38-52 min = 3 units; 53-67 min = 4 units; 68-82 min = 5 units            SUBJECTIVE    Pain Level (0-10 scale): 0/10    Any medication changes, allergies to medications, adverse drug reactions, diagnosis change, or new procedure performed?: [x] No    [] Yes (see summary sheet for update)  Medications: Verified on Patient Summary List    Subjective functional status/changes:     Pt reports he doesn't think his problem is coming from his neck. \"It only hurts if I do something stupid. \" Cervical flexion currently causing pain. OBJECTIVE      Therapeutic Procedures: Tx Min Billable or 1:1 Min (if diff from Tx Min) Procedure, Rationale, Specifics   20 15 00157 Therapeutic Exercise (timed):  increase ROM, strength, coordination, balance, and proprioception to improve patient's ability to progress to PLOF and address remaining functional goals. (see flow sheet as applicable)     Details if applicable:     15 15 81359 Manual Therapy (timed):  decrease pain, increase ROM, increase tissue extensibility, and decrease trigger points to improve patient's ability to progress to PLOF and address remaining functional goals.   The manual therapy interventions were performed at a separate and distinct

## 2023-10-09 ENCOUNTER — HOSPITAL ENCOUNTER (OUTPATIENT)
Facility: HOSPITAL | Age: 78
Setting detail: RECURRING SERIES
Discharge: HOME | End: 2023-10-12
Payer: MEDICARE

## 2023-10-09 PROCEDURE — 97140 MANUAL THERAPY 1/> REGIONS: CPT

## 2023-10-09 PROCEDURE — 97110 THERAPEUTIC EXERCISES: CPT

## 2023-10-09 NOTE — PROGRESS NOTES
end of session (0-10 scale): 0/10      Assessment   The Pt was unable to participate in any exercises that utilized his R shoulder due to the recent shoulder pain. He was able to do his neck exercises without any increased pain and improved L cervical rotation and cervical flexion at the end of the session. Patient will continue to benefit from skilled PT / OT services to modify and progress therapeutic interventions, analyze and address functional mobility deficits, analyze and address ROM deficits, analyze and address strength deficits, analyze and address soft tissue restrictions, analyze and cue for proper movement patterns, analyze and modify for postural abnormalities, and instruct in home and community integration to address functional deficits and attain remaining goals.     Progress toward goals / Updated goals:  []  See Progress Note/Recertification    The Pt is progressing well towards his goals      PLAN  Yes  Continue plan of care  Re-Cert Due: 71/70/42  [x]  Upgrade activities as tolerated  []  Discharge due to :  []  Other:      Rupa Bonilla, PT       10/9/2023       1:36 PM

## 2023-10-11 ENCOUNTER — HOSPITAL ENCOUNTER (OUTPATIENT)
Facility: HOSPITAL | Age: 78
Setting detail: RECURRING SERIES
Discharge: HOME | End: 2023-10-14
Payer: MEDICARE

## 2023-10-11 PROCEDURE — 97140 MANUAL THERAPY 1/> REGIONS: CPT

## 2023-10-11 NOTE — PROGRESS NOTES
PHYSICAL THERAPY - MEDICARE DAILY TREATMENT NOTE (updated 3/23)      Date: 10/11/2023          Patient Name:  Katie Neil :  1945   Medical   Diagnosis:  Neck pain on left side [M54.2] Treatment Diagnosis:  M54.2, G89.29  CHRONIC NECK PAIN    Referral Source:  Shakira Gipson MD Insurance:   Payor: MEDICARE / Plan: MEDICARE PART A AND B / Product Type: *No Product type* /                     Patient  verified yes     Visit #   Current  / Total 7 Med Nec   Time   In / Out 1:00 PM 1:40 PM   Total Treatment Time 40 minutes   Total Timed Codes 30 minutes   1:1 Treatment Time 30 minutes      [facility] Totals Reminder:  bill using total billable   min of TIMED therapeutic procedures and modalities. 8-22 min = 1 unit; 23-37 min = 2 units; 38-52 min = 3 units; 53-67 min = 4 units; 68-82 min = 5 units            SUBJECTIVE    Pain Level (0-10 scale): 0/10 at rest, 8/10 with movement    Any medication changes, allergies to medications, adverse drug reactions, diagnosis change, or new procedure performed?: [x] No    [] Yes (see summary sheet for update)  Medications: Verified on Patient Summary List    Subjective functional status/changes: The patient states continued L sided neck pain with movement but denies any pain at rest. He continues to have R shoulder pain and would like to continue to hold from UE exercises. \"It feels like I just strained a muscle in my R arm. \"    OBJECTIVE      Therapeutic Procedures: Tx Min Billable or 1:1 Min (if diff from Tx Min) Procedure, Rationale, Specifics   5  35485 Therapeutic Exercise (timed):  increase ROM, strength, coordination, balance, and proprioception to improve patient's ability to progress to PLOF and address remaining functional goals.  (see flow sheet as applicable)     Details if applicable:     25  63050 Manual Therapy (timed):  decrease pain, increase ROM, increase tissue extensibility, decrease trigger points, and increase postural awareness to

## 2023-10-16 ENCOUNTER — HOSPITAL ENCOUNTER (OUTPATIENT)
Facility: HOSPITAL | Age: 78
Setting detail: RECURRING SERIES
Discharge: HOME | End: 2023-10-19
Payer: MEDICARE

## 2023-10-16 PROCEDURE — 97140 MANUAL THERAPY 1/> REGIONS: CPT

## 2023-10-16 PROCEDURE — 97110 THERAPEUTIC EXERCISES: CPT

## 2023-10-16 NOTE — PROGRESS NOTES
PHYSICAL THERAPY - MEDICARE DAILY TREATMENT NOTE (updated 3/23)      Date: 10/16/2023          Patient Name:  Ivet Márquez :  1945   Medical   Diagnosis:  Neck pain on left side [M54.2] Treatment Diagnosis:  M54.2, G89.29  CHRONIC NECK PAIN    Referral Source:  Sanjana Roman MD Insurance:   Payor: MEDICARE / Plan: MEDICARE PART A AND B / Product Type: *No Product type* /                     Patient  verified yes     Visit #   Current  / Total 8 Med Nec   Time   In / Out 12:55 PM 1:40 PM   Total Treatment Time 45 minutes   Total Timed Codes 35 minutes   1:1 Treatment Time 35 minutes      [facility] Totals Reminder:  bill using total billable   min of TIMED therapeutic procedures and modalities. 8-22 min = 1 unit; 23-37 min = 2 units; 38-52 min = 3 units; 53-67 min = 4 units; 68-82 min = 5 units            SUBJECTIVE    Pain Level (0-10 scale): 0/10 at rest, increased with movement    Any medication changes, allergies to medications, adverse drug reactions, diagnosis change, or new procedure performed?: [x] No    [] Yes (see summary sheet for update)  Medications: Verified on Patient Summary List    Subjective functional status/changes: The patient reports his arm is still bothering him today. Will try to play tennis Wednesday and try to do more UE exercises in PT next visit. OBJECTIVE      Therapeutic Procedures: Tx Min Billable or 1:1 Min (if diff from Tx Min) Procedure, Rationale, Specifics   10  50478 Therapeutic Exercise (timed):  increase ROM, strength, coordination, balance, and proprioception to improve patient's ability to progress to PLOF and address remaining functional goals. (see flow sheet as applicable)     Details if applicable:     39 44181 Manual Therapy (timed):  decrease pain, increase ROM, increase tissue extensibility, decrease trigger points, and increase postural awareness to improve patient's ability to progress to PLOF and address remaining functional goals.

## 2023-10-18 ENCOUNTER — HOSPITAL ENCOUNTER (OUTPATIENT)
Facility: HOSPITAL | Age: 78
Setting detail: RECURRING SERIES
End: 2023-10-18
Payer: MEDICARE

## 2023-10-23 ENCOUNTER — HOSPITAL ENCOUNTER (OUTPATIENT)
Facility: HOSPITAL | Age: 78
Setting detail: RECURRING SERIES
Discharge: HOME | End: 2023-10-26
Payer: MEDICARE

## 2023-10-23 PROCEDURE — 97110 THERAPEUTIC EXERCISES: CPT

## 2023-10-23 PROCEDURE — 97140 MANUAL THERAPY 1/> REGIONS: CPT

## 2023-10-23 NOTE — PROGRESS NOTES
PHYSICAL THERAPY - MEDICARE DAILY TREATMENT/Progress NOTE (updated 3/23)      Date: 10/23/2023          Patient Name:  Barrington Grover :  1945   Medical   Diagnosis:  Neck pain on left side [M54.2] Treatment Diagnosis:  M54.2, G89.29  CHRONIC NECK PAIN    Referral Source:  Christi Moritz, MD Insurance:   Payor: MEDICARE / Plan: MEDICARE PART A AND B / Product Type: *No Product type* /                     Patient  verified yes     Visit #   Current  / Total 9 Med Nec   Time   In / Out 12:30 PM 1:15 PM   Total Treatment Time 45    Total Timed Codes 35    1:1 Treatment Time 30      [facility] Totals Reminder:  bill using total billable   min of TIMED therapeutic procedures and modalities. 8-22 min = 1 unit; 23-37 min = 2 units; 38-52 min = 3 units; 53-67 min = 4 units; 68-82 min = 5 units            SUBJECTIVE    Pain Level (0-10 scale): 0/10    Any medication changes, allergies to medications, adverse drug reactions, diagnosis change, or new procedure performed?: [x] No    [] Yes (see summary sheet for update)  Medications: Verified on Patient Summary List    Subjective functional status/changes:     Pt states he is doing better overall w/ less pain and less clicking. Pt continues to be stiff and have pain when getting out of bed in the morning. OBJECTIVE      Therapeutic Procedures: Tx Min Billable or 1:1 Min (if diff from Tx Min) Procedure, Rationale, Specifics   20 15 12884 Therapeutic Exercise (timed):  increase ROM, strength, coordination, balance, and proprioception to improve patient's ability to progress to PLOF and address remaining functional goals. (see flow sheet as applicable)     Details if applicable:     15 15 97448 Manual Therapy (timed):  decrease pain, increase ROM, increase tissue extensibility, decrease trigger points, and increase postural awareness to improve patient's ability to progress to PLOF and address remaining functional goals.   The manual therapy interventions were

## 2023-10-25 ENCOUNTER — HOSPITAL ENCOUNTER (OUTPATIENT)
Facility: HOSPITAL | Age: 78
Setting detail: RECURRING SERIES
Discharge: HOME | End: 2023-10-28
Payer: MEDICARE

## 2023-10-25 PROCEDURE — 97110 THERAPEUTIC EXERCISES: CPT

## 2023-10-25 PROCEDURE — 97140 MANUAL THERAPY 1/> REGIONS: CPT

## 2023-10-25 NOTE — PROGRESS NOTES
PHYSICAL THERAPY - MEDICARE DAILY TREATMENT/Progress NOTE (updated 3/23)      Date: 10/25/2023          Patient Name:  Ivet Márquez :  1945   Medical   Diagnosis:  Neck pain on left side [M54.2] Treatment Diagnosis:  M54.2, G89.29  CHRONIC NECK PAIN    Referral Source:  Sanjana Roman MD Insurance:   Payor: MEDICARE / Plan: MEDICARE PART A AND B / Product Type: *No Product type* /                     Patient  verified yes     Visit #   Current  / Total 10 Med Nec   Time   In / Out 3:00 PM 3:40 PM   Total Treatment Time 40   Total Timed Codes 30    1:1 Treatment Time 30      [facility] Totals Reminder:  bill using total billable   min of TIMED therapeutic procedures and modalities. 8-22 min = 1 unit; 23-37 min = 2 units; 38-52 min = 3 units; 53-67 min = 4 units; 68-82 min = 5 units            SUBJECTIVE    Pain Level (0-10 scale): 0/10    Any medication changes, allergies to medications, adverse drug reactions, diagnosis change, or new procedure performed?: [x] No    [] Yes (see summary sheet for update)  Medications: Verified on Patient Summary List    Subjective functional status/changes:     Pt states he is doing better. OBJECTIVE      Therapeutic Procedures: Tx Min Billable or 1:1 Min (if diff from Tx Min) Procedure, Rationale, Specifics   15  36098 Therapeutic Exercise (timed):  increase ROM, strength, coordination, balance, and proprioception to improve patient's ability to progress to PLOF and address remaining functional goals. (see flow sheet as applicable)     Details if applicable:     15  72690 Manual Therapy (timed):  decrease pain, increase ROM, increase tissue extensibility, decrease trigger points, and increase postural awareness to improve patient's ability to progress to PLOF and address remaining functional goals. The manual therapy interventions were performed at a separate and distinct time from the therapeutic activities interventions .  (see flow sheet as applicable)

## 2023-11-20 ENCOUNTER — HOSPITAL ENCOUNTER (OUTPATIENT)
Facility: HOSPITAL | Age: 78
Setting detail: RECURRING SERIES
Discharge: HOME | End: 2023-11-23
Payer: MEDICARE

## 2023-11-20 PROCEDURE — 97110 THERAPEUTIC EXERCISES: CPT

## 2023-11-20 PROCEDURE — 97140 MANUAL THERAPY 1/> REGIONS: CPT

## 2023-11-20 NOTE — PROGRESS NOTES
abnormalities, and instruct in home and community integration to address functional deficits and attain remaining goals. Progress toward goals / Updated goals:  []  See Progress Note/Recertification    Short Term Goals: To be accomplished in 8 treatments. Pt will be I w/ HEP in order to take active role in therapy- MET  Pt will apply heat to neck at least 2 x day in order to reduce pain- MET  Pt will avoid reading book for over 30 minutes at one time in order to reduce strain on neck- MET  Long Term Goals: To be accomplished in 24 treatments.   Pt will report over 10 point improvement on FOTO in order to improve function- PROGRESSING  Pt will demonstrate L cervical rotation over 25 deg w/o pain in order to better drive- MET  Pt will be able to sit for over 30 minutes w/o increase in pain in order to better perform ADL's- PROGRESSING      PLAN  Yes  Continue plan of care  Re-Cert Due: 87/26/97  [x]  Upgrade activities as tolerated  []  Discharge due to :  []  Other:      Krystina Warren, PT       11/20/2023       2:32 PM

## 2023-11-22 ENCOUNTER — HOSPITAL ENCOUNTER (OUTPATIENT)
Facility: HOSPITAL | Age: 78
Setting detail: RECURRING SERIES
Discharge: HOME | End: 2023-11-25
Payer: MEDICARE

## 2023-11-22 PROCEDURE — 97110 THERAPEUTIC EXERCISES: CPT

## 2023-11-22 PROCEDURE — 97140 MANUAL THERAPY 1/> REGIONS: CPT

## 2023-11-22 NOTE — PROGRESS NOTES
PHYSICAL THERAPY - MEDICARE DAILY TREATMENT/Progress NOTE (updated 3/23)      Date: 2023          Patient Name:  Rk Arceo :  1945   Medical   Diagnosis:  Neck pain on left side [M54.2] Treatment Diagnosis:  M54.2, G89.29  CHRONIC NECK PAIN    Referral Source:  Harriet Graham MD Insurance:   Payor: MEDICARE / Plan: MEDICARE PART A AND B / Product Type: *No Product type* /                     Patient  verified yes     Visit #   Current  / Total 12 Med Nec   Time   In / Out 2:25 PM 3:10 PM   Total Treatment Time 45   Total Timed Codes 35   1:1 Treatment Time 35      [facility] Totals Reminder:  bill using total billable   min of TIMED therapeutic procedures and modalities. 8-22 min = 1 unit; 23-37 min = 2 units; 38-52 min = 3 units; 53-67 min = 4 units; 68-82 min = 5 units            SUBJECTIVE    Pain Level (0-10 scale): 2/10    Any medication changes, allergies to medications, adverse drug reactions, diagnosis change, or new procedure performed?: [x] No    [] Yes (see summary sheet for update)  Medications: Verified on Patient Summary List    Subjective functional status/changes:     Pt states he is doing ok and felt good after last session. OBJECTIVE      Therapeutic Procedures: Tx Min Billable or 1:1 Min (if diff from Tx Min) Procedure, Rationale, Specifics   20  79362 Therapeutic Exercise (timed):  increase ROM, strength, coordination, balance, and proprioception to improve patient's ability to progress to PLOF and address remaining functional goals. (see flow sheet as applicable)     Details if applicable:     15  26287 Manual Therapy (timed):  decrease pain, increase ROM, increase tissue extensibility, decrease trigger points, and increase postural awareness to improve patient's ability to progress to PLOF and address remaining functional goals. The manual therapy interventions were performed at a separate and distinct time from the therapeutic activities interventions .  (see

## 2023-11-27 ENCOUNTER — HOSPITAL ENCOUNTER (OUTPATIENT)
Facility: HOSPITAL | Age: 78
Setting detail: RECURRING SERIES
Discharge: HOME | End: 2023-11-30
Payer: MEDICARE

## 2023-11-27 PROCEDURE — 97140 MANUAL THERAPY 1/> REGIONS: CPT

## 2023-11-27 PROCEDURE — 97110 THERAPEUTIC EXERCISES: CPT

## 2023-11-27 NOTE — PROGRESS NOTES
PHYSICAL THERAPY - MEDICARE DAILY TREATMENT NOTE (updated 3/23)      Date: 2023          Patient Name:  Naina Humphrey :  1945   Medical   Diagnosis:  Neck pain on left side [M54.2] Treatment Diagnosis:  M54.2, G89.29  CHRONIC NECK PAIN    Referral Source:  Mely Brooks MD Insurance:   Payor: MEDICARE / Plan: MEDICARE PART A AND B / Product Type: *No Product type* /                     Patient  verified yes     Visit #   Current  / Total 13 Med Nec   Time   In / Out 2:30 PM 3:15 PM   Total Treatment Time 45   Total Timed Codes 35   1:1 Treatment Time 35      [facility] Totals Reminder:  bill using total billable   min of TIMED therapeutic procedures and modalities. 8-22 min = 1 unit; 23-37 min = 2 units; 38-52 min = 3 units; 53-67 min = 4 units; 68-82 min = 5 units            SUBJECTIVE    Pain Level (0-10 scale): 2/10    Any medication changes, allergies to medications, adverse drug reactions, diagnosis change, or new procedure performed?: [x] No    [] Yes (see summary sheet for update)  Medications: Verified on Patient Summary List    Subjective functional status/changes:     Pt states he is doing well w/ no issues. OBJECTIVE      Therapeutic Procedures: Tx Min Billable or 1:1 Min (if diff from Tx Min) Procedure, Rationale, Specifics   20  93754 Therapeutic Exercise (timed):  increase ROM, strength, coordination, balance, and proprioception to improve patient's ability to progress to PLOF and address remaining functional goals. (see flow sheet as applicable)     Details if applicable:     15  71833 Manual Therapy (timed):  decrease pain, increase ROM, increase tissue extensibility, decrease trigger points, and increase postural awareness to improve patient's ability to progress to PLOF and address remaining functional goals. The manual therapy interventions were performed at a separate and distinct time from the therapeutic activities interventions .  (see flow sheet as applicable)

## 2023-11-29 ENCOUNTER — APPOINTMENT (OUTPATIENT)
Facility: HOSPITAL | Age: 78
End: 2023-11-29
Payer: MEDICARE

## 2023-12-01 ENCOUNTER — HOSPITAL ENCOUNTER (OUTPATIENT)
Facility: HOSPITAL | Age: 78
Setting detail: RECURRING SERIES
Discharge: HOME | End: 2023-12-04
Payer: MEDICARE

## 2023-12-01 PROCEDURE — 97110 THERAPEUTIC EXERCISES: CPT

## 2023-12-01 PROCEDURE — 97140 MANUAL THERAPY 1/> REGIONS: CPT

## 2023-12-01 NOTE — PROGRESS NOTES
therapeutic interventions, analyze and address functional mobility deficits, analyze and address ROM deficits, analyze and address strength deficits, analyze and address soft tissue restrictions, analyze and cue for proper movement patterns, analyze and modify for postural abnormalities, and instruct in home and community integration to address functional deficits and attain remaining goals. Progress toward goals / Updated goals:  []  See Progress Note/Recertification    Short Term Goals: To be accomplished in 8 treatments. Pt will be I w/ HEP in order to take active role in therapy- MET  Pt will apply heat to neck at least 2 x day in order to reduce pain- MET  Pt will avoid reading book for over 30 minutes at one time in order to reduce strain on neck- MET  Long Term Goals: To be accomplished in 24 treatments.   Pt will report over 10 point improvement on FOTO in order to improve function- PROGRESSING  Pt will demonstrate L cervical rotation over 25 deg w/o pain in order to better drive- MET  Pt will be able to sit for over 30 minutes w/o increase in pain in order to better perform ADL's- PROGRESSING      PLAN  Yes  Continue plan of care  Re-Cert Due: 48/77/14  [x]  Upgrade activities as tolerated  []  Discharge due to :  []  Other:      Tha South, PT       12/1/2023       11:46 AM

## 2023-12-05 ENCOUNTER — HOSPITAL ENCOUNTER (OUTPATIENT)
Facility: HOSPITAL | Age: 78
Setting detail: RECURRING SERIES
Discharge: HOME | End: 2023-12-08
Payer: MEDICARE

## 2023-12-05 PROCEDURE — 97110 THERAPEUTIC EXERCISES: CPT

## 2023-12-05 PROCEDURE — 97140 MANUAL THERAPY 1/> REGIONS: CPT

## 2023-12-05 NOTE — PROGRESS NOTES
soft tissue restrictions, analyze and cue for proper movement patterns, analyze and modify for postural abnormalities, and instruct in home and community integration to address functional deficits and attain remaining goals. Progress toward goals / Updated goals:  []  See Progress Note/Recertification    Short Term Goals: To be accomplished in 8 treatments. Pt will be I w/ HEP in order to take active role in therapy- MET  Pt will apply heat to neck at least 2 x day in order to reduce pain- MET  Pt will avoid reading book for over 30 minutes at one time in order to reduce strain on neck- MET  Long Term Goals: To be accomplished in 24 treatments.   Pt will report over 10 point improvement on FOTO in order to improve function- PROGRESSING  Pt will demonstrate L cervical rotation over 25 deg w/o pain in order to better drive- MET  Pt will be able to sit for over 30 minutes w/o increase in pain in order to better perform ADL's- PROGRESSING      PLAN  Yes  Continue plan of care  Re-Cert Due: 43/20/41  [x]  Upgrade activities as tolerated  []  Discharge due to :  []  Other:      Verla Hodgkins, PT       12/5/2023       2:37 PM

## 2023-12-07 ENCOUNTER — HOSPITAL ENCOUNTER (OUTPATIENT)
Facility: HOSPITAL | Age: 78
Setting detail: RECURRING SERIES
Discharge: HOME | End: 2023-12-10
Payer: MEDICARE

## 2023-12-07 PROCEDURE — 97110 THERAPEUTIC EXERCISES: CPT

## 2023-12-07 PROCEDURE — 97140 MANUAL THERAPY 1/> REGIONS: CPT

## 2023-12-07 NOTE — PROGRESS NOTES
analyze and address ROM deficits, analyze and address strength deficits, analyze and address soft tissue restrictions, analyze and cue for proper movement patterns, analyze and modify for postural abnormalities, and instruct in home and community integration to address functional deficits and attain remaining goals. Progress toward goals / Updated goals:  []  See Progress Note/Recertification    Short Term Goals: To be accomplished in 8 treatments. Pt will be I w/ HEP in order to take active role in therapy- MET  Pt will apply heat to neck at least 2 x day in order to reduce pain- MET  Pt will avoid reading book for over 30 minutes at one time in order to reduce strain on neck- MET  Long Term Goals: To be accomplished in 24 treatments.   Pt will report over 10 point improvement on FOTO in order to improve function- PROGRESSING  Pt will demonstrate L cervical rotation over 25 deg w/o pain in order to better drive- MET  Pt will be able to sit for over 30 minutes w/o increase in pain in order to better perform ADL's- PROGRESSING      PLAN  Yes  Continue plan of care  Re-Cert Due: 59/03/28  [x]  Upgrade activities as tolerated  []  Discharge due to :  []  Other:      Halley Lin, PT       12/7/2023       2:01 PM

## 2023-12-11 ENCOUNTER — HOSPITAL ENCOUNTER (OUTPATIENT)
Facility: HOSPITAL | Age: 78
Setting detail: RECURRING SERIES
Discharge: HOME | End: 2023-12-14
Payer: MEDICARE

## 2023-12-11 ENCOUNTER — APPOINTMENT (OUTPATIENT)
Facility: HOSPITAL | Age: 78
End: 2023-12-11
Payer: MEDICARE

## 2023-12-11 PROCEDURE — 97140 MANUAL THERAPY 1/> REGIONS: CPT

## 2023-12-11 PROCEDURE — 97110 THERAPEUTIC EXERCISES: CPT

## 2023-12-11 NOTE — PROGRESS NOTES
PHYSICAL THERAPY - MEDICARE DAILY TREATMENT NOTE (updated 3/23)      Date: 2023          Patient Name:  Amanda Tucker :  1945   Medical   Diagnosis:  Neck pain on left side [M54.2] Treatment Diagnosis:  M54.2, G89.29  CHRONIC NECK PAIN    Referral Source:  Sandi Vásquez MD Insurance:   Payor: MEDICARE / Plan: MEDICARE PART A AND B / Product Type: *No Product type* /                     Patient  verified yes     Visit #   Current  / Total 17 Med Nec   Time   In / Out 4:25 PM 5:10 PM   Total Treatment Time 45   Total Timed Codes 35   1:1 Treatment Time 35      [facility] Totals Reminder:  bill using total billable   min of TIMED therapeutic procedures and modalities. 8-22 min = 1 unit; 23-37 min = 2 units; 38-52 min = 3 units; 53-67 min = 4 units; 68-82 min = 5 units            SUBJECTIVE    Pain Level (0-10 scale): 1/10    Any medication changes, allergies to medications, adverse drug reactions, diagnosis change, or new procedure performed?: [x] No    [] Yes (see summary sheet for update)  Medications: Verified on Patient Summary List    Subjective functional status/changes:     Pt states his last PT session will be Wednesday. OBJECTIVE      Therapeutic Procedures: Tx Min Billable or 1:1 Min (if diff from Tx Min) Procedure, Rationale, Specifics   20  37033 Therapeutic Exercise (timed):  increase ROM, strength, coordination, balance, and proprioception to improve patient's ability to progress to PLOF and address remaining functional goals. (see flow sheet as applicable)     Details if applicable:     15  14214 Manual Therapy (timed):  decrease pain, increase ROM, increase tissue extensibility, decrease trigger points, and increase postural awareness to improve patient's ability to progress to PLOF and address remaining functional goals. The manual therapy interventions were performed at a separate and distinct time from the therapeutic activities interventions .  (see flow sheet as

## 2023-12-13 ENCOUNTER — HOSPITAL ENCOUNTER (OUTPATIENT)
Facility: HOSPITAL | Age: 78
Setting detail: RECURRING SERIES
Discharge: HOME | End: 2023-12-16
Payer: MEDICARE

## 2023-12-13 ENCOUNTER — APPOINTMENT (OUTPATIENT)
Facility: HOSPITAL | Age: 78
End: 2023-12-13
Payer: MEDICARE

## 2023-12-13 PROCEDURE — 97110 THERAPEUTIC EXERCISES: CPT

## 2023-12-13 PROCEDURE — 97140 MANUAL THERAPY 1/> REGIONS: CPT

## 2023-12-13 NOTE — PROGRESS NOTES
PHYSICAL THERAPY - MEDICARE DAILY TREATMENT NOTE (updated 3/23)      Date: 2023          Patient Name:  Rk Arceo :  1945   Medical   Diagnosis:  Neck pain on left side [M54.2] Treatment Diagnosis:  M54.2, G89.29  CHRONIC NECK PAIN    Referral Source:  Harriet Graham MD Insurance:   Payor: MEDICARE / Plan: MEDICARE PART A AND B / Product Type: *No Product type* /                     Patient  verified yes     Visit #   Current  / Total 18 Med Nec   Time   In / Out 5:00 PM 5:40 PM   Total Treatment Time 40   Total Timed Codes 30   1:1 Treatment Time 30      [facility] Totals Reminder:  bill using total billable   min of TIMED therapeutic procedures and modalities. 8-22 min = 1 unit; 23-37 min = 2 units; 38-52 min = 3 units; 53-67 min = 4 units; 68-82 min = 5 units            SUBJECTIVE    Pain Level (0-10 scale): 1/10    Any medication changes, allergies to medications, adverse drug reactions, diagnosis change, or new procedure performed?: [x] No    [] Yes (see summary sheet for update)  Medications: Verified on Patient Summary List    Subjective functional status/changes:     Pt states he is doing better and not having as much pain. OBJECTIVE      Therapeutic Procedures: Tx Min Billable or 1:1 Min (if diff from Tx Min) Procedure, Rationale, Specifics   15  32431 Therapeutic Exercise (timed):  increase ROM, strength, coordination, balance, and proprioception to improve patient's ability to progress to PLOF and address remaining functional goals. (see flow sheet as applicable)     Details if applicable:     15  99684 Manual Therapy (timed):  decrease pain, increase ROM, increase tissue extensibility, decrease trigger points, and increase postural awareness to improve patient's ability to progress to PLOF and address remaining functional goals. The manual therapy interventions were performed at a separate and distinct time from the therapeutic activities interventions .  (see flow sheet

## 2023-12-20 ENCOUNTER — APPOINTMENT (OUTPATIENT)
Facility: HOSPITAL | Age: 78
End: 2023-12-20
Payer: MEDICARE

## 2024-02-05 ENCOUNTER — TELEPHONE (OUTPATIENT)
Age: 79
End: 2024-02-05

## 2024-02-05 DIAGNOSIS — M15.9 OSTEOARTHRITIS OF MULTIPLE JOINTS, UNSPECIFIED OSTEOARTHRITIS TYPE: ICD-10-CM

## 2024-02-05 RX ORDER — CELECOXIB 200 MG/1
200 CAPSULE ORAL DAILY
Qty: 90 CAPSULE | Refills: 1 | Status: SHIPPED | OUTPATIENT
Start: 2024-02-05

## 2024-02-05 NOTE — TELEPHONE ENCOUNTER
Medication Refill Request    Andrew De La Vega is requesting a refill of the following medication(s):   Celebrex   Please send refill to:   [facility] [id number] - [place] - [address] [phone] - F [phone]  [address]  [place] [address]  Phone: [phone] Fax: [phone]

## 2024-02-05 NOTE — TELEPHONE ENCOUNTER
Medication Refill Request    Andrew De La Vega is requesting a refill of the following medication(s):   Celebrex refill for 90 tabs  Please send refill to:   [facility] [id number] - [place], [place] - [address] [phone] - F [phone]  [address]  [place] [address]  Phone: [phone] Fax: [phone]

## 2024-03-01 SDOH — HEALTH STABILITY: PHYSICAL HEALTH: ON AVERAGE, HOW MANY MINUTES DO YOU ENGAGE IN EXERCISE AT THIS LEVEL?: 90 MIN

## 2024-03-01 SDOH — HEALTH STABILITY: PHYSICAL HEALTH: ON AVERAGE, HOW MANY DAYS PER WEEK DO YOU ENGAGE IN MODERATE TO STRENUOUS EXERCISE (LIKE A BRISK WALK)?: 2 DAYS

## 2024-03-01 ASSESSMENT — PATIENT HEALTH QUESTIONNAIRE - PHQ9
SUM OF ALL RESPONSES TO PHQ QUESTIONS 1-9: 0
SUM OF ALL RESPONSES TO PHQ QUESTIONS 1-9: 0
1. LITTLE INTEREST OR PLEASURE IN DOING THINGS: 0
SUM OF ALL RESPONSES TO PHQ QUESTIONS 1-9: 0
2. FEELING DOWN, DEPRESSED OR HOPELESS: 0
SUM OF ALL RESPONSES TO PHQ9 QUESTIONS 1 & 2: 0
SUM OF ALL RESPONSES TO PHQ QUESTIONS 1-9: 0

## 2024-03-01 ASSESSMENT — LIFESTYLE VARIABLES
HOW MANY STANDARD DRINKS CONTAINING ALCOHOL DO YOU HAVE ON A TYPICAL DAY: 1
HOW OFTEN DO YOU HAVE A DRINK CONTAINING ALCOHOL: 2-3 TIMES A WEEK
HOW MANY STANDARD DRINKS CONTAINING ALCOHOL DO YOU HAVE ON A TYPICAL DAY: 1 OR 2
HOW OFTEN DO YOU HAVE SIX OR MORE DRINKS ON ONE OCCASION: 1
HOW OFTEN DO YOU HAVE A DRINK CONTAINING ALCOHOL: 4

## 2024-03-04 ENCOUNTER — OFFICE VISIT (OUTPATIENT)
Age: 79
End: 2024-03-04
Payer: MEDICARE

## 2024-03-04 VITALS
WEIGHT: 159.6 LBS | HEART RATE: 85 BPM | OXYGEN SATURATION: 95 % | DIASTOLIC BLOOD PRESSURE: 83 MMHG | HEIGHT: 72 IN | RESPIRATION RATE: 16 BRPM | SYSTOLIC BLOOD PRESSURE: 132 MMHG | BODY MASS INDEX: 21.62 KG/M2 | TEMPERATURE: 97.9 F

## 2024-03-04 DIAGNOSIS — G89.29 CHRONIC NECK PAIN: ICD-10-CM

## 2024-03-04 DIAGNOSIS — I10 HYPERTENSION, ESSENTIAL: ICD-10-CM

## 2024-03-04 DIAGNOSIS — E78.00 PURE HYPERCHOLESTEROLEMIA: ICD-10-CM

## 2024-03-04 DIAGNOSIS — M54.2 CHRONIC NECK PAIN: ICD-10-CM

## 2024-03-04 DIAGNOSIS — Z00.00 MEDICARE ANNUAL WELLNESS VISIT, SUBSEQUENT: Primary | ICD-10-CM

## 2024-03-04 DIAGNOSIS — R14.0 ABDOMINAL BLOATING: ICD-10-CM

## 2024-03-04 DIAGNOSIS — M15.9 GENERALIZED OSTEOARTHRITIS: ICD-10-CM

## 2024-03-04 DIAGNOSIS — N40.1 BENIGN PROSTATIC HYPERPLASIA WITH NOCTURIA: ICD-10-CM

## 2024-03-04 DIAGNOSIS — R35.1 BENIGN PROSTATIC HYPERPLASIA WITH NOCTURIA: ICD-10-CM

## 2024-03-04 DIAGNOSIS — Z71.89 ADVANCED CARE PLANNING/COUNSELING DISCUSSION: ICD-10-CM

## 2024-03-04 DIAGNOSIS — R73.03 PRE-DIABETES: ICD-10-CM

## 2024-03-04 PROCEDURE — 1036F TOBACCO NON-USER: CPT | Performed by: INTERNAL MEDICINE

## 2024-03-04 PROCEDURE — 99214 OFFICE O/P EST MOD 30 MIN: CPT | Performed by: INTERNAL MEDICINE

## 2024-03-04 PROCEDURE — G0439 PPPS, SUBSEQ VISIT: HCPCS | Performed by: INTERNAL MEDICINE

## 2024-03-04 PROCEDURE — G8484 FLU IMMUNIZE NO ADMIN: HCPCS | Performed by: INTERNAL MEDICINE

## 2024-03-04 PROCEDURE — 3079F DIAST BP 80-89 MM HG: CPT | Performed by: INTERNAL MEDICINE

## 2024-03-04 PROCEDURE — G8420 CALC BMI NORM PARAMETERS: HCPCS | Performed by: INTERNAL MEDICINE

## 2024-03-04 PROCEDURE — 1123F ACP DISCUSS/DSCN MKR DOCD: CPT | Performed by: INTERNAL MEDICINE

## 2024-03-04 PROCEDURE — G8427 DOCREV CUR MEDS BY ELIG CLIN: HCPCS | Performed by: INTERNAL MEDICINE

## 2024-03-04 PROCEDURE — 3075F SYST BP GE 130 - 139MM HG: CPT | Performed by: INTERNAL MEDICINE

## 2024-03-04 RX ORDER — ALFUZOSIN HYDROCHLORIDE 10 MG/1
10 TABLET, EXTENDED RELEASE ORAL DAILY
COMMUNITY
Start: 2023-12-14

## 2024-03-04 SDOH — ECONOMIC STABILITY: FOOD INSECURITY: WITHIN THE PAST 12 MONTHS, YOU WORRIED THAT YOUR FOOD WOULD RUN OUT BEFORE YOU GOT MONEY TO BUY MORE.: NEVER TRUE

## 2024-03-04 SDOH — ECONOMIC STABILITY: FOOD INSECURITY: WITHIN THE PAST 12 MONTHS, THE FOOD YOU BOUGHT JUST DIDN'T LAST AND YOU DIDN'T HAVE MONEY TO GET MORE.: NEVER TRUE

## 2024-03-04 SDOH — ECONOMIC STABILITY: INCOME INSECURITY: HOW HARD IS IT FOR YOU TO PAY FOR THE VERY BASICS LIKE FOOD, HOUSING, MEDICAL CARE, AND HEATING?: NOT HARD AT ALL

## 2024-03-04 ASSESSMENT — ENCOUNTER SYMPTOMS
NAUSEA: 0
VOMITING: 0
COUGH: 0
CONSTIPATION: 0
SHORTNESS OF BREATH: 0
DIARRHEA: 0
ABDOMINAL PAIN: 0
BLOOD IN STOOL: 0

## 2024-03-04 NOTE — ASSESSMENT & PLAN NOTE
Chronic issue, improved but not resolved, he did get good relief with PT initially, will have him see PT again. We reviewed when to consider imaging or seeing pain or surgeon

## 2024-03-04 NOTE — PROGRESS NOTES
Medicare Annual Wellness Visit    Andrew De La Vega is here for Medicare AWV    Assessment & Plan   Medicare annual wellness visit, subsequent  Advanced care planning/counseling discussion  -     FULL CODE  Hypertension, essential  Assessment & Plan:  well controlled, continue current treatment.  Doing well on lower dose of medication   Pure hypercholesterolemia  Assessment & Plan:  at goal, continue current treatment    Pre-diabetes  Assessment & Plan:  Likely stable, asymptomatic, reviewed diet changes, will defer labs to follow up  Chronic neck pain  Assessment & Plan:  Chronic issue, improved but not resolved, he did get good relief with PT initially, will have him see PT again. We reviewed when to consider imaging or seeing pain or surgeon   Orders:  -     [facility]  Abdominal bloating  Assessment & Plan:  Chronic and stable.  No red flags   Generalized osteoarthritis  Assessment & Plan:  stable, continue current treatment (Celebrex is working better then Diclofenac)   Benign prostatic hyperplasia with nocturia  Assessment & Plan:  Stable. He is followed by specialist and I will defer to him     Recommendations for Preventive Services Due: see orders and patient instructions/AVS.  Recommended screening schedule for the next 5-10 years is provided to the patient in written form: see Patient Instructions/AVS.     Return in 1 year (on 3/4/2025) for FULL PHYSICAL.       Subjective   Since last visit: weight is stable.    See ACP Note for Advanced Care Directive Discussion    Health Maintenance  Immunizations:   COVID: up to date  Influenza: up to date  RSV: up to date   Tetanus: up to date    Shingles: up to date   Pneumonia: up to date    Cancer screening:     Colon: reviewed guidelines, up to date.    Prostate: reviewed guidelines, declined.       AAA Screenin19       The following acute and/or chronic problems were addressed today:     Cardiovascular Review  The

## 2024-03-04 NOTE — ACP (ADVANCE CARE PLANNING)
Advance Care Planning   Advance Care Planning (ACP) Physician/NP/PA (Provider) Conversation    Date of ACP Conversation: 03/04/24  Persons included in Conversation:  patient  Length of ACP Conversation in minutes: <16 minutes (Non-Billable)    We discussed the patient’s choices for care and treatment preferences in case of a health event that adversely affects decision-making abilities or is life-limiting. We discusses the differences between FULL CODE and DNR and when to consider a change in code status.  The limitations with CPR were reviewed.    Has ACP document(s) on file - reflects the patient's care preferences.  He elects Full Code (Attempt Resuscitation)    The patient has appointed the following active healthcare agents:    Primary Decision Maker: Maureen De La Vega - Spouse - [phone]     The Patient has the following current code status:    Code Status: Full Code    Angel Chandler MD

## 2024-03-04 NOTE — PATIENT INSTRUCTIONS
can also have tofu, beans, peas, lentils, nuts, and seeds.    Dairy    Try milk, yogurt, and cheese.  Choose low-fat or fat-free when you can.  If you need to, use lactose-free milk or fortified plant-based milk products, such as soy milk.    Water    Drink water when you're thirsty.  Limit sugar-sweetened drinks, including soda, fruit drinks, and sports drinks.  Where can you learn more?  Go to https://www.[employer] [place].net/patientEd and enter T756 to learn more about \"Eating Healthy Foods: Care Instructions.\"  Current as of: September 20, 2023               Content Version: 13.9  © 2006-2023 [employer].   Care instructions adapted under license by [employer]. If you have questions about a medical condition or this instruction, always ask your healthcare professional. [employer] disclaims any warranty or liability for your use of this information.      Personalized Preventive Plan for Andrew De La Vega - 3/4/2024  Medicare offers a range of preventive health benefits. Some of the tests and screenings are paid in full while other may be subject to a deductible, co-insurance, and/or copay.    Some of these benefits include a comprehensive review of your medical history including lifestyle, illnesses that may run in your family, and various assessments and screenings as appropriate.    After reviewing your medical record and screening and assessments performed today your provider may have ordered immunizations, labs, imaging, and/or referrals for you.  A list of these orders (if applicable) as well as your Preventive Care list are included within your After Visit Summary for your review.    Other Preventive Recommendations:    A preventive eye exam performed by an eye specialist is recommended every 1-2 years to screen for glaucoma; cataracts, macular degeneration, and other eye disorders.  A preventive dental visit is recommended every 6 months.  Try to get at least 150 minutes of exercise

## 2024-03-07 ENCOUNTER — TELEPHONE (OUTPATIENT)
Age: 79
End: 2024-03-07

## 2024-03-07 NOTE — TELEPHONE ENCOUNTER
Reason for call:  pt states [facility] was supposed to order PT at the same place pt had it before but no one has contacted him please call    Is this a new problem: Yes    Date of last appointment:  3/4/2024     Can we respond via [place]: No    Best call back number:     Andrew De La Vega \"Gist\" (Self) [phone] (Home)

## 2024-03-26 ENCOUNTER — HOSPITAL ENCOUNTER (OUTPATIENT)
Facility: HOSPITAL | Age: 79
Setting detail: RECURRING SERIES
Discharge: HOME | End: 2024-03-29
Attending: INTERNAL MEDICINE
Payer: MEDICARE

## 2024-03-26 PROCEDURE — 97110 THERAPEUTIC EXERCISES: CPT

## 2024-03-26 PROCEDURE — 97161 PT EVAL LOW COMPLEX 20 MIN: CPT

## 2024-03-26 NOTE — THERAPY EVALUATION
performing HEP in order to maximize benefit of PT; continued use of heating pad; discussed proper cervical alignment in resting      Pain Level at end of session (0-10 scale): not captured    Plan of Care / Statement of Necessity for Physical Therapy Services     Assessment / key information:  Pt is a 79 year old male presenting with signs and symptoms consistent with cervical OA. He will benefit from PT to address problem list below.    Evaluation Complexity:  History:  {PT OP History:22790}; Examination:  {PT OP Examination:5013656332} ;Presentation:  {PT OP Presentation:80369} ;Clinical Decision Making:  {PT OP Decision Makin} Overall Complexity Rating: {PT OP Complexity:3070287853}  Problem List: pain affecting function, decrease ROM, decrease ADL/functional abilities, decrease activity tolerance, and decrease flexibility/joint mobility   Treatment Plan may include any combination of the followin Therapeutic Exercise, 42629 Neuromuscular Re-Education, 97153 Manual Therapy, 31165 Therapeutic Activity, 83122 Self Care/Home Management, 35600 Electrical Stim unattended, 22276 Electrical Stim attended, 27080 Ultrasound, and 81538 Mechanical Traction  Patient / Family readiness to learn indicated by: asking questions, trying to perform skills, interest, return verbalization , and return demonstration   Persons(s) to be included in education: patient (P)  Barriers to Learning/Limitations: none  Measures taken if barriers to learning present: --  Patient Self Reported Health Status: good  Rehabilitation Potential: good    Short Term Goals: To be accomplished in 8-10 treatments.  1) Pt will be I in initial HEP  2) Pt will report >/=25% improvement in symptoms    Long Term Goals: To be accomplished in 22-24 treatments.  ***    Frequency / Duration: Patient to be seen 1-2 times per week for 22-24 treatments.    Patient/ Caregiver education and instruction: Diagnosis, prognosis, self care, activity

## 2024-04-03 ENCOUNTER — HOSPITAL ENCOUNTER (OUTPATIENT)
Facility: HOSPITAL | Age: 79
Setting detail: RECURRING SERIES
Discharge: HOME | End: 2024-04-06
Attending: INTERNAL MEDICINE
Payer: MEDICARE

## 2024-04-03 PROCEDURE — 97110 THERAPEUTIC EXERCISES: CPT

## 2024-04-03 PROCEDURE — 97140 MANUAL THERAPY 1/> REGIONS: CPT

## 2024-04-03 NOTE — PROGRESS NOTES
PHYSICAL THERAPY - MEDICARE DAILY TREATMENT NOTE (updated 3/23)      Date: 4/3/2024          Patient Name:  Andrew De La Vega :  1945   Medical   Diagnosis:  Chronic neck pain [M54.2, G89.29] Treatment Diagnosis:  M54.2, G89.29  CHRONIC NECK PAIN    Referral Source:  Angel Chandler MD Insurance:   Payor: MEDICARE / Plan: MEDICARE PART A AND B / Product Type: *No Product type* /                     Patient  verified yes     Visit #   Current  / Total 2 24   Time   In / Out 430 P 5:00 P   Total Treatment Time 30   Total Timed Codes 30   1:1 Treatment Time 25      [facility] Totals Reminder:  bill using total billable   min of TIMED therapeutic procedures and modalities.   8-22 min = 1 unit; 23-37 min = 2 units; 38-52 min = 3 units; 53-67 min = 4 units; 68-82 min = 5 units            SUBJECTIVE    Pain Level (0-10 scale): \"if I don't move it, it's okay\"    Any medication changes, allergies to medications, adverse drug reactions, diagnosis change, or new procedure performed?: [x] No    [] Yes (see summary sheet for update)  Medications: Verified on Patient Summary List    Subjective functional status/changes:     He has some questions about if he is doing the exercises correctly.    OBJECTIVE      Therapeutic Procedures:  Tx Min Billable or 1:1 Min (if diff from Tx Min) Procedure, Rationale, Specifics   20 15 14843 Therapeutic Exercise (timed):  increase ROM, strength, coordination, balance, and proprioception to improve patient's ability to progress to PLOF and address remaining functional goals. (see flow sheet as applicable)     Details if applicable:     10 10 11721 Manual Therapy (timed):  decrease pain, increase ROM, increase tissue extensibility, and decrease trigger points to improve patient's ability to progress to PLOF and address remaining functional goals.  The manual therapy interventions were performed at a separate and distinct time from the therapeutic activities interventions . (see flow

## 2024-04-05 ENCOUNTER — HOSPITAL ENCOUNTER (OUTPATIENT)
Facility: HOSPITAL | Age: 79
Setting detail: RECURRING SERIES
Discharge: HOME | End: 2024-04-08
Attending: INTERNAL MEDICINE
Payer: MEDICARE

## 2024-04-05 PROCEDURE — 97140 MANUAL THERAPY 1/> REGIONS: CPT

## 2024-04-05 PROCEDURE — 97110 THERAPEUTIC EXERCISES: CPT

## 2024-04-05 NOTE — PROGRESS NOTES
attain remaining goals.    Progress toward goals / Updated goals:  []  See Progress Note/Recertification  NT    PLAN  Yes  Continue plan of care  Re-Cert Due: 6/20/24  []  Upgrade activities as tolerated  []  Discharge due to:  []  Other:      Daysi Bourne, PT       4/5/2024       10:57 AM

## 2024-04-08 ENCOUNTER — HOSPITAL ENCOUNTER (OUTPATIENT)
Facility: HOSPITAL | Age: 79
Setting detail: RECURRING SERIES
Discharge: HOME | End: 2024-04-11
Attending: INTERNAL MEDICINE
Payer: MEDICARE

## 2024-04-08 PROCEDURE — 97140 MANUAL THERAPY 1/> REGIONS: CPT

## 2024-04-08 PROCEDURE — 97110 THERAPEUTIC EXERCISES: CPT

## 2024-04-08 NOTE — PROGRESS NOTES
PHYSICAL THERAPY - MEDICARE DAILY TREATMENT NOTE (updated 3/23)      Date: 2024          Patient Name:  Andrew De La Vega :  1945   Medical   Diagnosis:  Chronic neck pain [M54.2, G89.29] Treatment Diagnosis:  M54.2, G89.29  CHRONIC NECK PAIN    Referral Source:  Angel Chandler MD Insurance:   Payor: MEDICARE / Plan: MEDICARE PART A AND B / Product Type: *No Product type* /                     Patient  verified yes     Visit #   Current  / Total 4 24   Time   In / Out 430 P 5:00 P   Total Treatment Time 30   Total Timed Codes 30   1:1 Treatment Time 25      [facility] Totals Reminder:  bill using total billable   min of TIMED therapeutic procedures and modalities.   8-22 min = 1 unit; 23-37 min = 2 units; 38-52 min = 3 units; 53-67 min = 4 units; 68-82 min = 5 units            SUBJECTIVE    Pain Level (0-10 scale): \"the same\"    Any medication changes, allergies to medications, adverse drug reactions, diagnosis change, or new procedure performed?: [x] No    [] Yes (see summary sheet for update)  Medications: Verified on Patient Summary List    Subjective functional status/changes:     Overall doing well; good compliance with HEP    OBJECTIVE      Therapeutic Procedures:  Tx Min Billable or 1:1 Min (if diff from Tx Min) Procedure, Rationale, Specifics   20 15 82089 Therapeutic Exercise (timed):  increase ROM, strength, coordination, balance, and proprioception to improve patient's ability to progress to PLOF and address remaining functional goals. (see flow sheet as applicable)     Details if applicable:     10 10 63815 Manual Therapy (timed):  decrease pain, increase ROM, increase tissue extensibility, and decrease trigger points to improve patient's ability to progress to PLOF and address remaining functional goals.  The manual therapy interventions were performed at a separate and distinct time from the therapeutic activities interventions . (see flow sheet as applicable)     Details if

## 2024-04-11 ENCOUNTER — HOSPITAL ENCOUNTER (OUTPATIENT)
Facility: HOSPITAL | Age: 79
Setting detail: RECURRING SERIES
Discharge: HOME | End: 2024-04-14
Attending: INTERNAL MEDICINE
Payer: MEDICARE

## 2024-04-11 PROCEDURE — 97140 MANUAL THERAPY 1/> REGIONS: CPT

## 2024-04-11 PROCEDURE — 97110 THERAPEUTIC EXERCISES: CPT

## 2024-04-11 NOTE — PROGRESS NOTES
remaining goals.    Progress toward goals / Updated goals:  []  See Progress Note/Recertification  NT    PLAN  Yes  Continue plan of care  Re-Cert Due: 6/20/24  []  Upgrade activities as tolerated  []  Discharge due to:  []  Other:      Daysi Bourne, PT       4/11/2024       2:47 PM

## 2024-04-15 ENCOUNTER — HOSPITAL ENCOUNTER (OUTPATIENT)
Facility: HOSPITAL | Age: 79
Setting detail: RECURRING SERIES
Discharge: HOME | End: 2024-04-18
Attending: INTERNAL MEDICINE
Payer: MEDICARE

## 2024-04-15 PROCEDURE — 97110 THERAPEUTIC EXERCISES: CPT

## 2024-04-15 PROCEDURE — 97140 MANUAL THERAPY 1/> REGIONS: CPT

## 2024-04-15 NOTE — PROGRESS NOTES
PHYSICAL THERAPY - MEDICARE DAILY TREATMENT NOTE (updated 3/23)      Date: 4/15/2024          Patient Name:  Andrew De La Vega :  1945   Medical   Diagnosis:  Chronic neck pain [M54.2, G89.29] Treatment Diagnosis:  M54.2, G89.29  CHRONIC NECK PAIN    Referral Source:  Angel Chandler MD Insurance:   Payor: MEDICARE / Plan: MEDICARE PART A AND B / Product Type: *No Product type* /                     Patient  verified yes     Visit #   Current  / Total 6 24   Time   In / Out 2:30 P 3:05 P   Total Treatment Time 35   Total Timed Codes 35   1:1 Treatment Time 35       [facility] Totals Reminder:  bill using total billable   min of TIMED therapeutic procedures and modalities.   8-22 min = 1 unit; 23-37 min = 2 units; 38-52 min = 3 units; 53-67 min = 4 units; 68-82 min = 5 units            SUBJECTIVE    Pain Level (0-10 scale): 4    Any medication changes, allergies to medications, adverse drug reactions, diagnosis change, or new procedure performed?: [x] No    [] Yes (see summary sheet for update)  Medications: Verified on Patient Summary List    Subjective functional status/changes:     Pt states he is doing about the same. Pt reports his neck was less sore at the end of the day on Saturday.    OBJECTIVE      Therapeutic Procedures:  Tx Min Billable or 1:1 Min (if diff from Tx Min) Procedure, Rationale, Specifics   20  23903 Therapeutic Exercise (timed):  increase ROM, strength, coordination, balance, and proprioception to improve patient's ability to progress to PLOF and address remaining functional goals. (see flow sheet as applicable)     Details if applicable:     15  26368 Manual Therapy (timed):  decrease pain, increase ROM, increase tissue extensibility, and decrease trigger points to improve patient's ability to progress to PLOF and address remaining functional goals.  The manual therapy interventions were performed at a separate and distinct time from the therapeutic activities interventions . (see

## 2024-04-18 ENCOUNTER — APPOINTMENT (OUTPATIENT)
Facility: HOSPITAL | Age: 79
End: 2024-04-18
Attending: INTERNAL MEDICINE
Payer: MEDICARE

## 2024-04-19 ENCOUNTER — HOSPITAL ENCOUNTER (OUTPATIENT)
Facility: HOSPITAL | Age: 79
Setting detail: RECURRING SERIES
Discharge: HOME | End: 2024-04-22
Attending: INTERNAL MEDICINE
Payer: MEDICARE

## 2024-04-19 PROCEDURE — 97110 THERAPEUTIC EXERCISES: CPT

## 2024-04-19 PROCEDURE — 97140 MANUAL THERAPY 1/> REGIONS: CPT

## 2024-04-19 NOTE — PROGRESS NOTES
PHYSICAL THERAPY - MEDICARE DAILY TREATMENT NOTE (updated 3/23)      Date: 2024          Patient Name:  Andrew De La Vega :  1945   Medical   Diagnosis:  Chronic neck pain [M54.2, G89.29] Treatment Diagnosis:  M54.2, G89.29  CHRONIC NECK PAIN    Referral Source:  Angel Chandler MD Insurance:   Payor: MEDICARE / Plan: MEDICARE PART A AND B / Product Type: *No Product type* /                     Patient  verified yes     Visit #   Current  / Total 7 24   Time   In / Out 11:30 am 12:05 pm   Total Treatment Time 35   Total Timed Codes 35   1:1 Treatment Time 35       [facility] Totals Reminder:  bill using total billable   min of TIMED therapeutic procedures and modalities.   8-22 min = 1 unit; 23-37 min = 2 units; 38-52 min = 3 units; 53-67 min = 4 units; 68-82 min = 5 units            SUBJECTIVE    Pain Level (0-10 scale): 4    Any medication changes, allergies to medications, adverse drug reactions, diagnosis change, or new procedure performed?: [x] No    [] Yes (see summary sheet for update)  Medications: Verified on Patient Summary List    Subjective functional status/changes:     Pt states he feels like his pain hits him less frequent but is still just as painful.    OBJECTIVE      Therapeutic Procedures:  Tx Min Billable or 1:1 Min (if diff from Tx Min) Procedure, Rationale, Specifics   20  24409 Therapeutic Exercise (timed):  increase ROM, strength, coordination, balance, and proprioception to improve patient's ability to progress to PLOF and address remaining functional goals. (see flow sheet as applicable)     Details if applicable:     15  93816 Manual Therapy (timed):  decrease pain, increase ROM, increase tissue extensibility, and decrease trigger points to improve patient's ability to progress to PLOF and address remaining functional goals.  The manual therapy interventions were performed at a separate and distinct time from the therapeutic activities interventions . (see flow sheet as

## 2024-05-15 DIAGNOSIS — E78.00 PURE HYPERCHOLESTEROLEMIA, UNSPECIFIED: ICD-10-CM

## 2024-05-15 RX ORDER — ATORVASTATIN CALCIUM 10 MG/1
TABLET, FILM COATED ORAL
Qty: 90 TABLET | Refills: 3 | Status: SHIPPED | OUTPATIENT
Start: 2024-05-15

## 2024-05-15 NOTE — TELEPHONE ENCOUNTER
Chief Complaint   Patient presents with    Medication Refill     Last Appointment with Dr. Angel Chandler:  3/4/24    Future Appointments   Date Time Provider Department Center   3/10/2025  2:00 PM Angel Chandler MD [facility]

## 2024-08-07 DIAGNOSIS — M15.9 OSTEOARTHRITIS OF MULTIPLE JOINTS, UNSPECIFIED OSTEOARTHRITIS TYPE: ICD-10-CM

## 2024-08-07 RX ORDER — CELECOXIB 200 MG/1
200 CAPSULE ORAL DAILY
Qty: 90 CAPSULE | Refills: 1 | Status: SHIPPED | OUTPATIENT
Start: 2024-08-07

## 2024-08-07 NOTE — TELEPHONE ENCOUNTER
Chief Complaint   Patient presents with    Medication Refill     Last Appointment with Dr. Angel Chandler:  3/4/24    Future Appointments   Date Time Provider Department Center   3/10/2025  2:00 PM Angel Chandler MD [facility] DEP   [facility]

## 2024-08-24 DIAGNOSIS — I10 HYPERTENSION, ESSENTIAL: ICD-10-CM

## 2024-08-25 RX ORDER — HYDROCHLOROTHIAZIDE 12.5 MG/1
12.5 TABLET ORAL DAILY
Qty: 90 TABLET | Refills: 3 | Status: SHIPPED | OUTPATIENT
Start: 2024-08-25

## 2025-02-06 ENCOUNTER — TELEPHONE (OUTPATIENT)
Age: 80
End: 2025-02-06

## 2025-02-06 DIAGNOSIS — M15.9 OSTEOARTHRITIS OF MULTIPLE JOINTS, UNSPECIFIED OSTEOARTHRITIS TYPE: ICD-10-CM

## 2025-02-06 RX ORDER — CELECOXIB 200 MG/1
200 CAPSULE ORAL DAILY
Qty: 90 CAPSULE | Refills: 0 | Status: SHIPPED | OUTPATIENT
Start: 2025-02-06

## 2025-02-06 NOTE — TELEPHONE ENCOUNTER
Chief Complaint   Patient presents with    Medication Refill     Last Appointment with Dr. Angel Chandler:  3/4/2024   Future Appointments   Date Time Provider Department Center   3/10/2025  2:00 PM Angel Chandler MD [facility] DEP       The above orders were approved via [facility] per Dr. Angel Chandler.

## 2025-02-06 NOTE — TELEPHONE ENCOUNTER
Medication Refill Request    Andrew De La Vega is requesting a refill of the following medication(s):     Celecoxib    Please send refill to:        [facility] [id number] - [place], [place] - [address] - P [phone] - F [phone] (Pharmacy) [phone] (Other Fax)

## 2025-03-07 SDOH — HEALTH STABILITY: PHYSICAL HEALTH: ON AVERAGE, HOW MANY DAYS PER WEEK DO YOU ENGAGE IN MODERATE TO STRENUOUS EXERCISE (LIKE A BRISK WALK)?: 2 DAYS

## 2025-03-07 SDOH — ECONOMIC STABILITY: FOOD INSECURITY: WITHIN THE PAST 12 MONTHS, THE FOOD YOU BOUGHT JUST DIDN'T LAST AND YOU DIDN'T HAVE MONEY TO GET MORE.: NEVER TRUE

## 2025-03-07 SDOH — ECONOMIC STABILITY: TRANSPORTATION INSECURITY
IN THE PAST 12 MONTHS, HAS THE LACK OF TRANSPORTATION KEPT YOU FROM MEDICAL APPOINTMENTS OR FROM GETTING MEDICATIONS?: NO

## 2025-03-07 SDOH — ECONOMIC STABILITY: INCOME INSECURITY: IN THE LAST 12 MONTHS, WAS THERE A TIME WHEN YOU WERE NOT ABLE TO PAY THE MORTGAGE OR RENT ON TIME?: NO

## 2025-03-07 SDOH — HEALTH STABILITY: PHYSICAL HEALTH: ON AVERAGE, HOW MANY MINUTES DO YOU ENGAGE IN EXERCISE AT THIS LEVEL?: 90 MIN

## 2025-03-07 SDOH — ECONOMIC STABILITY: FOOD INSECURITY: WITHIN THE PAST 12 MONTHS, YOU WORRIED THAT YOUR FOOD WOULD RUN OUT BEFORE YOU GOT MONEY TO BUY MORE.: NEVER TRUE

## 2025-03-07 ASSESSMENT — PATIENT HEALTH QUESTIONNAIRE - PHQ9
SUM OF ALL RESPONSES TO PHQ QUESTIONS 1-9: 0
SUM OF ALL RESPONSES TO PHQ QUESTIONS 1-9: 0
2. FEELING DOWN, DEPRESSED OR HOPELESS: NOT AT ALL
SUM OF ALL RESPONSES TO PHQ QUESTIONS 1-9: 0
1. LITTLE INTEREST OR PLEASURE IN DOING THINGS: NOT AT ALL
SUM OF ALL RESPONSES TO PHQ QUESTIONS 1-9: 0

## 2025-03-07 ASSESSMENT — LIFESTYLE VARIABLES
HOW MANY STANDARD DRINKS CONTAINING ALCOHOL DO YOU HAVE ON A TYPICAL DAY: 1 OR 2
HOW OFTEN DO YOU HAVE SIX OR MORE DRINKS ON ONE OCCASION: 1
HOW OFTEN DO YOU HAVE A DRINK CONTAINING ALCOHOL: 4
HOW OFTEN DO YOU HAVE A DRINK CONTAINING ALCOHOL: 2-3 TIMES A WEEK

## 2025-03-10 ENCOUNTER — OFFICE VISIT (OUTPATIENT)
Age: 80
End: 2025-03-10
Payer: MEDICARE

## 2025-03-10 VITALS
OXYGEN SATURATION: 96 % | SYSTOLIC BLOOD PRESSURE: 109 MMHG | BODY MASS INDEX: 20.69 KG/M2 | RESPIRATION RATE: 16 BRPM | HEIGHT: 71 IN | DIASTOLIC BLOOD PRESSURE: 81 MMHG | HEART RATE: 93 BPM | TEMPERATURE: 97.6 F | WEIGHT: 147.8 LBS

## 2025-03-10 DIAGNOSIS — N40.1 BENIGN PROSTATIC HYPERPLASIA WITH NOCTURIA: ICD-10-CM

## 2025-03-10 DIAGNOSIS — R35.1 BENIGN PROSTATIC HYPERPLASIA WITH NOCTURIA: ICD-10-CM

## 2025-03-10 DIAGNOSIS — R73.03 PRE-DIABETES: ICD-10-CM

## 2025-03-10 DIAGNOSIS — E78.00 PURE HYPERCHOLESTEROLEMIA: ICD-10-CM

## 2025-03-10 DIAGNOSIS — Z71.89 ADVANCED CARE PLANNING/COUNSELING DISCUSSION: ICD-10-CM

## 2025-03-10 DIAGNOSIS — Z00.00 MEDICARE ANNUAL WELLNESS VISIT, SUBSEQUENT: Primary | ICD-10-CM

## 2025-03-10 DIAGNOSIS — I10 HYPERTENSION, ESSENTIAL: ICD-10-CM

## 2025-03-10 DIAGNOSIS — R19.5 LOOSE STOOLS: ICD-10-CM

## 2025-03-10 PROCEDURE — 99214 OFFICE O/P EST MOD 30 MIN: CPT | Performed by: INTERNAL MEDICINE

## 2025-03-10 ASSESSMENT — ENCOUNTER SYMPTOMS
SHORTNESS OF BREATH: 0
ABDOMINAL PAIN: 0
NAUSEA: 0
DIARRHEA: 0
VOMITING: 0
CONSTIPATION: 0
BLOOD IN STOOL: 0
COUGH: 0

## 2025-03-10 NOTE — PROGRESS NOTES
Medicare Annual Wellness Visit    Andrew De La Vega is here for Medicare AWV    Assessment & Plan   Medicare annual wellness visit, subsequent  Advanced care planning/counseling discussion  Hypertension, essential  Assessment & Plan:   Chronic, at goal (stable), continue current plan pending work up below  Orders:  -     Comprehensive Metabolic Panel; Future  -     CBC; Future  Pure hypercholesterolemia  Assessment & Plan:   Chronic, at goal (stable), continue current plan pending work up below  Orders:  -     Comprehensive Metabolic Panel; Future  -     Lipid Panel; Future  Pre-diabetes  Assessment & Plan:  Unclear control, with weight loss he did liberalize his diet. Did review he can increase carbohydrates to keep weight in the 150's. Will repeat labs and reassess.   Orders:  -     Comprehensive Metabolic Panel; Future  -     Hemoglobin A1C; Future  Benign prostatic hyperplasia with nocturia  Assessment & Plan:  Stable. Monitored by specialist- no acute findings meriting change in the plan  Loose stools  Comments:  acute on chronic issue, differential reviewed, likely diet related. Recommended to increase fiber in his diet (naturally or vs supplement). No red flags.     Recommendations for Preventive Services Due: see orders and patient instructions/AVS.  Recommended screening schedule for the next 5-10 years is provided to the patient in written form: see Patient Instructions/AVS.     No follow-ups on file.       Subjective   Since last visit: weight has decreased.    See ACP Note for Advanced Care Directive Discussion    Health Maintenance  Immunizations:   COVID: up to date  Influenza: up to date  RSV: up to date  Tetanus: up to date    Shingles: up to date   Pneumonia: up to date    Cancer screening:     Colon: reviewed guidelines, up to date.    Prostate: reviewed guidelines, followed by urologist       AAA Screenin19       The following acute and/or chronic problems were addressed today:

## 2025-03-10 NOTE — ACP (ADVANCE CARE PLANNING)
Advance Care Planning   Advance Care Planning (ACP) Physician/NP/PA (Provider) Conversation    Date of ACP Conversation: 03/10/25  Persons included in Conversation:  patient  Length of ACP Conversation in minutes: <16 minutes (Non-Billable)    We discussed the patient’s choices for care and treatment preferences in case of a health event that adversely affects decision-making abilities or is life-limiting. We discusses the differences between FULL CODE and DNR and when to consider a change in code status.  The limitations with CPR were reviewed.    Has ACP document(s) on file - reflects the patient's care preferences.  He elects Full Code (Attempt Resuscitation)    The patient has appointed the following active healthcare agents:    Primary Decision Maker: Maureen De La Vega - Spouse - [phone]     Angel Chandler MD

## 2025-03-10 NOTE — PATIENT INSTRUCTIONS
Learning About Being Active as an Older Adult  Why is being active important as you get older?     Being active is one of the best things you can do for your health. And it's never too late to start. Being active--or getting active, if you aren't already--has definite benefits. It can:  Give you more energy,  Keep your mind sharp.  Improve balance to reduce your risk of falls.  Help you manage chronic illness with fewer medicines.  No matter how old you are, how fit you are, or what health problems you have, there is a form of activity that will work for you. And the more physical activity you can do, the better your overall health will be.  What kinds of activity can help you stay healthy?  Being more active will make your daily activities easier. Physical activity includes planned exercise and things you do in daily life. There are four types of activity:  Aerobic.  Doing aerobic activity makes your heart and lungs strong.  Includes walking, dancing, and gardening.  Aim for at least 2½ hours spread throughout the week.  It improves your energy and can help you sleep better.  Muscle-strengthening.  This type of activity can help maintain muscle and strengthen bones.  Includes climbing stairs, using resistance bands, and lifting or carrying heavy loads.  Aim for at least twice a week.  It can help protect the knees and other joints.  Stretching.  Stretching gives you better range of motion in joints and muscles.  Includes upper arm stretches, calf stretches, and gentle yoga.  Aim for at least twice a week, preferably after your muscles are warmed up from other activities.  It can help you function better in daily life.  Balancing.  This helps you stay coordinated and have good posture.  Includes heel-to-toe walking, kathleen chi, and certain types of yoga.  Aim for at least 3 days a week.  It can reduce your risk of falling.  Even if you have a hard time meeting the recommendations, it's better to be more active

## 2025-03-10 NOTE — ASSESSMENT & PLAN NOTE
Unclear control, with weight loss he did liberalize his diet. Did review he can increase carbohydrates to keep weight in the 150's. Will repeat labs and reassess.

## 2025-03-24 ENCOUNTER — RESULTS FOLLOW-UP (OUTPATIENT)
Age: 80
End: 2025-03-24

## 2025-03-24 DIAGNOSIS — I10 HYPERTENSION, ESSENTIAL: ICD-10-CM

## 2025-03-24 DIAGNOSIS — E78.00 PURE HYPERCHOLESTEROLEMIA: ICD-10-CM

## 2025-03-24 DIAGNOSIS — R73.03 PRE-DIABETES: ICD-10-CM

## 2025-03-24 LAB
ALBUMIN SERPL-MCNC: 3.9 G/DL (ref 3.5–5)
ALBUMIN/GLOB SERPL: 1.3 (ref 1.1–2.2)
ALP SERPL-CCNC: 76 U/L (ref 45–117)
ALT SERPL-CCNC: 16 U/L (ref 12–78)
ANION GAP SERPL CALC-SCNC: 9 MMOL/L (ref 2–12)
AST SERPL-CCNC: 13 U/L (ref 15–37)
BILIRUB SERPL-MCNC: 0.9 MG/DL (ref 0.2–1)
BUN SERPL-MCNC: 24 MG/DL (ref 6–20)
BUN/CREAT SERPL: 27 (ref 12–20)
CALCIUM SERPL-MCNC: 9.4 MG/DL (ref 8.5–10.1)
CHLORIDE SERPL-SCNC: 103 MMOL/L (ref 97–108)
CHOLEST SERPL-MCNC: 188 MG/DL
CO2 SERPL-SCNC: 30 MMOL/L (ref 21–32)
CREAT SERPL-MCNC: 0.9 MG/DL (ref 0.7–1.3)
ERYTHROCYTE [DISTWIDTH] IN BLOOD BY AUTOMATED COUNT: 13.4 % (ref 11.5–14.5)
EST. AVERAGE GLUCOSE BLD GHB EST-MCNC: 120 MG/DL
GLOBULIN SER CALC-MCNC: 2.9 G/DL (ref 2–4)
GLUCOSE SERPL-MCNC: 111 MG/DL (ref 65–100)
HBA1C MFR BLD: 5.8 % (ref 4–5.6)
HCT VFR BLD AUTO: 45.2 % (ref 36.6–50.3)
HDLC SERPL-MCNC: 83 MG/DL
HDLC SERPL: 2.3 (ref 0–5)
HGB BLD-MCNC: 14.9 G/DL (ref 12.1–17)
LDLC SERPL CALC-MCNC: 83.4 MG/DL (ref 0–100)
MCH RBC QN AUTO: 32 PG (ref 26–34)
MCHC RBC AUTO-ENTMCNC: 33 G/DL (ref 30–36.5)
MCV RBC AUTO: 97 FL (ref 80–99)
NRBC # BLD: 0 K/UL (ref 0–0.01)
NRBC BLD-RTO: 0 PER 100 WBC
PLATELET # BLD AUTO: 248 K/UL (ref 150–400)
PMV BLD AUTO: 10 FL (ref 8.9–12.9)
POTASSIUM SERPL-SCNC: 3.3 MMOL/L (ref 3.5–5.1)
PROT SERPL-MCNC: 6.8 G/DL (ref 6.4–8.2)
RBC # BLD AUTO: 4.66 M/UL (ref 4.1–5.7)
SODIUM SERPL-SCNC: 142 MMOL/L (ref 136–145)
TRIGL SERPL-MCNC: 108 MG/DL
VLDLC SERPL CALC-MCNC: 21.6 MG/DL
WBC # BLD AUTO: 8.5 K/UL (ref 4.1–11.1)

## 2025-03-24 RX ORDER — POTASSIUM CHLORIDE 750 MG/1
10 TABLET, EXTENDED RELEASE ORAL DAILY
Qty: 90 TABLET | Refills: 0 | Status: SHIPPED | OUTPATIENT
Start: 2025-03-24

## 2025-03-25 NOTE — RESULT ENCOUNTER NOTE
Results released to patient via [employer].  All labs are stable or at goal for him, except for hypokalemia (new, 2nd time checked since starting HCTZ, was normal previously, will start KCl supplement).

## 2025-05-12 ENCOUNTER — TELEPHONE (OUTPATIENT)
Age: 80
End: 2025-05-12

## 2025-05-12 DIAGNOSIS — M15.9 OSTEOARTHRITIS OF MULTIPLE JOINTS, UNSPECIFIED OSTEOARTHRITIS TYPE: ICD-10-CM

## 2025-05-12 RX ORDER — CELECOXIB 200 MG/1
200 CAPSULE ORAL DAILY
Qty: 90 CAPSULE | Refills: 0 | Status: SHIPPED | OUTPATIENT
Start: 2025-05-12

## 2025-05-12 NOTE — TELEPHONE ENCOUNTER
Medication Refill Request    Andrew De La Vega is requesting a refill of the following medication(s):     celecoxib (CELEBREX) 200 MG capsule     Please send refill to:     [facility] [id number] - [place], [place] - [address] - P [phone] - F [phone]

## 2025-05-17 DIAGNOSIS — E78.00 PURE HYPERCHOLESTEROLEMIA, UNSPECIFIED: ICD-10-CM

## 2025-05-18 RX ORDER — ATORVASTATIN CALCIUM 10 MG/1
10 TABLET, FILM COATED ORAL DAILY
Qty: 90 TABLET | Refills: 3 | Status: SHIPPED | OUTPATIENT
Start: 2025-05-18

## 2025-08-06 ENCOUNTER — TELEPHONE (OUTPATIENT)
Age: 80
End: 2025-08-06

## 2025-08-06 DIAGNOSIS — M15.9 OSTEOARTHRITIS OF MULTIPLE JOINTS, UNSPECIFIED OSTEOARTHRITIS TYPE: ICD-10-CM

## 2025-08-06 RX ORDER — CELECOXIB 200 MG/1
200 CAPSULE ORAL DAILY
Qty: 90 CAPSULE | Refills: 0 | Status: SHIPPED | OUTPATIENT
Start: 2025-08-06

## 2025-08-14 DIAGNOSIS — I10 HYPERTENSION, ESSENTIAL: ICD-10-CM

## 2025-08-14 RX ORDER — HYDROCHLOROTHIAZIDE 12.5 MG/1
12.5 TABLET ORAL DAILY
Qty: 90 TABLET | Refills: 3 | Status: SHIPPED | OUTPATIENT
Start: 2025-08-14

## (undated) DEVICE — CARTRIDGE BNE CEM MIX UNIV TWR VAC ROTOR BRK OFF NOZ W/O

## (undated) DEVICE — PENCIL SMK EVAC L10FT DIA95MM TBNG NONSTICK W ADPT TO 22MM

## (undated) DEVICE — GLOVE SURG SZ 85 L12IN FNGR THK79MIL GRN LTX FREE

## (undated) DEVICE — TUBING HYDR IRR --

## (undated) DEVICE — ZIPPERED TOGA, 2X LARGE: Brand: FLYTE, SURGICOOL

## (undated) DEVICE — MARKER,SKIN,WI/RULER AND LABELS: Brand: MEDLINE

## (undated) DEVICE — REM POLYHESIVE ADULT PATIENT RETURN ELECTRODE: Brand: VALLEYLAB

## (undated) DEVICE — PAD,ABDOMINAL,5"X9",ST,LF,25/BX: Brand: MEDLINE INDUSTRIES, INC.

## (undated) DEVICE — NEEDLE SCLERO 23GA L4MM CATH L240CM CNTRST SHTH DIA1.8MM

## (undated) DEVICE — GOWN,NON-REINFORCED,XXL: Brand: MEDLINE

## (undated) DEVICE — SOLUTION IV 50ML 0.9% SOD CHL

## (undated) DEVICE — TUBING SUCT 12FR MAL ALUM SHFT FN CAP VENT UNIV CONN W/ OBT

## (undated) DEVICE — SCRUB DRY SURG EZ SCRUB BRUSH PREOPERATIVE GRN

## (undated) DEVICE — SUTURE STRATAFIX SPRL SZ 1 L14IN ABSRB VLT L48CM CTX 1/2 SXPD2B405

## (undated) DEVICE — TOTAL JOINT - SMH: Brand: MEDLINE INDUSTRIES, INC.

## (undated) DEVICE — DERMABOND SKIN ADH 0.7ML -- DERMABOND ADVANCED 12/BX

## (undated) DEVICE — SUTURE STRATAFIX SYMMETRIC PDS + SZ 1 L18IN ABSRB VLT L48MM SXPP1A400

## (undated) DEVICE — Z DISCONTINUED USE 2744636  DRESSING AQUACEL 14 IN ALG W3.5XL14IN POLYUR FLM CVR W/ HYDRCOLL

## (undated) DEVICE — 3M™ IOBAN™ 2 ANTIMICROBIAL INCISE DRAPE 6650EZ: Brand: IOBAN™ 2

## (undated) DEVICE — PACK SURG PROC KNEE USER GPS

## (undated) DEVICE — Device: Brand: DISPOSABLE ELECTROSURGICAL SNARE

## (undated) DEVICE — SYSTEM NAVIGATION PALM SZ PRECIS ALIGN TECHNOLOGY DISP FOR

## (undated) DEVICE — INSTRUMENT KIT SHLDR HEX PIN GPS

## (undated) DEVICE — DRAPE,U/ SHT,SPLIT,PLAS,STERIL: Brand: MEDLINE

## (undated) DEVICE — SUTURE VCRL 1 L27IN ABSRB CT BRAID COAT UD J281H

## (undated) DEVICE — HYPODERMIC SAFETY NEEDLE: Brand: MAGELLAN

## (undated) DEVICE — BANDAGE COMPR M W6INXL10YD WHT BGE VELC E MTRX HK AND LOOP

## (undated) DEVICE — SPONGE GZ W4XL4IN COT 12 PLY TYP VII WVN C FLD DSGN

## (undated) DEVICE — SYR 20ML LL STRL LF --

## (undated) DEVICE — SOLUTION IRRIG 1000ML H2O STRL BLT

## (undated) DEVICE — YANKAUER,FLEXIBLE HANDLE,REGLR CAPACITY: Brand: MEDLINE INDUSTRIES, INC.

## (undated) DEVICE — SUTURE FIBERWIRE SZ 2 W/ TAPERED NEEDLE BLUE L38IN NONABSORB BLU L26.5MM 1/2 CIRCLE AR7200

## (undated) DEVICE — GLOVE SURG SZ 65 L12IN FNGR THK94MIL STD WHT LTX FREE

## (undated) DEVICE — SUTURE ETHBND EXCEL SZ 1 L30IN NONABSORBABLE GRN L36MM CT-1 X425H

## (undated) DEVICE — 3M™ IOBAN™ 2 ANTIMICROBIAL INCISE DRAPE 6651EZ: Brand: IOBAN™ 2

## (undated) DEVICE — TIP SUCT CRV REG REDI

## (undated) DEVICE — SUTURE MCRYL SZ 3-0 L27IN ABSRB UD L24MM PS-1 3/8 CIR PRIM Y936H

## (undated) DEVICE — TOTAL TRAY, 16FR 10ML SIL FOLEY, URN: Brand: MEDLINE

## (undated) DEVICE — PADDING CAST SPEC 6INX4YD COT --

## (undated) DEVICE — STRIP,CLOSURE,WOUND,MEDI-STRIP,1/2X4: Brand: MEDLINE

## (undated) DEVICE — DRAPE,EXTREMITY,89X128,STERILE: Brand: MEDLINE

## (undated) DEVICE — 2108 SERIES SAGITTAL BLADE AGGRESSIVE  (25.0 X 1.19 X 85.0MM)

## (undated) DEVICE — SOLUTION IRRIG 3000ML 0.9% SOD CHL USP UROMATIC PLAS CONT

## (undated) DEVICE — GOWN,SIRUS,NONRNF,SETINSLV,2XL,18/CS: Brand: MEDLINE

## (undated) DEVICE — SOLUTION SURG PREP 26 CC PURPREP

## (undated) DEVICE — SPONGE GZ W4XL4IN COT RADPQ HIGHLY ABSRB

## (undated) DEVICE — ZIPPERED TOGA, X-LARGE: Brand: FLYTE, SURGICOOL

## (undated) DEVICE — GRADUATED BOWL: Brand: DEVON

## (undated) DEVICE — DRESSING HYDROCOLLOID BORDER 35X10 IN ALUM PRIMASEAL

## (undated) DEVICE — HANDLE LT SNAP ON ULT DURABLE LENS FOR TRUMPF ALC DISPOSABLE

## (undated) DEVICE — STERILE POLYISOPRENE POWDER-FREE SURGICAL GLOVES WITH EMOLLIENT COATING: Brand: PROTEXIS

## (undated) DEVICE — Device

## (undated) DEVICE — INFECTION CONTROL KIT SYS

## (undated) DEVICE — PREP SKN CHLRAPRP APL 26ML STR --

## (undated) DEVICE — ZIMMER® STERILE DISPOSABLE TOURNIQUET CUFF WITH PLC, DUAL PORT, SINGLE BLADDER, 34 IN. (86 CM)

## (undated) DEVICE — GLOVE SURG SZ 85 L12IN FNGR THK94MIL STD WHT LTX FREE

## (undated) DEVICE — GARMENT,MEDLINE,DVT,INT,CALF,MED, GEN2: Brand: MEDLINE

## (undated) DEVICE — GEL SUBMUCOSAL LIFT AGNT -- ORISE

## (undated) DEVICE — IMMOBILIZER SHLDR M UNIV 65X17IN BLK LIGHWEIGHT PCH SZ REUSE

## (undated) DEVICE — SUTURE VCRL SZ 2-0 L36IN ABSRB UD L40MM CT 1/2 CIR J957H

## (undated) DEVICE — GOWN,PREVENTION PLUS,XLN/2XL,ST,22/CS: Brand: MEDLINE

## (undated) DEVICE — COVER,MAYO STAND,STERILE: Brand: MEDLINE

## (undated) DEVICE — 3M™ STERI-DRAPE™ U-DRAPE 1015: Brand: STERI-DRAPE™

## (undated) DEVICE — GLOVE SURG SZ 65 L12IN FNGR THK79MIL GRN LTX FREE

## (undated) DEVICE — SOLUTION IRRIG 1000ML STRL H2O USP PLAS POUR BTL

## (undated) DEVICE — SOLUTION IRRIG 3000ML 0.9% SOD CHL FLX CONT 0797208] ICU MEDICAL INC]

## (undated) DEVICE — T5 HOOD WITH PEEL AWAY FACE SHIELD

## (undated) DEVICE — SYR 10ML LUER LOK 1/5ML GRAD --

## (undated) DEVICE — PREP KIT PEEL PTCH POVIDONE IOD

## (undated) DEVICE — HANDPIECE SET WITH BONE CLEANING TIP AND SUCTION TUBE: Brand: INTERPULSE

## (undated) DEVICE — STRAP,POSITIONING,KNEE/BODY,FOAM,4X60": Brand: MEDLINE

## (undated) DEVICE — 4-PORT MANIFOLD: Brand: NEPTUNE 2

## (undated) DEVICE — CONTAINER,SPECIMEN,4OZ,OR STRL: Brand: MEDLINE

## (undated) DEVICE — SUTURE MCRYL SZ 4-0 L27IN ABSRB UD L24MM PS-1 3/8 CIR PRIM Y935H

## (undated) DEVICE — SUTURE VCRL SZ 1 L36IN ABSRB UD L36MM CT-1 1/2 CIR J947H

## (undated) DEVICE — STRYKER PERFORMANCE SERIES SAGITTAL BLADE: Brand: STRYKER PERFORMANCE SERIES

## (undated) DEVICE — TRAP SURG QUAD PARABOLA SLOT DSGN SFTY SCRN TRAPEASE

## (undated) DEVICE — CUSTOM CAST PD STR